# Patient Record
Sex: FEMALE | Race: WHITE | Employment: OTHER | URBAN - METROPOLITAN AREA
[De-identification: names, ages, dates, MRNs, and addresses within clinical notes are randomized per-mention and may not be internally consistent; named-entity substitution may affect disease eponyms.]

---

## 2017-01-01 ENCOUNTER — PATIENT OUTREACH (OUTPATIENT)
Dept: FAMILY MEDICINE CLINIC | Age: 82
End: 2017-01-01

## 2017-01-01 ENCOUNTER — HOME CARE VISIT (OUTPATIENT)
Dept: SCHEDULING | Facility: HOME HEALTH | Age: 82
End: 2017-01-01
Payer: MEDICARE

## 2017-01-01 ENCOUNTER — HOME CARE VISIT (OUTPATIENT)
Dept: HOME HEALTH SERVICES | Facility: HOME HEALTH | Age: 82
End: 2017-01-01

## 2017-01-01 ENCOUNTER — OFFICE VISIT (OUTPATIENT)
Dept: FAMILY MEDICINE CLINIC | Age: 82
End: 2017-01-01

## 2017-01-01 ENCOUNTER — HOME CARE VISIT (OUTPATIENT)
Dept: HOME HEALTH SERVICES | Facility: HOME HEALTH | Age: 82
End: 2017-01-01
Payer: MEDICARE

## 2017-01-01 ENCOUNTER — HOME CARE VISIT (OUTPATIENT)
Dept: SCHEDULING | Facility: HOME HEALTH | Age: 82
End: 2017-01-01

## 2017-01-01 ENCOUNTER — HOSPITAL ENCOUNTER (OUTPATIENT)
Dept: LAB | Age: 82
Discharge: HOME | End: 2017-11-01
Payer: MEDICARE

## 2017-01-01 ENCOUNTER — HOSPITAL ENCOUNTER (OUTPATIENT)
Dept: LAB | Age: 82
Discharge: HOME | End: 2017-05-31
Payer: MEDICARE

## 2017-01-01 ENCOUNTER — HOME HEALTH ADMISSION (OUTPATIENT)
Dept: HOME HEALTH SERVICES | Facility: HOME HEALTH | Age: 82
End: 2017-01-01
Payer: MEDICARE

## 2017-01-01 ENCOUNTER — HOSPITAL ENCOUNTER (OUTPATIENT)
Dept: LAB | Age: 82
Discharge: HOME | End: 2017-07-31
Payer: MEDICARE

## 2017-01-01 VITALS
OXYGEN SATURATION: 98 % | HEART RATE: 60 BPM | RESPIRATION RATE: 14 BRPM | TEMPERATURE: 98.1 F | DIASTOLIC BLOOD PRESSURE: 53 MMHG | SYSTOLIC BLOOD PRESSURE: 113 MMHG

## 2017-01-01 VITALS
TEMPERATURE: 98.5 F | OXYGEN SATURATION: 98 % | HEART RATE: 62 BPM | SYSTOLIC BLOOD PRESSURE: 131 MMHG | DIASTOLIC BLOOD PRESSURE: 53 MMHG | RESPIRATION RATE: 15 BRPM

## 2017-01-01 VITALS
BODY MASS INDEX: 18.16 KG/M2 | RESPIRATION RATE: 20 BRPM | HEART RATE: 54 BPM | OXYGEN SATURATION: 96 % | HEIGHT: 65 IN | TEMPERATURE: 95.6 F | WEIGHT: 109 LBS | SYSTOLIC BLOOD PRESSURE: 135 MMHG | DIASTOLIC BLOOD PRESSURE: 47 MMHG

## 2017-01-01 VITALS
SYSTOLIC BLOOD PRESSURE: 120 MMHG | HEART RATE: 67 BPM | RESPIRATION RATE: 14 BRPM | OXYGEN SATURATION: 97 % | DIASTOLIC BLOOD PRESSURE: 67 MMHG | TEMPERATURE: 98.3 F

## 2017-01-01 VITALS
HEIGHT: 65 IN | HEART RATE: 102 BPM | SYSTOLIC BLOOD PRESSURE: 126 MMHG | DIASTOLIC BLOOD PRESSURE: 80 MMHG | BODY MASS INDEX: 18.66 KG/M2 | TEMPERATURE: 96.7 F | OXYGEN SATURATION: 96 % | RESPIRATION RATE: 20 BRPM | WEIGHT: 112 LBS

## 2017-01-01 VITALS
HEIGHT: 65 IN | SYSTOLIC BLOOD PRESSURE: 150 MMHG | WEIGHT: 105 LBS | BODY MASS INDEX: 17.49 KG/M2 | HEART RATE: 53 BPM | OXYGEN SATURATION: 98 % | TEMPERATURE: 97.4 F | RESPIRATION RATE: 20 BRPM | DIASTOLIC BLOOD PRESSURE: 57 MMHG

## 2017-01-01 VITALS
SYSTOLIC BLOOD PRESSURE: 125 MMHG | DIASTOLIC BLOOD PRESSURE: 60 MMHG | HEART RATE: 57 BPM | OXYGEN SATURATION: 97 % | RESPIRATION RATE: 14 BRPM | TEMPERATURE: 98.3 F

## 2017-01-01 VITALS
TEMPERATURE: 98.1 F | RESPIRATION RATE: 15 BRPM | OXYGEN SATURATION: 98 % | SYSTOLIC BLOOD PRESSURE: 111 MMHG | DIASTOLIC BLOOD PRESSURE: 50 MMHG | HEART RATE: 57 BPM

## 2017-01-01 VITALS
HEART RATE: 69 BPM | SYSTOLIC BLOOD PRESSURE: 129 MMHG | BODY MASS INDEX: 18.69 KG/M2 | RESPIRATION RATE: 16 BRPM | DIASTOLIC BLOOD PRESSURE: 46 MMHG | OXYGEN SATURATION: 97 % | HEIGHT: 65 IN | TEMPERATURE: 98.1 F | WEIGHT: 112.2 LBS

## 2017-01-01 VITALS
DIASTOLIC BLOOD PRESSURE: 50 MMHG | HEART RATE: 56 BPM | RESPIRATION RATE: 15 BRPM | SYSTOLIC BLOOD PRESSURE: 117 MMHG | OXYGEN SATURATION: 96 % | TEMPERATURE: 99.2 F

## 2017-01-01 VITALS
TEMPERATURE: 98.2 F | OXYGEN SATURATION: 95 % | SYSTOLIC BLOOD PRESSURE: 151 MMHG | BODY MASS INDEX: 18.16 KG/M2 | RESPIRATION RATE: 20 BRPM | DIASTOLIC BLOOD PRESSURE: 53 MMHG | WEIGHT: 109 LBS | HEIGHT: 65 IN | HEART RATE: 62 BPM

## 2017-01-01 VITALS
TEMPERATURE: 98.2 F | SYSTOLIC BLOOD PRESSURE: 132 MMHG | HEART RATE: 62 BPM | DIASTOLIC BLOOD PRESSURE: 56 MMHG | OXYGEN SATURATION: 97 % | RESPIRATION RATE: 15 BRPM

## 2017-01-01 DIAGNOSIS — F41.9 ANXIETY: ICD-10-CM

## 2017-01-01 DIAGNOSIS — G30.9 ALZHEIMER'S DEMENTIA WITH BEHAVIORAL DISTURBANCE, UNSPECIFIED TIMING OF DEMENTIA ONSET: ICD-10-CM

## 2017-01-01 DIAGNOSIS — Z00.00 ROUTINE GENERAL MEDICAL EXAMINATION AT A HEALTH CARE FACILITY: ICD-10-CM

## 2017-01-01 DIAGNOSIS — F32.A DEPRESSION, UNSPECIFIED DEPRESSION TYPE: ICD-10-CM

## 2017-01-01 DIAGNOSIS — R35.0 URINARY FREQUENCY: Primary | ICD-10-CM

## 2017-01-01 DIAGNOSIS — Z13.39 SCREENING FOR ALCOHOLISM: ICD-10-CM

## 2017-01-01 DIAGNOSIS — G30.9 ALZHEIMER'S DEMENTIA WITH BEHAVIORAL DISTURBANCE, UNSPECIFIED TIMING OF DEMENTIA ONSET: Primary | ICD-10-CM

## 2017-01-01 DIAGNOSIS — G30.9 ALZHEIMER'S DEMENTIA WITHOUT BEHAVIORAL DISTURBANCE, UNSPECIFIED TIMING OF DEMENTIA ONSET: ICD-10-CM

## 2017-01-01 DIAGNOSIS — F02.81 ALZHEIMER'S DEMENTIA WITH BEHAVIORAL DISTURBANCE, UNSPECIFIED TIMING OF DEMENTIA ONSET: ICD-10-CM

## 2017-01-01 DIAGNOSIS — M50.30 DDD (DEGENERATIVE DISC DISEASE), CERVICAL: Primary | ICD-10-CM

## 2017-01-01 DIAGNOSIS — R20.8 BURNING SENSATION IN LOWER EXTREMITY: Primary | ICD-10-CM

## 2017-01-01 DIAGNOSIS — Z13.6 SCREENING FOR ISCHEMIC HEART DISEASE: ICD-10-CM

## 2017-01-01 DIAGNOSIS — I10 ESSENTIAL HYPERTENSION WITH GOAL BLOOD PRESSURE LESS THAN 140/90: Primary | ICD-10-CM

## 2017-01-01 DIAGNOSIS — I10 ESSENTIAL HYPERTENSION WITH GOAL BLOOD PRESSURE LESS THAN 140/90: ICD-10-CM

## 2017-01-01 DIAGNOSIS — R82.81 PYURIA: ICD-10-CM

## 2017-01-01 DIAGNOSIS — Z23 ENCOUNTER FOR IMMUNIZATION: ICD-10-CM

## 2017-01-01 DIAGNOSIS — R20.8 BURNING SENSATION IN LOWER EXTREMITY: ICD-10-CM

## 2017-01-01 DIAGNOSIS — F02.80 ALZHEIMER'S DEMENTIA WITHOUT BEHAVIORAL DISTURBANCE, UNSPECIFIED TIMING OF DEMENTIA ONSET: ICD-10-CM

## 2017-01-01 DIAGNOSIS — F02.81 ALZHEIMER'S DEMENTIA WITH BEHAVIORAL DISTURBANCE, UNSPECIFIED TIMING OF DEMENTIA ONSET: Primary | ICD-10-CM

## 2017-01-01 LAB
ALBUMIN SERPL-MCNC: 4 G/DL (ref 3.5–4.7)
ALBUMIN SERPL-MCNC: 4.1 G/DL (ref 3.5–4.7)
ALBUMIN/GLOB SERPL: 1.3 {RATIO} (ref 1.2–2.2)
ALBUMIN/GLOB SERPL: 1.6 {RATIO} (ref 1.2–2.2)
ALP SERPL-CCNC: 102 IU/L (ref 39–117)
ALP SERPL-CCNC: 90 IU/L (ref 39–117)
ALT SERPL-CCNC: 14 IU/L (ref 0–32)
ALT SERPL-CCNC: 16 IU/L (ref 0–32)
AST SERPL-CCNC: 20 IU/L (ref 0–40)
AST SERPL-CCNC: 22 IU/L (ref 0–40)
BACTERIA UR CULT: ABNORMAL
BASOPHILS # BLD AUTO: 0.1 X10E3/UL (ref 0–0.2)
BASOPHILS NFR BLD AUTO: 4 %
BILIRUB SERPL-MCNC: 0.2 MG/DL (ref 0–1.2)
BILIRUB SERPL-MCNC: 0.3 MG/DL (ref 0–1.2)
BILIRUB UR QL STRIP: NEGATIVE
BUN SERPL-MCNC: 15 MG/DL (ref 8–27)
BUN SERPL-MCNC: 19 MG/DL (ref 8–27)
BUN/CREAT SERPL: 16 (ref 12–28)
BUN/CREAT SERPL: 18 (ref 12–28)
CALCIUM SERPL-MCNC: 9 MG/DL (ref 8.7–10.3)
CALCIUM SERPL-MCNC: 9.2 MG/DL (ref 8.7–10.3)
CHLORIDE SERPL-SCNC: 100 MMOL/L (ref 96–106)
CHLORIDE SERPL-SCNC: 98 MMOL/L (ref 96–106)
CHOLEST SERPL-MCNC: 199 MG/DL (ref 100–199)
CO2 SERPL-SCNC: 28 MMOL/L (ref 18–29)
CO2 SERPL-SCNC: 30 MMOL/L (ref 18–29)
CREAT SERPL-MCNC: 0.94 MG/DL (ref 0.57–1)
CREAT SERPL-MCNC: 1.04 MG/DL (ref 0.57–1)
EOSINOPHIL # BLD AUTO: 0.1 X10E3/UL (ref 0–0.4)
EOSINOPHIL NFR BLD AUTO: 3 %
ERYTHROCYTE [DISTWIDTH] IN BLOOD BY AUTOMATED COUNT: 14.6 % (ref 12.3–15.4)
FOLATE SERPL-MCNC: 8.9 NG/ML
GFR SERPLBLD CREATININE-BSD FMLA CKD-EPI: 56 ML/MIN/1.73
GFR SERPLBLD CREATININE-BSD FMLA CKD-EPI: 65 ML/MIN/1.73
GLOBULIN SER CALC-MCNC: 2.6 G/DL (ref 1.5–4.5)
GLOBULIN SER CALC-MCNC: 3 G/DL (ref 1.5–4.5)
GLUCOSE SERPL-MCNC: 75 MG/DL (ref 65–99)
GLUCOSE SERPL-MCNC: 92 MG/DL (ref 65–99)
GLUCOSE UR-MCNC: NEGATIVE MG/DL
HBA1C MFR BLD HPLC: 5.3 %
HCT VFR BLD AUTO: 24.9 % (ref 34–46.6)
HDLC SERPL-MCNC: 42 MG/DL
HGB BLD-MCNC: 8.9 G/DL (ref 11.1–15.9)
IMM GRANULOCYTES # BLD: 0 X10E3/UL (ref 0–0.1)
IMM GRANULOCYTES NFR BLD: 0 %
INTERPRETATION, 910389: NORMAL
INTERPRETATION: NORMAL
INTERPRETATION: NORMAL
IRON SATN MFR SERPL: 30 % (ref 15–55)
IRON SERPL-MCNC: 75 UG/DL (ref 27–139)
KETONES P FAST UR STRIP-MCNC: NEGATIVE MG/DL
LDLC SERPL CALC-MCNC: 96 MG/DL (ref 0–99)
LYMPHOCYTES # BLD AUTO: 1.5 X10E3/UL (ref 0.7–3.1)
LYMPHOCYTES NFR BLD AUTO: 71 %
MCH RBC QN AUTO: 36 PG (ref 26.6–33)
MCHC RBC AUTO-ENTMCNC: 35.7 G/DL (ref 31.5–35.7)
MCV RBC AUTO: 101 FL (ref 79–97)
MONOCYTES # BLD AUTO: 0.2 X10E3/UL (ref 0.1–0.9)
MONOCYTES NFR BLD AUTO: 7 %
MORPHOLOGY BLD-IMP: ABNORMAL
NEUTROPHILS # BLD AUTO: 0.3 X10E3/UL (ref 1.4–7)
NEUTROPHILS NFR BLD AUTO: 15 %
PDF IMAGE, 910387: NORMAL
PH UR STRIP: 7 [PH] (ref 4.6–8)
PLATELET # BLD AUTO: 401 X10E3/UL (ref 150–379)
POTASSIUM SERPL-SCNC: 3.8 MMOL/L (ref 3.5–5.2)
POTASSIUM SERPL-SCNC: 4.3 MMOL/L (ref 3.5–5.2)
PROT SERPL-MCNC: 6.7 G/DL (ref 6–8.5)
PROT SERPL-MCNC: 7 G/DL (ref 6–8.5)
PROT UR QL STRIP: NORMAL MG/DL
RBC # BLD AUTO: 2.47 X10E6/UL (ref 3.77–5.28)
SODIUM SERPL-SCNC: 140 MMOL/L (ref 134–144)
SODIUM SERPL-SCNC: 142 MMOL/L (ref 134–144)
SP GR UR STRIP: 1.01 (ref 1–1.03)
TIBC SERPL-MCNC: 254 UG/DL (ref 250–450)
TRIGL SERPL-MCNC: 303 MG/DL (ref 0–149)
TSH SERPL DL<=0.005 MIU/L-ACNC: 1.91 UIU/ML (ref 0.45–4.5)
UA UROBILINOGEN AMB POC: NORMAL (ref 0.2–1)
UIBC SERPL-MCNC: 179 UG/DL (ref 118–369)
URINALYSIS CLARITY POC: NORMAL
URINALYSIS COLOR POC: YELLOW
URINE BLOOD POC: NEGATIVE
URINE LEUKOCYTES POC: NORMAL
URINE NITRITES POC: NEGATIVE
VIT B12 SERPL-MCNC: 309 PG/ML (ref 211–946)
VLDLC SERPL CALC-MCNC: 61 MG/DL (ref 5–40)
WBC # BLD AUTO: 2.2 X10E3/UL (ref 3.4–10.8)

## 2017-01-01 PROCEDURE — 3331090001 HH PPS REVENUE CREDIT

## 2017-01-01 PROCEDURE — G0155 HHCP-SVS OF CSW,EA 15 MIN: HCPCS

## 2017-01-01 PROCEDURE — 80053 COMPREHEN METABOLIC PANEL: CPT

## 2017-01-01 PROCEDURE — 84443 ASSAY THYROID STIM HORMONE: CPT

## 2017-01-01 PROCEDURE — G0151 HHCP-SERV OF PT,EA 15 MIN: HCPCS

## 2017-01-01 PROCEDURE — 3331090002 HH PPS REVENUE DEBIT

## 2017-01-01 PROCEDURE — G0299 HHS/HOSPICE OF RN EA 15 MIN: HCPCS

## 2017-01-01 PROCEDURE — 80061 LIPID PANEL: CPT

## 2017-01-01 PROCEDURE — 3331090003 HH PPS REVENUE ADJ

## 2017-01-01 PROCEDURE — 83550 IRON BINDING TEST: CPT

## 2017-01-01 PROCEDURE — 85025 COMPLETE CBC W/AUTO DIFF WBC: CPT

## 2017-01-01 PROCEDURE — 400013 HH SOC

## 2017-01-01 PROCEDURE — 87186 SC STD MICRODIL/AGAR DIL: CPT

## 2017-01-01 PROCEDURE — 87088 URINE BACTERIA CULTURE: CPT

## 2017-01-01 PROCEDURE — 36415 COLL VENOUS BLD VENIPUNCTURE: CPT

## 2017-01-01 PROCEDURE — 87086 URINE CULTURE/COLONY COUNT: CPT

## 2017-01-01 PROCEDURE — 82607 VITAMIN B-12: CPT

## 2017-01-01 RX ORDER — METOPROLOL SUCCINATE 25 MG/1
TABLET, EXTENDED RELEASE ORAL
Qty: 30 TAB | Refills: 2 | Status: SHIPPED | OUTPATIENT
Start: 2017-01-01 | End: 2017-01-01 | Stop reason: SDUPTHER

## 2017-01-01 RX ORDER — SERTRALINE HYDROCHLORIDE 50 MG/1
TABLET, FILM COATED ORAL
Qty: 60 TAB | Refills: 11 | Status: SHIPPED | OUTPATIENT
Start: 2017-01-01 | End: 2018-01-01 | Stop reason: DRUGHIGH

## 2017-01-01 RX ORDER — ALPRAZOLAM 0.25 MG/1
TABLET ORAL
Qty: 60 TAB | Refills: 3 | Status: SHIPPED | OUTPATIENT
Start: 2017-01-01 | End: 2018-01-01 | Stop reason: DRUGHIGH

## 2017-01-01 RX ORDER — ALPRAZOLAM 0.25 MG/1
TABLET ORAL
Qty: 60 TAB | Refills: 3 | Status: SHIPPED | OUTPATIENT
Start: 2017-01-01 | End: 2017-01-01 | Stop reason: SDUPTHER

## 2017-01-01 RX ORDER — ALPRAZOLAM 0.25 MG/1
TABLET ORAL
Qty: 60 TAB | Refills: 2 | Status: SHIPPED | OUTPATIENT
Start: 2017-01-01 | End: 2017-01-01 | Stop reason: SDUPTHER

## 2017-01-01 RX ORDER — GABAPENTIN 100 MG/1
100 CAPSULE ORAL
Qty: 60 CAP | Refills: 1 | Status: SHIPPED | OUTPATIENT
Start: 2017-01-01 | End: 2017-01-01 | Stop reason: SDUPTHER

## 2017-01-01 RX ORDER — SULFAMETHOXAZOLE AND TRIMETHOPRIM 800; 160 MG/1; MG/1
1 TABLET ORAL 2 TIMES DAILY
Qty: 6 TAB | Refills: 0 | Status: SHIPPED | OUTPATIENT
Start: 2017-01-01 | End: 2017-01-01

## 2017-01-01 RX ORDER — GABAPENTIN 300 MG/1
300 CAPSULE ORAL 2 TIMES DAILY
Qty: 60 CAP | Refills: 3 | Status: SHIPPED | OUTPATIENT
Start: 2017-01-01 | End: 2018-01-01

## 2017-01-01 RX ORDER — LOSARTAN POTASSIUM AND HYDROCHLOROTHIAZIDE 25; 100 MG/1; MG/1
TABLET ORAL
Qty: 30 TAB | Refills: 11 | Status: SHIPPED | OUTPATIENT
Start: 2017-01-01 | End: 2018-01-01

## 2017-01-01 RX ORDER — METOPROLOL SUCCINATE 25 MG/1
25 TABLET, EXTENDED RELEASE ORAL DAILY
Qty: 30 TAB | Refills: 11 | Status: ON HOLD | OUTPATIENT
Start: 2017-01-01 | End: 2018-01-01

## 2017-01-01 RX ORDER — DONEPEZIL HYDROCHLORIDE 5 MG/1
5 TABLET, FILM COATED ORAL
Qty: 30 TAB | Refills: 1 | Status: SHIPPED | OUTPATIENT
Start: 2017-01-01 | End: 2017-01-01 | Stop reason: ALTCHOICE

## 2017-01-01 RX ORDER — ALPRAZOLAM 0.25 MG/1
TABLET ORAL
Qty: 30 TAB | Refills: 2 | Status: SHIPPED | OUTPATIENT
Start: 2017-01-01 | End: 2017-01-01 | Stop reason: SDUPTHER

## 2017-01-11 ENCOUNTER — OFFICE VISIT (OUTPATIENT)
Dept: FAMILY MEDICINE CLINIC | Age: 82
End: 2017-01-11

## 2017-01-11 VITALS
BODY MASS INDEX: 17.79 KG/M2 | WEIGHT: 106.8 LBS | HEART RATE: 92 BPM | SYSTOLIC BLOOD PRESSURE: 131 MMHG | TEMPERATURE: 97.3 F | OXYGEN SATURATION: 95 % | RESPIRATION RATE: 20 BRPM | DIASTOLIC BLOOD PRESSURE: 40 MMHG | HEIGHT: 65 IN

## 2017-01-11 DIAGNOSIS — I10 ESSENTIAL HYPERTENSION WITH GOAL BLOOD PRESSURE LESS THAN 140/90: ICD-10-CM

## 2017-01-11 DIAGNOSIS — F32.A DEPRESSION, UNSPECIFIED DEPRESSION TYPE: ICD-10-CM

## 2017-01-11 RX ORDER — SERTRALINE HYDROCHLORIDE 50 MG/1
TABLET, FILM COATED ORAL
Qty: 60 TAB | Refills: 3 | Status: SHIPPED | OUTPATIENT
Start: 2017-01-11 | End: 2017-01-01 | Stop reason: SDUPTHER

## 2017-01-11 RX ORDER — LOSARTAN POTASSIUM AND HYDROCHLOROTHIAZIDE 25; 100 MG/1; MG/1
TABLET ORAL
Qty: 30 TAB | Refills: 3 | Status: SHIPPED | OUTPATIENT
Start: 2017-01-11 | End: 2017-01-01 | Stop reason: SDUPTHER

## 2017-01-11 RX ORDER — METOPROLOL SUCCINATE 25 MG/1
25 TABLET, EXTENDED RELEASE ORAL
Qty: 30 TAB | Refills: 3 | Status: SHIPPED | OUTPATIENT
Start: 2017-01-11 | End: 2017-01-01 | Stop reason: SDUPTHER

## 2017-01-11 RX ORDER — ALPRAZOLAM 0.25 MG/1
TABLET ORAL
Qty: 30 TAB | Refills: 3 | Status: SHIPPED | OUTPATIENT
Start: 2017-01-11 | End: 2017-01-01 | Stop reason: SDUPTHER

## 2017-01-11 NOTE — MR AVS SNAPSHOT
Visit Information Date & Time Provider Department Dept. Phone Encounter #  
 1/11/2017  3:00 PM Ana Laura Colbert MD [facility] [phone] [id number] Follow-up Instructions Return in about 6 months (around 7/11/2017). Upcoming Health Maintenance Date Due DTaP/Tdap/Td series (1 - Tdap) 8/19/1955 GLAUCOMA SCREENING Q2Y 2/10/2016 MEDICARE YEARLY EXAM 4/30/2016 INFLUENZA AGE 9 TO ADULT 8/1/2016 Pneumococcal 65+ High/Highest Risk (2 of 2 - PPSV23) 9/15/2016 Allergies as of 1/11/2017  Review Complete On: 1/11/2017 By: Tra Guevara LPN No Known Allergies Current Immunizations  Reviewed on 10/18/2015 Name Date Influenza High Dose Vaccine PF 1/28/2015 Influenza Vaccine 10/3/2013 Influenza Vaccine (Quad) PF 10/18/2015  8:45 AM  
 Influenza Vaccine Split 8/31/2012, 9/15/2011 Pneumococcal Vaccine (Unspecified Type) 9/15/2011 Not reviewed this visit You Were Diagnosed With   
  
 Codes Comments Essential hypertension with goal blood pressure less than 140/90     ICD-10-CM: I10 
ICD-9-CM: 401.9 Depression, unspecified depression type     ICD-10-CM: F32.9 ICD-9-CM: 080 Vitals BP Pulse Temp Resp Height(growth percentile) Weight(growth percentile) 131/40 (BP 1 Location: Left arm, BP Patient Position: Sitting) 92 97.3 °F (36.3 °C) (Oral) 20 5' 5\" (1.651 m) 106 lb 12.8 oz (48.4 kg) LMP SpO2 BMI OB Status Smoking Status (LMP Unknown) 95% 17.77 kg/m2 Hysterectomy Former Smoker BMI and BSA Data Body Mass Index Body Surface Area  
 17.77 kg/m 2 1.49 m 2 Preferred Pharmacy Pharmacy Name Phone [email or web address] [phone] Your Updated Medication List  
  
   
This list is accurate as of: 1/11/17  3:31 PM.  Always use your most recent med list.  
  
  
  
  
 ALPRAZolam 0.25 mg tablet Commonly known as:  Emil Veliz TAKE ONE TABLET BY MOUTH TWICE DAILY AS NEEDED FOR ANXIETY. MAX 2 TABLETS DAILY. aspirin 81 mg chewable tablet Take 81 mg by mouth daily. losartan-hydroCHLOROthiazide 100-25 mg per tablet Commonly known as:  HYZAAR  
TAKE ONE TABLET BY MOUTH ONE TIME DAILY  
  
 metoprolol succinate 25 mg XL tablet Commonly known as:  TOPROL-XL Take 1 Tab by mouth nightly. sertraline 50 mg tablet Commonly known as:  ZOLOFT  
1 po bid TYLENOL PM PO Take  by mouth. Prescriptions Printed Refills  
 losartan-hydroCHLOROthiazide (HYZAAR) 100-25 mg per tablet 3 Sig: TAKE ONE TABLET BY MOUTH ONE TIME DAILY Class: Print  
 sertraline (ZOLOFT) 50 mg tablet 3 Si po bid Class: Print ALPRAZolam (XANAX) 0.25 mg tablet 3 Sig: TAKE ONE TABLET BY MOUTH TWICE DAILY AS NEEDED FOR ANXIETY. MAX 2 TABLETS DAILY. Class: Print  
 metoprolol succinate (TOPROL-XL) 25 mg XL tablet 3 Sig: Take 1 Tab by mouth nightly. Class: Print Route: Oral  
  
Follow-up Instructions Return in about 6 months (around 2017). Introducing \[facility] [place] [facility]\"" & [facility] SERVICES! Chuy Jimenez introduces [employer] patient portal. Now you can access parts of your medical record, email your doctor's office, and request medication refills online. 1. In your internet browser, go to [email or web address] 2. Click on the First Time User? Click Here link in the Sign In box. You will see the New Member Sign Up page. 3. Enter your Milk Access Code exactly as it appears below. You will not need to use this code after youve completed the sign-up process. If you do not sign up before the expiration date, you must request a new code. · Milk Access Code: [email or web address] Expires: 2017  3:14 PM 
 
4. Enter the last four digits of your Social Security Number (xxxx) and Date of Birth (mm/dd/yyyy) as indicated and click Submit. You will be taken to the next sign-up page. 5. Create a Socket Mobile ID. This will be your Socket Mobile login ID and cannot be changed, so think of one that is secure and easy to remember. 6. Create a Socket Mobile password. You can change your password at any time. 7. Enter your Password Reset Question and Answer. This can be used at a later time if you forget your password. 8. Enter your e-mail address. You will receive e-mail notification when new information is available in [address] 9. Click Sign Up. You can now view and download portions of your medical record. 10. Click the Download Summary menu link to download a portable copy of your medical information. If you have questions, please visit the Frequently Asked Questions section of the [employer] website. Remember, [employer] is NOT to be used for urgent needs. For medical emergencies, dial 911. Now available from your iPhone and Android! Please provide this summary of care documentation to your next provider. Your primary care clinician is listed as Magdalene Leach. If you have any questions after today's visit, please call [phone].

## 2017-01-11 NOTE — PROGRESS NOTES
HISTORY OF PRESENT ILLNESS  Jose Marrufo is a 80 y.o. female here today for follow up of HTN and depression. She has been feeling well on her medication. She had an episode of nausea, vomiting and diarrhea last month and went to the ED for eval because of her 2 previous bowel obstructions but her sxs resolved in the waiting room and she left the ED and has been feeling fine since then. Dtr accompanies her today and doesn't report any problems other than her ongoing dementia. She had labs at the ED, she has continued anemia and was a little dehydrated at that time but otherwise her labs looked good. Hypertension    The history is provided by the patient. This is a chronic problem. The current episode started more than 1 week ago. The problem has not changed since onset. Pertinent negatives include no chest pain, no dizziness and no shortness of breath. Review of Systems   Respiratory: Negative for shortness of breath. Cardiovascular: Negative for chest pain. Neurological: Negative for dizziness. Physical Exam   Constitutional: She appears well-developed and well-nourished. /40 (BP 1 Location: Left arm, BP Patient Position: Sitting)  Pulse 92  Temp 97.3 °F (36.3 °C) (Oral)   Resp 20  Ht 5' 5\" (1.651 m)  Wt 106 lb 12.8 oz (48.4 kg)  LMP  (LMP Unknown)  SpO2 95%  BMI 17.77 kg/m2  Walking with cane   HENT:   Head: Normocephalic and atraumatic. Eyes: No scleral icterus. Neck: No thyromegaly present. Cardiovascular: Normal heart sounds. Pulmonary/Chest: Breath sounds normal.   Abdominal: Soft. There is no tenderness. Lymphadenopathy:     She has no cervical adenopathy. Neurological: She is alert. Psychiatric: She has a normal mood and affect. Nursing note and vitals reviewed.     Patient Active Problem List   Diagnosis Code    Hypercholesterolemia E78.00    MVP (mitral valve prolapse) I34.1    DDD (degenerative disc disease), cervical M50.30    Esophagitis 530.1    Encounter for long-term (current) use of other medications Z79.899    MR (mitral regurgitation) I34.0    Essential hypertension I10    SBO (small bowel obstruction) (HCC) K56.69    Depression F32.9    Intussusception of small intestine (HCC) K56.1    Intussusception, ileocecal (HCC) K56.1    Malignant neoplasm of ascending colon (HCC) C18.2     Past Medical History   Diagnosis Date    Anemia     Anxiety     DDD (degenerative disc disease), cervical     Depression     Esophagitis     FH: breast cancer     FH: hypertension     Hypercholesterolemia     Hypertension     MR (mitral regurgitation)     MVP (mitral valve prolapse)     Stool color black      Social History     Social History    Marital status:      Spouse name: N/A    Number of children: N/A    Years of education: N/A     Social History Main Topics    Smoking status: Former Smoker     Quit date: 8/5/1960    Smokeless tobacco: Never Used    Alcohol use No    Drug use: No    Sexual activity: Not Currently     Other Topics Concern    None     Social History Narrative     Family History   Problem Relation Age of Onset    Cancer Mother      breast    Hypertension Mother     Diabetes Mother     Cancer Father      head and neck    Hypertension Father     Cancer Brother     Cancer Maternal Aunt      Current Outpatient Prescriptions   Medication Sig    losartan-hydroCHLOROthiazide (HYZAAR) 100-25 mg per tablet TAKE ONE TABLET BY MOUTH ONE TIME DAILY    sertraline (ZOLOFT) 50 mg tablet 1 po bid    ALPRAZolam (XANAX) 0.25 mg tablet TAKE ONE TABLET BY MOUTH TWICE DAILY AS NEEDED FOR ANXIETY. MAX 2 TABLETS DAILY.  metoprolol succinate (TOPROL-XL) 25 mg XL tablet Take 1 Tab by mouth nightly.  ACETAMINOPHEN/DIPHENHYDRAMINE (TYLENOL PM PO) Take  by mouth.  aspirin 81 mg chewable tablet Take 81 mg by mouth daily.      No Known Allergies    ASSESSMENT and PLAN  Mrs. Juan Monterroso is talkative and seems happy today, BP is stable, cont current care. Care plan reviewed and pt understands. After visit summary printed and reviewed with patient. Osman Stevens was seen today for hypertension. Diagnoses and all orders for this visit:    Essential hypertension with goal blood pressure less than 140/90  -     losartan-hydroCHLOROthiazide (HYZAAR) 100-25 mg per tablet; TAKE ONE TABLET BY MOUTH ONE TIME DAILY  -     metoprolol succinate (TOPROL-XL) 25 mg XL tablet; Take 1 Tab by mouth nightly. Depression, unspecified depression type  -     sertraline (ZOLOFT) 50 mg tablet; 1 po bid  -     ALPRAZolam (XANAX) 0.25 mg tablet; TAKE ONE TABLET BY MOUTH TWICE DAILY AS NEEDED FOR ANXIETY. MAX 2 TABLETS DAILY. Follow-up Disposition:  Return in about 6 months (around 7/11/2017).

## 2017-03-01 ENCOUNTER — TELEPHONE (OUTPATIENT)
Dept: FAMILY MEDICINE CLINIC | Age: 82
End: 2017-03-01

## 2017-03-01 NOTE — TELEPHONE ENCOUNTER
----- Message from Kandi Cunningham sent at 3/1/2017  9:07 AM EST -----  Regarding: Dr. Deanne Bernard  Pt has a complaint of a burning sensation all over her body. Pt's daughter informed that the complaint was once periodic, but is now constant.  Best contact number is, [phone]

## 2017-03-01 NOTE — TELEPHONE ENCOUNTER
MD Tatyana Jaeger, MAKAYLA                            Yes, if that is helping it is low dose and I think that is fine for her to take. Daughter notified of above.

## 2017-03-01 NOTE — TELEPHONE ENCOUNTER
Spoke with daughter. Patient c/o feeling like she's burning everyday now xanax helps. She wants to know if she is doing the right thing by giving the xanax.

## 2017-05-31 NOTE — PROGRESS NOTES
HISTORY OF PRESENT ILLNESS  Christine Muniz is a 80 y.o. female here today with an increase in her confusion over the last week or so, dtr is a nurse and is hoping it is UTI causing this change. Pt has dementia and is not c/o urinary sxs, not having any abdominal discomfort and no fever. Altered mental status    The history is provided by the patient and caregiver. Bladder Infection          ROS    Physical Exam   Constitutional:   /46 (BP 1 Location: Left arm, BP Patient Position: Sitting)  Pulse 69  Temp 98.1 °F (36.7 °C) (Oral)   Resp 16  Ht 5' 5\" (1.651 m)  Wt 112 lb 3.2 oz (50.9 kg)  LMP  (LMP Unknown)  SpO2 97%  BMI 18.67 kg/m2     Cardiovascular: Normal heart sounds. Pulmonary/Chest: Breath sounds normal.   Abdominal: Soft. There is no tenderness. Neurological: She is alert. Psychiatric: She has a normal mood and affect. Nursing note and vitals reviewed.     Patient Active Problem List   Diagnosis Code    Hypercholesterolemia E78.00    MVP (mitral valve prolapse) I34.1    DDD (degenerative disc disease), cervical M50.30    Esophagitis 530.1    Encounter for long-term (current) use of other medications Z79.899    MR (mitral regurgitation) I34.0    Essential hypertension I10    SBO (small bowel obstruction) ([facility]) K56.69    Depression F32.9    Intussusception of small intestine ([facility]) K56.1    Intussusception, ileocecal ([address] K56.1    Malignant neoplasm of ascending colon ([address] C18.2     Past Medical History:   Diagnosis Date    Anemia     Anxiety     DDD (degenerative disc disease), cervical     Depression     Esophagitis     FH: breast cancer     FH: hypertension     Hypercholesterolemia     Hypertension     MR (mitral regurgitation)     MVP (mitral valve prolapse)     Stool color black      Social History     Social History    Marital status:      Spouse name: N/A    Number of children: N/A    Years of education: N/A     Social History Main Topics    Smoking status: Former Smoker     Quit date: 8/5/1960    Smokeless tobacco: Never Used    Alcohol use No    Drug use: No    Sexual activity: Not Currently     Other Topics Concern    None     Social History Narrative     Family History   Problem Relation Age of Onset    Cancer Mother      breast    Hypertension Mother     Diabetes Mother     Cancer Father      head and neck    Hypertension Father     Cancer Brother     Cancer Maternal Aunt      Current Outpatient Prescriptions   Medication Sig    ALPRAZolam (XANAX) 0.25 mg tablet TAKE ONE TABLET BY MOUTH TWICE DAILY AS NEEDED FOR ANXIETY. MAX OF 2 TABLETS DAILY    trimethoprim-sulfamethoxazole (BACTRIM DS, SEPTRA DS) 160-800 mg per tablet Take 1 Tab by mouth two (2) times a day for 3 days.  losartan-hydroCHLOROthiazide (HYZAAR) 100-25 mg per tablet TAKE ONE TABLET BY MOUTH ONE TIME DAILY    sertraline (ZOLOFT) 50 mg tablet 1 po bid    metoprolol succinate (TOPROL-XL) 25 mg XL tablet Take 1 Tab by mouth nightly.  ACETAMINOPHEN/DIPHENHYDRAMINE (TYLENOL PM PO) Take  by mouth.  aspirin 81 mg chewable tablet Take 81 mg by mouth daily. No Known Allergies    ASSESSMENT and PLAN  In office UA with 3+ Leuk, treatment as documented, cx pending. Care plan reviewed and pt understands. After visit summary printed and reviewed with patient. Tami Crowder was seen today for altered mental status and bladder infection. Diagnoses and all orders for this visit:    Urinary frequency  -     AMB POC URINALYSIS DIP STICK AUTO W/O MICRO  -     CULTURE, URINE  -     trimethoprim-sulfamethoxazole (BACTRIM DS, SEPTRA DS) 160-800 mg per tablet; Take 1 Tab by mouth two (2) times a day for 3 days. Pyuria  -     CULTURE, URINE    Depression, unspecified depression type  -     ALPRAZolam (XANAX) 0.25 mg tablet; TAKE ONE TABLET BY MOUTH TWICE DAILY AS NEEDED FOR ANXIETY.  MAX OF 2 TABLETS DAILY

## 2017-05-31 NOTE — PROGRESS NOTES
Chief Complaint   Patient presents with    Altered mental status     PT with increased cinfusion over the last couple of weeks    Bladder Infection     urinary incontinence

## 2017-06-26 NOTE — PROGRESS NOTES
VO Received from Dr. Gisell Hernandez for [address] - SN, PT/OT for safety eval, MSW for community resources/assistance with placement

## 2017-07-31 NOTE — PROGRESS NOTES
HISTORY OF PRESENT ILLNESS  Leslee Hilario is a 80 y.o. female. HPI  Patient comes in today to establish care. Patients daughter present with patient today, states patient likes to eats sweets. Lives with daughter. Daughter helps bath patient, prepare meals. No recent falls at home. Safe home without fall risk factors. HX of dementia/alzheimers - 50% of time, patient does not know her. Patient confused, gets crazy thoughts in head. Patient is not abusive/combative. No Known Allergies    Past Medical History:   Diagnosis Date    Anemia     Anxiety     DDD (degenerative disc disease), cervical     Depression     Esophagitis     FH: breast cancer     FH: hypertension     Hypercholesterolemia     Hypertension     MR (mitral regurgitation)     MVP (mitral valve prolapse)     Stool color black        Past Surgical History:   Procedure Laterality Date    ABDOMEN SURGERY PROC UNLISTED  2015     and    2016    Bowel Blockage    DILATE ESOPHAGUS  6/3/2011         ENDOSCOPY, COLON, DIAGNOSTIC      due 2016    HX ABDOMINAL LAPAROSCOPY  10/2015    lap enterolysis for obstructing solitary adhesive band    HX CATARACT REMOVAL      HX GI      esoph dilatation    HX GI  08/27/2016    LAPAROTOMY EXPLORATORY, Ileocecectomy.  HX HYSTERECTOMY      HX ORTHOPAEDIC      R shoulder replacement    [place] EGD TRANSORAL BIOPSY SINGLE/MULTIPLE  6/3/2011            Social History     Social History    Marital status:      Spouse name: N/A    Number of children: N/A    Years of education: N/A     Occupational History    Not on file.      Social History Main Topics    Smoking status: Former Smoker     Quit date: 8/5/1960    Smokeless tobacco: Never Used    Alcohol use No    Drug use: No    Sexual activity: Not Currently     Other Topics Concern    Not on file     Social History Narrative       Family History   Problem Relation Age of Onset    Cancer Mother      breast    Hypertension Mother    [facility] Diabetes Mother     Cancer Father      head and neck    Hypertension Father     Cancer Brother     Cancer Maternal Aunt        Current Outpatient Prescriptions   Medication Sig    pneumococcal 13 xavier conj dip (PREVNAR 13, PF,) 0.5 mL syrg injection 0.5 mL by IntraMUSCular route once for 1 dose.  metoprolol succinate (TOPROL-XL) 25 mg XL tablet Take 1 tablet by mouth nightly.  ALPRAZolam (XANAX) 0.25 mg tablet TAKE ONE TABLET BY MOUTH TWICE DAILY AS NEEDED FOR ANXIETY. MAX OF 2 TABLETS DAILY    losartan-hydroCHLOROthiazide (HYZAAR) 100-25 mg per tablet TAKE ONE TABLET BY MOUTH ONE TIME DAILY    sertraline (ZOLOFT) 50 mg tablet 1 po bid    ACETAMINOPHEN/DIPHENHYDRAMINE (TYLENOL PM PO) Take  by mouth.  aspirin 81 mg chewable tablet Take 81 mg by mouth daily. No current facility-administered medications for this visit. Review of Systems   Constitutional: Negative for chills, diaphoresis, fever, malaise/fatigue and weight loss. HENT: Negative for congestion, ear pain, sore throat and tinnitus. Eyes: Negative for blurred vision and double vision. Respiratory: Negative for cough, sputum production, shortness of breath and wheezing. Cardiovascular: Negative for chest pain, palpitations and leg swelling. Gastrointestinal: Negative for abdominal pain, blood in stool, constipation, diarrhea, nausea and vomiting. Genitourinary: Negative for dysuria, flank pain, frequency, hematuria and urgency. Musculoskeletal: Negative for back pain, joint pain and myalgias. Skin: Negative. Neurological: Negative for dizziness, tingling, sensory change, speech change, focal weakness and headaches. Psychiatric/Behavioral: Positive for memory loss. Negative for depression. The patient is not nervous/anxious and does not have insomnia.       Vitals:    07/31/17 1046 07/31/17 1113   BP: (!) 117/37 135/47   Pulse: (!) 57 (!) 54   Resp: 20    Temp: 95.6 °F (35.3 °C)    TempSrc: Oral    SpO2: 96% Weight: 109 lb (49.4 kg)    Height: 5' 5\" (1.651 m)      Physical Exam   Constitutional: She is oriented to person, place, and time. Vital signs are normal. She appears well-developed and well-nourished. She is cooperative. HENT:   Right Ear: Hearing, tympanic membrane, external ear and ear canal normal.   Left Ear: Hearing, tympanic membrane, external ear and ear canal normal.   Nose: Nose normal. Right sinus exhibits no maxillary sinus tenderness and no frontal sinus tenderness. Left sinus exhibits no maxillary sinus tenderness and no frontal sinus tenderness. Mouth/Throat: Uvula is midline, oropharynx is clear and moist and mucous membranes are normal. Mucous membranes are not pale and not dry. No oropharyngeal exudate, posterior oropharyngeal edema or posterior oropharyngeal erythema. Neck: No thyroid mass and no thyromegaly present. Cardiovascular: Normal rate, regular rhythm, S1 normal, S2 normal and normal heart sounds. No murmur heard. Pulses:       Radial pulses are 2+ on the right side, and 2+ on the left side. Dorsalis pedis pulses are 2+ on the right side, and 2+ on the left side. Posterior tibial pulses are 2+ on the right side, and 2+ on the left side. Pulmonary/Chest: Effort normal and breath sounds normal. She has no decreased breath sounds. She has no wheezes. She has no rhonchi. She has no rales. Abdominal: Soft. Normal appearance and bowel sounds are normal. There is no hepatosplenomegaly. There is no tenderness. There is no CVA tenderness. Lymphadenopathy:        Head (right side): No submental, no submandibular, no tonsillar, no preauricular and no posterior auricular adenopathy present. Head (left side): No submental, no submandibular, no tonsillar, no preauricular and no posterior auricular adenopathy present. She has no cervical adenopathy. Right: No supraclavicular adenopathy present. Left: No supraclavicular adenopathy present. Neurological: She is alert and oriented to person, place, and time. Skin: Skin is warm, dry and intact. Psychiatric: She has a normal mood and affect. Her speech is normal and behavior is normal. Thought content normal. Cognition and memory are impaired. She expresses inappropriate judgment. She exhibits abnormal recent memory and abnormal remote memory. Vitals reviewed. ASSESSMENT and PLAN    ICD-10-CM ICD-9-CM    1. Essential hypertension with goal blood pressure less than 140/90 I10 401.9 losartan-hydroCHLOROthiazide (HYZAAR) 100-25 mg per tablet      metoprolol succinate (TOPROL-XL) 25 mg XL tablet      AMB POC HEMOGLOBIN Y7B      METABOLIC PANEL, COMPREHENSIVE   2. Alzheimer's dementia without behavioral disturbance, unspecified timing of dementia onset G30.9 331.0     F02.80 294.10    3. Depression, unspecified depression type F32.9 311 ALPRAZolam (XANAX) 0.25 mg tablet      sertraline (ZOLOFT) 50 mg tablet   4. Routine general medical examination at a health care facility Z00.00 V70.0 AMB POC HEMOGLOBIN A1C      LIPID PANEL      pneumococcal 13 xavier conj dip (PREVNAR 13, PF,) 0.5 mL syrg injection      DISCONTINUED: pneumococcal 13 xavier conj dip (PREVNAR 13, PF,) 0.5 mL syrg injection   5. Screening for alcoholism Z13.89 V79.1    6. Encounter for immunization Z23 V03.89 TETANUS, DIPHTHERIA TOXOIDS AND ACELLULAR PERTUSSIS VACCINE (TDAP), IN INDIVIDS. >=7, IM      pneumococcal 13 xavier conj dip (PREVNAR 13, PF,) 0.5 mL syrg injection      DISCONTINUED: pneumococcal 13 xavier conj dip (PREVNAR 13, PF,) 0.5 mL syrg injection   7. Screening for ischemic heart disease Z13.6 V81.0 LIPID PANEL     Encounter Diagnoses   Name Primary?     Essential hypertension with goal blood pressure less than 140/90 Yes    Alzheimer's dementia without behavioral disturbance, unspecified timing of dementia onset     Depression, unspecified depression type     Routine general medical examination at a health care facility    Paxton Joyner Screening for alcoholism     Encounter for immunization     Screening for ischemic heart disease      Orders Placed This Encounter    Tetanus, Diphtheria Toxoids and Acellular Pertussis Vaccine (TDAP)    Lipid Panel ([id number])    METABOLIC PANEL, COMPREHENSIVE    CVD REPORT    CKD REPORT    AMB POC HEMOGLOBIN A1C    ALPRAZolam (XANAX) 0.25 mg tablet    losartan-hydroCHLOROthiazide (HYZAAR) 100-25 mg per tablet    sertraline (ZOLOFT) 50 mg tablet    metoprolol succinate (TOPROL-XL) 25 mg XL tablet    DISCONTD: pneumococcal 13 xavier conj dip (PREVNAR 13, PF,) 0.5 mL syrg injection    pneumococcal 13 xavier conj dip (PREVNAR 13, PF,) 0.5 mL syrg injection     Diagnoses and all orders for this visit:    1. Essential hypertension with goal blood pressure less than 140/90  -     losartan-hydroCHLOROthiazide (HYZAAR) 100-25 mg per tablet; TAKE ONE TABLET BY MOUTH ONE TIME DAILY  -     metoprolol succinate (TOPROL-XL) 25 mg XL tablet; Take 1 Tab by mouth daily.  -     AMB POC HEMOGLOBIN Q4W  -     METABOLIC PANEL, COMPREHENSIVE    2. Alzheimer's dementia without behavioral disturbance, unspecified timing of dementia onset - daughter declines Cholinesterase inhibitors or NMDA receptor antagonists at this time. 3. Depression, unspecified depression type  -     ALPRAZolam (XANAX) 0.25 mg tablet; TAKE ONE TABLET BY MOUTH TWICE DAILY AS NEEDED FOR ANXIETY. MAX OF 2 TABLETS DAILY  -     sertraline (ZOLOFT) 50 mg tablet; 1 po bid    4. Routine general medical examination at a health care facility - per nurse navigator  -     AMB POC HEMOGLOBIN A1C  -     Lipid Panel ([id number])  -     pneumococcal 13 xavier conj dip (PREVNAR 13, PF,) 0.5 mL syrg injection; 0.5 mL by IntraMUSCular route once for 1 dose. 5. Screening for alcoholism    6.  Encounter for immunization  -     Tetanus, Diphtheria Toxoids and Acellular Pertussis Vaccine (TDAP)  -     pneumococcal 13 xavier conj dip (PREVNAR 13, PF,) 0.5 mL syrg injection; 0.5 mL by IntraMUSCular route once for 1 dose. 7. Screening for ischemic heart disease  -     Lipid Panel ([id number])      Follow-up Disposition:  Return in about 4 months (around 11/30/2017). lab results and schedule of future lab studies reviewed with patient  reviewed diet, exercise and weight control    I have reviewed the patient's allergies and made any necessary changes. Medical, procedural, social and family histories have been reviewed and updated as medically indicated. I have reconciled and/or revised patient medications in the EMR. I have discussed each diagnosis listed in this note with Norman Fernandez and/or their family. I have discussed treatment options and the risk/benefit analysis of those options, including safe use of medications and possible medication side effects. Through the use of shared decision making we have agreed to the above plan. The patient has received an after-visit summary and questions were answered concerning future plans. Ingrid Milton, ALEJA-C    This note will not be viewable in [employer] [facility].

## 2017-07-31 NOTE — PATIENT INSTRUCTIONS
Medicare Part B Preventive Services Limitations Recommendation Scheduled   Bone Mass Measurement  (age 72 & older, biennial) Requires diagnosis related to osteoporosis or estrogen deficiency. Biennial benefit unless patient has history of long-term glucocorticoid tx or baseline is needed because initial test was by other method  N/A   Cardiovascular Screening Blood Tests (every 5 years)  Total cholesterol, HDL, Triglycerides Order as a panel if possible  Done 4/2015   Colorectal Cancer Screening  -Fecal occult blood test (annual)  -Flexible sigmoidoscopy (5y)  -Screening colonoscopy (10y)  -Barium Enema   N/A   Counseling to Prevent Tobacco Use (up to 8 sessions per year)  - Counseling greater than 3 and up to 10 minutes  - Counseling greater than 10 minutes Patients must be asymptomatic of tobacco-related conditions to receive as preventive service  Non smoker   Diabetes Screening Tests (at least every 3 years, Medicare covers annually or at 6-month intervals for prediabetic patients)    Fasting blood sugar (FBS) or glucose tolerance test (GTT) Patient must be diagnosed with one of the following:  -Hypertension, Dyslipidemia, obesity, previous impaired FBS or GTT  Or any two of the following: overweight, FH of diabetes, age ? 72, history of gestational diabetes, birth of baby weighing more than 9 pounds  Last Blood glucose was 147 on 12/2016   Diabetes Self-Management Training (DSMT) (no USPSTF recommendation) Requires referral by treating physician for patient with diabetes or renal disease. 10 hours of initial DSMT session of no less than 30 minutes each in a continuous 12-month period. 2 hours of follow-up DSMT in subsequent years.   N/A   Glaucoma Screening (no USPSTF recommendation) Diabetes mellitus, family history, , age 48 or over,  American, age 72 or over  2/2014 Dr Judit Ashley due   Human Immunodeficiency Virus (HIV) Screening (annually for increased risk patients)  HIV-1 and HIV-2 by EIA, RENO, rapid antibody test, or oral mucosa transudate Patient must be at increased risk for HIV infection per USPSTF guidelines or pregnant. Tests covered annually for patients at increased risk. Pregnant patients may receive up to 3 test during pregnancy. Not high risk   Medical Nutrition Therapy (MNT) (for diabetes or renal disease not recommended schedule) Requires referral by treating physician for patient with diabetes or renal disease. Can be provided in same year as diabetes self-management training (DSMT), and CMS recommends medical nutrition therapy take place after DSMT. Up to 3 hours for initial year and 2 hours in subsequent years. N/A   Shingles Vaccination A shingles vaccine is also recommended once in a lifetime after age 61  N/A   Seasonal Influenza Vaccination (annually)   Fall 2017   Pneumococcal Vaccination (once after 72)   Prevnar 13 ordered today   Hepatitis B Vaccinations (if medium/high risk) Medium/high risk factors:  End-stage renal disease,  Hemophiliacs who received Factor VIII or IX concentrates, Clients of institutions for the mentally retarded, Persons who live in the same house as a HepB virus carrier, Homosexual men, Illicit injectable drug abusers. N/A   Screening Mammography (biennial age 54-69) Annually (age 36 or over)  Done 2/2014   Screening Pap Tests and Pelvic Examination (up to age 79 and after 79 if unknown history or abnormal study last 10 years) Every 25 months except high risk  Hysterectomy   Ultrasound Screening for Abdominal Aortic Aneurysm (AAA) (once) Patient must be referred through IPPE and not have had a screening for abdominal aortic aneurysm before under Medicare.   Limited to patients who meet one of the following criteria:  - Men who are 73-68 years old and have smoked more than 100 cigarettes in their lifetime.  -Anyone with a FH of AAA  -Anyone recommended for screening by USPSTF  N/A

## 2017-07-31 NOTE — PROGRESS NOTES
This is a Subsequent Medicare Annual Wellness Visit providing Personalized Prevention Plan Services (PPPS) (Performed 12 months after initial AWV and PPPS )    I have reviewed the patient's medical history in detail and updated the computerized patient record. History     Past Medical History:   Diagnosis Date    Anemia     Anxiety     DDD (degenerative disc disease), cervical     Depression     Esophagitis     FH: breast cancer     FH: hypertension     Hypercholesterolemia     Hypertension     MR (mitral regurgitation)     MVP (mitral valve prolapse)     Stool color black       Past Surgical History:   Procedure Laterality Date    ABDOMEN SURGERY PROC UNLISTED  2015     and    2016    Bowel Blockage    DILATE ESOPHAGUS  6/3/2011         ENDOSCOPY, COLON, DIAGNOSTIC      due 2016    HX ABDOMINAL LAPAROSCOPY  10/2015    lap enterolysis for obstructing solitary adhesive band    HX CATARACT REMOVAL      HX GI      esoph dilatation    HX GI  08/27/2016    LAPAROTOMY EXPLORATORY, Ileocecectomy.  HX HYSTERECTOMY      HX ORTHOPAEDIC      R shoulder replacement    [place] EGD TRANSORAL BIOPSY SINGLE/MULTIPLE  6/3/2011          Current Outpatient Prescriptions   Medication Sig Dispense Refill    metoprolol succinate (TOPROL-XL) 25 mg XL tablet Take 1 tablet by mouth nightly. 30 Tab 2    ALPRAZolam (XANAX) 0.25 mg tablet TAKE ONE TABLET BY MOUTH TWICE DAILY AS NEEDED FOR ANXIETY. MAX OF 2 TABLETS DAILY 60 Tab 2    losartan-hydroCHLOROthiazide (HYZAAR) 100-25 mg per tablet TAKE ONE TABLET BY MOUTH ONE TIME DAILY 30 Tab 3    sertraline (ZOLOFT) 50 mg tablet 1 po bid 60 Tab 3    ACETAMINOPHEN/DIPHENHYDRAMINE (TYLENOL PM PO) Take  by mouth.  aspirin 81 mg chewable tablet Take 81 mg by mouth daily.        No Known Allergies  Family History   Problem Relation Age of Onset    Cancer Mother      breast    Hypertension Mother     Diabetes Mother     Cancer Father      head and neck    Hypertension Father     Cancer Brother     Cancer Maternal Aunt      Social History   Substance Use Topics    Smoking status: Former Smoker     Quit date: 8/5/1960    Smokeless tobacco: Never Used    Alcohol use No     Patient Active Problem List   Diagnosis Code    Hypercholesterolemia E78.00    MVP (mitral valve prolapse) I34.1    DDD (degenerative disc disease), cervical M50.30    Esophagitis 530.1    Encounter for long-term (current) use of other medications Z79.899    MR (mitral regurgitation) I34.0    Essential hypertension I10    SBO (small bowel obstruction) (HCC) K56.69    Depression F32.9    Intussusception of small intestine ([address] K56.1    Intussusception, ileocecal ([address] K56.1    Malignant neoplasm of ascending colon (HCC) C18.2       Depression Risk Factor Screening:     PHQ over the last two weeks 6/5/2015   Little interest or pleasure in doing things Not at all   Feeling down, depressed or hopeless Several days   Total Score PHQ 2 1     Alcohol Risk Factor Screening: On any occasion during the past 3 months, have you had more than 3 drinks containing alcohol? No    Do you average more than 7 drinks per week? No        Functional Ability and Level of Safety:     Hearing Loss   moderate    Activities of Daily Living   Self-care. Requires assistance with: ambulation, bathing and hygiene, grooming, dressing and no ADLs    Fall Risk   Fall Risk Assessment, last 12 mths 7/31/2017   Able to walk? Yes   Fall in past 12 months? No   Fall with injury? -   Number of falls in past 12 months -   Fall Risk Score -     Abuse Screen   Patient is not abused    Review of Systems   Not required    Physical Examination     Evaluation of Cognitive Function:  Mood/affect:  neutral  Appearance: age appropriate  Family member/caregiver input:daughterJulia    No exam performed today, as directed by Reji Franks NP.     Patient Care Team:  Don Amanda NP as PCP - General (Nurse Practitioner)  Dorita Washington MD (Cardiology)  Federico Eldridge MD as Surgeon (General Surgery)   David Mckeon    Advice/Referrals/Counseling   Education and counseling provided:  End-of-Life planning (with patient's consent) Advanced Directives discussed with patient, given Your Right to Decide booklet and Advanced Directive paperwork to completed and bring back for chart.       Assessment/Plan   As directed by Twin GIMENEZ.

## 2017-07-31 NOTE — MR AVS SNAPSHOT
Visit Information Date & Time Provider Department Dept. Phone Encounter #  
 7/31/2017 10:30 AM Truman Osler, [place] [phone] [id number] Follow-up Instructions Return in about 4 months (around 11/30/2017). Upcoming Health Maintenance Date Due DTaP/Tdap/Td series (1 - Tdap) 8/19/1955 GLAUCOMA SCREENING Q2Y 2/10/2016 MEDICARE YEARLY EXAM 4/30/2016 Pneumococcal 65+ High/Highest Risk (2 of 2 - PPSV23) 9/15/2016 INFLUENZA AGE 9 TO ADULT 8/1/2017 Allergies as of 7/31/2017  Review Complete On: 7/31/2017 By: Scott Bee RN No Known Allergies Current Immunizations  Reviewed on 10/18/2015 Name Date Influenza High Dose Vaccine PF 1/28/2015 Influenza Vaccine 10/3/2013 Influenza Vaccine (Quad) PF 10/18/2015  8:45 AM  
 Influenza Vaccine Split 8/31/2012, 9/15/2011 Tdap  Incomplete ZZZ-RETIRED (DO NOT USE) Pneumococcal Vaccine (Unspecified Type) 9/15/2011 Not reviewed this visit You Were Diagnosed With   
  
 Codes Comments Essential hypertension with goal blood pressure less than 140/90    -  Primary ICD-10-CM: I10 
ICD-9-CM: 401.9 Alzheimer's dementia without behavioral disturbance, unspecified timing of dementia onset     ICD-10-CM: G30.9, F02.80 ICD-9-CM: 331.0, 294.10 Depression, unspecified depression type     ICD-10-CM: F32.9 ICD-9-CM: 080 Routine general medical examination at a health care facility     ICD-10-CM: Z00.00 ICD-9-CM: V70.0 Screening for alcoholism     ICD-10-CM: Z13.89 ICD-9-CM: V79.1 Encounter for immunization     ICD-10-CM: R50 ICD-9-CM: V03.89 Screening for ischemic heart disease     ICD-10-CM: Z13.6 ICD-9-CM: V81.0 Vitals BP Pulse Temp Resp Height(growth percentile) Weight(growth percentile) 135/47 (!) 54 95.6 °F (35.3 °C) (Oral) 20 5' 5\" (1.651 m) 109 lb (49.4 kg) LMP SpO2 BMI OB Status Smoking Status (LMP Unknown) 96% 18.14 kg/m2 Hysterectomy Former Smoker Vitals History BMI and BSA Data Body Mass Index Body Surface Area  
 18.14 kg/m 2 1.51 m 2 Preferred Pharmacy Pharmacy Name Phone [facility] #[address] [phone] Your Updated Medication List  
  
   
This list is accurate as of: 17 11:47 AM.  Always use your most recent med list.  
  
  
  
  
 ALPRAZolam 0.25 mg tablet Commonly known as:  XANAX  
TAKE ONE TABLET BY MOUTH TWICE DAILY AS NEEDED FOR ANXIETY. MAX OF 2 TABLETS DAILY  
  
 aspirin 81 mg chewable tablet Take 81 mg by mouth daily. losartan-hydroCHLOROthiazide 100-25 mg per tablet Commonly known as:  HYZAAR  
TAKE ONE TABLET BY MOUTH ONE TIME DAILY  
  
 metoprolol succinate 25 mg XL tablet Commonly known as:  TOPROL-XL Take 1 Tab by mouth daily. pneumococcal 13 xavier conj dip 0.5 mL Syrg injection Commonly known as:  PREVNAR 13 (PF)  
0.5 mL by IntraMUSCular route once for 1 dose. sertraline 50 mg tablet Commonly known as:  ZOLOFT  
1 po bid TYLENOL PM PO Take  by mouth. Prescriptions Printed Refills ALPRAZolam (XANAX) 0.25 mg tablet 3 Sig: TAKE ONE TABLET BY MOUTH TWICE DAILY AS NEEDED FOR ANXIETY. MAX OF 2 TABLETS DAILY Class: Print  
 losartan-hydroCHLOROthiazide (HYZAAR) 100-25 mg per tablet 11 Sig: TAKE ONE TABLET BY MOUTH ONE TIME DAILY Class: Print  
 sertraline (ZOLOFT) 50 mg tablet 11 Si po bid Class: Print  
 metoprolol succinate (TOPROL-XL) 25 mg XL tablet 11 Sig: Take 1 Tab by mouth daily. Class: Print Route: Oral  
 pneumococcal 13 xavier conj dip (PREVNAR 13, PF,) 0.5 mL syrg injection 0 Si.5 mL by IntraMUSCular route once for 1 dose. Class: Print Route: IntraMUSCular We Performed the Following AMB POC HEMOGLOBIN A1C [64671 CPT(R)] LIPID PANEL [04018 CPT(R)] METABOLIC PANEL, COMPREHENSIVE [16176 CPT(R)] TETANUS, DIPHTHERIA TOXOIDS AND ACELLULAR PERTUSSIS VACCINE (TDAP), IN INDIVIDS. >=7,  [id number] CPT(R)] Follow-up Instructions Return in about 4 months (around 11/30/2017). Patient Instructions Medicare Part B Preventive Services Limitations Recommendation Scheduled Bone Mass Measurement 
(age 72 & older, biennial) Requires diagnosis related to osteoporosis or estrogen deficiency. Biennial benefit unless patient has history of long-term glucocorticoid tx or baseline is needed because initial test was by other method  N/A Cardiovascular Screening Blood Tests (every 5 years) Total cholesterol, HDL, Triglycerides Order as a panel if possible  Done 4/2015 Colorectal Cancer Screening 
-Fecal occult blood test (annual) -Flexible sigmoidoscopy (5y) 
-Screening colonoscopy (10y) -Barium Enema   N/A Counseling to Prevent Tobacco Use (up to 8 sessions per year) - Counseling greater than 3 and up to 10 minutes - Counseling greater than 10 minutes Patients must be asymptomatic of tobacco-related conditions to receive as preventive service  Non smoker Diabetes Screening Tests (at least every 3 years, Medicare covers annually or at 6-month intervals for prediabetic patients) Fasting blood sugar (FBS) or glucose tolerance test (GTT) Patient must be diagnosed with one of the following: 
-Hypertension, Dyslipidemia, obesity, previous impaired FBS or GTT 
Or any two of the following: overweight, FH of diabetes, age ? 72, history of gestational diabetes, birth of baby weighing more than 9 pounds  Last Blood glucose was 147 on 12/2016 Diabetes Self-Management Training (DSMT) (no USPSTF recommendation) Requires referral by treating physician for patient with diabetes or renal disease. 10 hours of initial DSMT session of no less than 30 minutes each in a continuous 12-month period. 2 hours of follow-up DSMT in subsequent years.   N/A  
 Glaucoma Screening (no USPSTF recommendation) Diabetes mellitus, family history, , age 48 or over,  American, age 72 or over  2/2014 Dr Tonie Colon due Human Immunodeficiency Virus (HIV) Screening (annually for increased risk patients) HIV-1 and HIV-2 by EIA, RENO, rapid antibody test, or oral mucosa transudate Patient must be at increased risk for HIV infection per USPSTF guidelines or pregnant. Tests covered annually for patients at increased risk. Pregnant patients may receive up to 3 test during pregnancy. Not high risk Medical Nutrition Therapy (MNT) (for diabetes or renal disease not recommended schedule) Requires referral by treating physician for patient with diabetes or renal disease. Can be provided in same year as diabetes self-management training (DSMT), and CMS recommends medical nutrition therapy take place after DSMT. Up to 3 hours for initial year and 2 hours in subsequent years. N/A Shingles Vaccination A shingles vaccine is also recommended once in a lifetime after age 61  N/A Seasonal Influenza Vaccination (annually)   Fall 2017 Pneumococcal Vaccination (once after 65)   Prevnar 13 ordered today Hepatitis B Vaccinations (if medium/high risk) Medium/high risk factors:  End-stage renal disease, Hemophiliacs who received Factor VIII or IX concentrates, Clients of institutions for the mentally retarded, Persons who live in the same house as a HepB virus carrier, Homosexual men, Illicit injectable drug abusers. N/A Screening Mammography (biennial age 54-69) Annually (age 36 or over)  Done 2/2014 Screening Pap Tests and Pelvic Examination (up to age 79 and after 79 if unknown history or abnormal study last 10 years) Every 24 months except high risk  Hysterectomy Ultrasound Screening for Abdominal Aortic Aneurysm (AAA) (once) Patient must be referred through IPPE and not have had a screening for abdominal aortic aneurysm before under Medicare. Limited to patients who meet one of the following criteria: 
- Men who are 73-68 years old and have smoked more than 100 cigarettes in their lifetime. 
-Anyone with a FH of AAA 
-Anyone recommended for screening by USPSTF  N/A Introducing [address]! Yaquelin Pierce introduces [employer] patient portal. Now you can access parts of your medical record, email your doctor's office, and request medication refills online. 1. In your internet browser, go to [email or web address]. [facility] [facility] 2. Click on the First Time User? Click Here link in the Sign In box. You will see the New Member Sign Up page. 3. Enter your [employer] Access Code exactly as it appears below. You will not need to use this code after youve completed the sign-up process. If you do not sign up before the expiration date, you must request a new code. · [employer] Access Code: [email or web address] Expires: 8/29/2017  2:32 PM 
 
4. Enter the last four digits of your Social Security Number (xxxx) and Date of Birth (mm/dd/yyyy) as indicated and click Submit. You will be taken to the next sign-up page. 5. Create a [employer] ID. This will be your [employer] login ID and cannot be changed, so think of one that is secure and easy to remember. 6. Create a [employer] password. You can change your password at any time. 7. Enter your Password Reset Question and Answer. This can be used at a later time if you forget your password. 8. Enter your e-mail address. You will receive e-mail notification when new information is available in [address] 9. Click Sign Up. You can now view and download portions of your medical record. 10. Click the Download Summary menu link to download a portable copy of your medical information. If you have questions, please visit the Frequently Asked Questions section of the [employer] website. Remember, [employer] is NOT to be used for urgent needs. For medical emergencies, dial 911. Now available from your iPhone and Android! Please provide this summary of care documentation to your next provider. Your primary care clinician is listed as [facility]. If you have any questions after today's visit, please call [phone].

## 2017-09-15 NOTE — PATIENT INSTRUCTIONS
Vaccine Information Statement    Influenza (Flu) Vaccine (Inactivated or Recombinant): What you need to know    Many Vaccine Information Statements are available in Mongolian and other languages. See www.immunize.org/vis  Hojas de Información Sobre Vacunas están disponibles en Español y en muchos otros idiomas. Visite www.immunize.org/vis    1. Why get vaccinated? Influenza (flu) is a contagious disease that spreads around the [place] every year, usually between October and May. Flu is caused by influenza viruses, and is spread mainly by coughing, sneezing, and close contact. Anyone can get flu. Flu strikes suddenly and can last several days. Symptoms vary by age, but can include:   fever/chills   sore throat   muscle aches   fatigue   cough   headache    runny or stuffy nose    Flu can also lead to pneumonia and blood infections, and cause diarrhea and seizures in children. If you have a medical condition, such as heart or lung disease, flu can make it worse. Flu is more dangerous for some people. Infants and young children, people 72years of age and older, pregnant women, and people with certain health conditions or a weakened immune system are at greatest risk. Each year thousands of people in the [facility] die from flu, and many more are hospitalized. Flu vaccine can:   keep you from getting flu,   make flu less severe if you do get it, and   keep you from spreading flu to your family and other people. 2. Inactivated and recombinant flu vaccines    A dose of flu vaccine is recommended every flu season. Children 6 months through 6years of age may need two doses during the same flu season. Everyone else needs only one dose each flu season.        Some inactivated flu vaccines contain a very small amount of a mercury-based preservative called thimerosal. Studies have not shown thimerosal in vaccines to be harmful, but flu vaccines that do not contain thimerosal are available. There is no live flu virus in flu shots. They cannot cause the flu. There are many flu viruses, and they are always changing. Each year a new flu vaccine is made to protect against three or four viruses that are likely to cause disease in the upcoming flu season. But even when the vaccine doesnt exactly match these viruses, it may still provide some protection    Flu vaccine cannot prevent:   flu that is caused by a virus not covered by the vaccine, or   illnesses that look like flu but are not. It takes about 2 weeks for protection to develop after vaccination, and protection lasts through the flu season. 3. Some people should not get this vaccine    Tell the person who is giving you the vaccine:     If you have any severe, life-threatening allergies. If you ever had a life-threatening allergic reaction after a dose of flu vaccine, or have a severe allergy to any part of this vaccine, you may be advised not to get vaccinated. Most, but not all, types of flu vaccine contain a small amount of egg protein.  If you ever had Guillain-Barré Syndrome (also called GBS). Some people with a history of GBS should not get this vaccine. This should be discussed with your doctor.  If you are not feeling well. It is usually okay to get flu vaccine when you have a mild illness, but you might be asked to come back when you feel better. 4. Risks of a vaccine reaction    With any medicine, including vaccines, there is a chance of reactions. These are usually mild and go away on their own, but serious reactions are also possible. Most people who get a flu shot do not have any problems with it.      Minor problems following a flu shot include:    soreness, redness, or swelling where the shot was given     hoarseness   sore, red or itchy eyes   cough   fever   aches   headache   itching   fatigue  If these problems occur, they usually begin soon after the shot and last 1 or 2 days. More serious problems following a flu shot can include the following:     There may be a small increased risk of Guillain-Barré Syndrome (GBS) after inactivated flu vaccine. This risk has been estimated at 1 or 2 additional cases per million people vaccinated. This is much lower than the risk of severe complications from flu, which can be prevented by flu vaccine.  Young children who get the flu shot along with pneumococcal vaccine (PCV13) and/or DTaP vaccine at the same time might be slightly more likely to have a seizure caused by fever. Ask your doctor for more information. Tell your doctor if a child who is getting flu vaccine has ever had a seizure. Problems that could happen after any injected vaccine:      People sometimes faint after a medical procedure, including vaccination. Sitting or lying down for about 15 minutes can help prevent fainting, and injuries caused by a fall. Tell your doctor if you feel dizzy, or have vision changes or ringing in the ears.  Some people get severe pain in the shoulder and have difficulty moving the arm where a shot was given. This happens very rarely.  Any medication can cause a severe allergic reaction. Such reactions from a vaccine are very rare, estimated at about 1 in a million doses, and would happen within a few minutes to a few hours after the vaccination. As with any medicine, there is a very remote chance of a vaccine causing a serious injury or death. The safety of vaccines is always being monitored. For more information, visit: www.cdc.gov/vaccinesafety/    5. What if there is a serious reaction? What should I look for?  Look for anything that concerns you, such as signs of a severe allergic reaction, very high fever, or unusual behavior.     Signs of a severe allergic reaction can include hives, swelling of the face and throat, difficulty breathing, a fast heartbeat, dizziness, and weakness  usually within a few minutes to a few hours after the vaccination. What should I do?  If you think it is a severe allergic reaction or other emergency that cant wait, call 9-1-1 and get the person to the nearest hospital. Otherwise, call your doctor.  Reactions should be reported to the Vaccine Adverse Event Reporting System (VAERS). Your doctor should file this report, or you can do it yourself through  the VAERS web site at www.vaers. [facility].gov, or by calling 3-110.957.4727. VAERS does not give medical advice. 6. The National Vaccine Injury Compensation Program    The [facility] Vaccine Injury Compensation Program (VICP) is a federal program that was created to compensate people who may have been injured by certain vaccines. Persons who believe they may have been injured by a vaccine can learn about the program and about filing a claim by calling 1-809.393.7789 or visiting the [employer] website at www.[facility].gov/vaccinecompensation. There is a time limit to file a claim for compensation. 7. How can I learn more?  Ask your healthcare provider. He or she can give you the vaccine package insert or suggest other sources of information.  Call your local or state health department.  Contact the Centers for Disease Control and Prevention (CDC):  - Call 2-146.243.5128 (1-800-CDC-INFO) or  - Visit CDCs website at www.cdc.gov/flu    Vaccine Information Statement   Inactivated Influenza Vaccine   8/7/2015  42 [address]. [address] Cons [id number]    Department of Health and Human Services  Centers for Disease Control and Prevention    Office Use Only       Helping a Person With Alzheimer's Disease: Care Instructions  Your Care Instructions  Alzheimer's disease is a type of dementia. It affects memory, intelligence, judgment, language, and behavior. It is not clear what causes this disease. But it is the most common form of dementia in older adults. It may take many years to develop.   Alzheimer's disease is different than mild memory loss that occurs with aging. Family members usually notice symptoms first. But the person also may realize that something is wrong. Follow-up care is a key part of your loved one's treatment and safety. Be sure to make and go to all appointments, and call your doctor if your loved one is having problems. It's also a good idea to know your loved one's test results and keep a list of the medicines he or she takes. How can you care for your loved one at home? · Develop a routine. The person will feel less frustrated or confused with a clear, simple daily plan. Remind him or her about important facts and events. · Be patient. It may take longer for the person to complete a task than it used to. · Help the person eat a balanced diet. Serve plenty of whole grains, fruits, and vegetables every day. If the person is not eating well at mealtimes, give snacks at midmorning and in the afternoon. Offer drinks such as Boost, Ensure, or Sustacal if he or she is losing weight. · Encourage exercise. Walking and other activity may slow the decline of mental ability. Help the person keep an active mind. Encourage hobbies such as reading and crossword puzzles. · Take steps to help if the person is sundowning. This is the restless behavior and trouble with sleeping that may occur in late afternoon and at night. Try not to let the person nap during the day. Offer a glass of warm milk or caffeine-free tea before bedtime. · Ask family members and friends for help. You may need breaks where others can help care for the person. · Talk to the person's doctor about what resources are available for help in your area. · Review all of the person's medicines with his or her doctor. · For as long as the person is able, allow him or her to make decisions about activities, food, clothing, and other choices. Let the person be independent, even if tasks take more time or are not done perfectly. Tailor tasks to the person's abilities.  For example, if cooking is no longer safe, ask for other help. He or she can help set the table or make simple dishes such as a salad. When the person needs help, offer it gently. Keeping safe  · Make your home (or the person's home) safe. Tack down rugs, and put no-slip tape in the tub. Install handrails, and put safety switches on stoves and appliances. Keep rooms free of clutter. Make sure walkways around furniture are clear. Do not move furniture around, because the person may become confused. · Use locks on doors and cupboards. Lock up knives, scissors, medicines, cleaning supplies, and other dangerous things. · Do not let the person drive or cook if he or she cannot do it safely. A person with Alzheimer's should not drive unless he or she is able to pass an on-road driving test. Your state 's license bureau can do a driving test if there is any question. · Get medical alert jewelry for the person so you can be contacted if he or she wanders away. If possible, provide a safe place for wandering, such as an enclosed yard or garden. When should you call for help? Call 911 anytime you think you may need emergency care. For example, call if:  · A person who has Alzheimer's disease has disappeared. · A person who has Alzheimer's disease is seriously injured. Call your doctor now or seek immediate medical care if:  · The person you are caring for suddenly sees or hears things that are not there (hallucinates). · The person you are caring for has a sudden, drastic change in his or her behavior. Watch closely for changes in your loved one's health, and be sure to contact the doctor if:  · A person who has Alzheimer's disease gradually gets worse or has symptoms that could cause injury. · You need help caring for a person with Alzheimer's disease. · The person has problems with his or her medicine. Where can you learn more? Go to http://derek.info/.   Enter R144 in the search box to learn more about \"Helping a Person With Alzheimer's Disease: Care Instructions. \"  Current as of: July 26, 2016  Content Version: 11.3  © 2247-7906 [employer], [employer]. Care instructions adapted under license by [employer] (which disclaims liability or warranty for this information). If you have questions about a medical condition or this instruction, always ask your healthcare professional. [place] [address] any warranty or liability for your use of this information.

## 2017-09-15 NOTE — MR AVS SNAPSHOT
Visit Information Date & Time Provider Department Dept. Phone Encounter #  
 9/15/2017  9:30 AM Jumana Houston NP [facility] [phone] [id number] Follow-up Instructions Return in about 6 weeks (around 10/27/2017), or if symptoms worsen or fail to improve. Upcoming Health Maintenance Date Due  
 GLAUCOMA SCREENING Q2Y 5/13/2016 Pneumococcal 65+ High/Highest Risk (2 of 2 - PPSV23) 9/15/2016 INFLUENZA AGE 9 TO ADULT 8/1/2017 MEDICARE YEARLY EXAM 8/1/2018 DTaP/Tdap/Td series (2 - Td) 7/31/2027 Allergies as of 9/15/2017  Review Complete On: 9/15/2017 By: Rajesh Oliva LPN No Known Allergies Current Immunizations  Reviewed on 10/18/2015 Name Date Influenza High Dose Vaccine PF 9/15/2017  9:57 AM, 1/28/2015 Influenza Vaccine 10/3/2013 Influenza Vaccine (Quad) PF 10/18/2015  8:45 AM  
 Influenza Vaccine Split 8/31/2012, 9/15/2011 Tdap 7/31/2017 12:11 PM  
 ZZZ-RETIRED (DO NOT USE) Pneumococcal Vaccine (Unspecified Type) 9/15/2011 Not reviewed this visit You Were Diagnosed With   
  
 Codes Comments Alzheimer's dementia with behavioral disturbance, unspecified timing of dementia onset    -  Primary ICD-10-CM: G30.8, F02.81 ICD-9-CM: 331.0, 294.11 Encounter for immunization     ICD-10-CM: F25 ICD-9-CM: V03.89 Vitals BP Pulse Temp Resp Height(growth percentile) Weight(growth percentile) 150/57 (!) 53 97.4 °F (36.3 °C) (Oral) 20 5' 5\" (1.651 m) 105 lb (47.6 kg) LMP SpO2 BMI OB Status Smoking Status (LMP Unknown) 98% 17.47 kg/m2 Hysterectomy Former Smoker Vitals History BMI and BSA Data Body Mass Index Body Surface Area  
 17.47 kg/m 2 1.48 m 2 Preferred Pharmacy Pharmacy Name Phone FOOD [id number] Adrianne Funes [facility] [phone] Your Updated Medication List  
  
   
 This list is accurate as of: 9/15/17 10:45 AM.  Always use your most recent med list.  
  
  
  
  
 ALPRAZolam 0.25 mg tablet Commonly known as:  XANAX  
TAKE ONE TABLET BY MOUTH TWICE DAILY AS NEEDED FOR ANXIETY. MAX OF 2 TABLETS DAILY  
  
 aspirin 81 mg chewable tablet Take 81 mg by mouth daily. donepezil 5 mg tablet Commonly known as:  ARICEPT Take 1 Tab by mouth nightly.  
  
 gabapentin 100 mg capsule Commonly known as:  NEURONTIN Take 1 Cap by mouth nightly. Can increase to BID after 1 week  
  
 losartan-hydroCHLOROthiazide 100-25 mg per tablet Commonly known as:  HYZAAR  
TAKE ONE TABLET BY MOUTH ONE TIME DAILY  
  
 metoprolol succinate 25 mg XL tablet Commonly known as:  TOPROL-XL Take 1 Tab by mouth daily. sertraline 50 mg tablet Commonly known as:  ZOLOFT  
1 po bid TYLENOL PM PO Take  by mouth. Prescriptions Sent to Pharmacy Refills  
 donepezil (ARICEPT) 5 mg tablet 1 Sig: Take 1 Tab by mouth nightly. Class: Normal  
 Pharmacy: [facility] [address] [place] [address] #: [phone] Route: Oral  
 gabapentin (NEURONTIN) 100 mg capsule 1 Sig: Take 1 Cap by mouth nightly. Can increase to BID after 1 week Class: Normal  
 Pharmacy: [facility] [address] [place] [address] #: [phone] Route: Oral  
  
We Performed the Following INFLUENZA VIRUS VACCINE, HIGH DOSE SEASONAL, PRESERVATIVE FREE [11699 CPT(R)] REFERRAL TO NEUROPSYCHOLOGY [OSL65 Custom] Comments:  
 Please evaluate patient for alzheimers with increase in agitation and anxiety. Follow-up Instructions Return in about 6 weeks (around 10/27/2017), or if symptoms worsen or fail to improve. Referral Information Referral ID Referred By Referred To  
  
 [id number] Reva MURPHY Not Available Visits Status Start Date End Date 1 New Request 9/15/17 9/15/18 If your referral has a status of pending review or denied, additional information will be sent to support the outcome of this decision. Patient Instructions Vaccine Information Statement Influenza (Flu) Vaccine (Inactivated or Recombinant): What you need to know Many Vaccine Information Statements are available in Chinese and other languages. See www.immunize.org/vis Hojas de Información Sobre Vacunas están disponibles en Español y en muchos otros idiomas. Visite www.immunize.org/vis 1. Why get vaccinated? Influenza (flu) is a contagious disease that spreads around the [place] every year, usually between October and May. Flu is caused by influenza viruses, and is spread mainly by coughing, sneezing, and close contact. Anyone can get flu. Flu strikes suddenly and can last several days. Symptoms vary by age, but can include: 
 fever/chills  sore throat  muscle aches  fatigue  cough  headache  runny or stuffy nose Flu can also lead to pneumonia and blood infections, and cause diarrhea and seizures in children. If you have a medical condition, such as heart or lung disease, flu can make it worse. Flu is more dangerous for some people. Infants and young children, people 72years of age and older, pregnant women, and people with certain health conditions or a weakened immune system are at greatest risk. Each year thousands of people in the [facility] die from flu, and many more are hospitalized. Flu vaccine can: 
 keep you from getting flu, 
 make flu less severe if you do get it, and 
 keep you from spreading flu to your family and other people. 2. Inactivated and recombinant flu vaccines A dose of flu vaccine is recommended every flu season. Children 6 months through 6years of age may need two doses during the same flu season. Everyone else needs only one dose each flu season. Some inactivated flu vaccines contain a very small amount of a mercury-based preservative called thimerosal. Studies have not shown thimerosal in vaccines to be harmful, but flu vaccines that do not contain thimerosal are available. There is no live flu virus in flu shots. They cannot cause the flu. There are many flu viruses, and they are always changing. Each year a new flu vaccine is made to protect against three or four viruses that are likely to cause disease in the upcoming flu season. But even when the vaccine doesnt exactly match these viruses, it may still provide some protection Flu vaccine cannot prevent: 
 flu that is caused by a virus not covered by the vaccine, or 
 illnesses that look like flu but are not. It takes about 2 weeks for protection to develop after vaccination, and protection lasts through the flu season. 3. Some people should not get this vaccine Tell the person who is giving you the vaccine:  If you have any severe, life-threatening allergies. If you ever had a life-threatening allergic reaction after a dose of flu vaccine, or have a severe allergy to any part of this vaccine, you may be advised not to get vaccinated. Most, but not all, types of flu vaccine contain a small amount of egg protein.  If you ever had Guillain-Barré Syndrome (also called GBS). Some people with a history of GBS should not get this vaccine. This should be discussed with your doctor.  If you are not feeling well. It is usually okay to get flu vaccine when you have a mild illness, but you might be asked to come back when you feel better. 4. Risks of a vaccine reaction With any medicine, including vaccines, there is a chance of reactions. These are usually mild and go away on their own, but serious reactions are also possible. Most people who get a flu shot do not have any problems with it. Minor problems following a flu shot include:  soreness, redness, or swelling where the shot was given  hoarseness  sore, red or itchy eyes  cough  fever  aches  headache  itching  fatigue If these problems occur, they usually begin soon after the shot and last 1 or 2 days. More serious problems following a flu shot can include the following:  There may be a small increased risk of Guillain-Barré Syndrome (GBS) after inactivated flu vaccine. This risk has been estimated at 1 or 2 additional cases per million people vaccinated. This is much lower than the risk of severe complications from flu, which can be prevented by flu vaccine.  Young children who get the flu shot along with pneumococcal vaccine (PCV13) and/or DTaP vaccine at the same time might be slightly more likely to have a seizure caused by fever. Ask your doctor for more information. Tell your doctor if a child who is getting flu vaccine has ever had a seizure. Problems that could happen after any injected vaccine:  People sometimes faint after a medical procedure, including vaccination. Sitting or lying down for about 15 minutes can help prevent fainting, and injuries caused by a fall. Tell your doctor if you feel dizzy, or have vision changes or ringing in the ears.  Some people get severe pain in the shoulder and have difficulty moving the arm where a shot was given. This happens very rarely.  Any medication can cause a severe allergic reaction. Such reactions from a vaccine are very rare, estimated at about 1 in a million doses, and would happen within a few minutes to a few hours after the vaccination. As with any medicine, there is a very remote chance of a vaccine causing a serious injury or death. The safety of vaccines is always being monitored. For more information, visit: www.cdc.gov/vaccinesafety/ 
 
5. What if there is a serious reaction? What should I look for?  Look for anything that concerns you, such as signs of a severe allergic reaction, very high fever, or unusual behavior. Signs of a severe allergic reaction can include hives, swelling of the face and throat, difficulty breathing, a fast heartbeat, dizziness, and weakness  usually within a few minutes to a few hours after the vaccination. What should I do?  If you think it is a severe allergic reaction or other emergency that cant wait, call 9-1-1 and get the person to the nearest hospital. Otherwise, call your doctor.  Reactions should be reported to the Vaccine Adverse Event Reporting System (VAERS). Your doctor should file this report, or you can do it yourself through  the VAERS web site at www.vaers. [facility].gov, or by calling 6-868.529.4036. VAERS does not give medical advice. 6. The National Vaccine Injury Compensation Program 
 
The [facility] Vaccine Injury Compensation Program (VICP) is a federal program that was created to compensate people who may have been injured by certain vaccines. Persons who believe they may have been injured by a vaccine can learn about the program and about filing a claim by calling 3-340.647.4281 or visiting the [facility] [employer] [address] website at www.[facility].gov/vaccinecompensation. There is a time limit to file a claim for compensation. 7. How can I learn more?  Ask your healthcare provider. He or she can give you the vaccine package insert or suggest other sources of information.  Call your local or state health department.  Contact the Centers for Disease Control and Prevention (CDC): 
- Call 0-928.674.3193 (1-800-CDC-INFO) or 
- Visit CDCs website at www.cdc.gov/flu Vaccine Information Statement Inactivated Influenza Vaccine 8/7/2015 
42 BOBKatie Marin Jonh 433OO-84 Department of Health and [employer] Centers for Disease Control and Prevention Office Use Only Helping a Person With Alzheimer's Disease: Care Instructions Your Care Instructions Alzheimer's disease is a type of dementia. It affects memory, intelligence, judgment, language, and behavior. It is not clear what causes this disease. But it is the most common form of dementia in older adults. It may take many years to develop. Alzheimer's disease is different than mild memory loss that occurs with aging. Family members usually notice symptoms first. But the person also may realize that something is wrong. Follow-up care is a key part of your loved one's treatment and safety. Be sure to make and go to all appointments, and call your doctor if your loved one is having problems. It's also a good idea to know your loved one's test results and keep a list of the medicines he or she takes. How can you care for your loved one at home? · Develop a routine. The person will feel less frustrated or confused with a clear, simple daily plan. Remind him or her about important facts and events. · Be patient. It may take longer for the person to complete a task than it used to. · Help the person eat a balanced diet. Serve plenty of whole grains, fruits, and vegetables every day. If the person is not eating well at mealtimes, give snacks at midmorning and in the afternoon. Offer drinks such as Boost, Ensure, or Sustacal if he or she is losing weight. · Encourage exercise. Walking and other activity may slow the decline of mental ability. Help the person keep an active mind. Encourage hobbies such as reading and crossword puzzles. · Take steps to help if the person is sundowning. This is the restless behavior and trouble with sleeping that may occur in late afternoon and at night. Try not to let the person nap during the day. Offer a glass of warm milk or caffeine-free tea before bedtime. · Ask family members and friends for help. You may need breaks where others can help care for the person. · Talk to the person's doctor about what resources are available for help in your area. · Review all of the person's medicines with his or her doctor. · For as long as the person is able, allow him or her to make decisions about activities, food, clothing, and other choices. Let the person be independent, even if tasks take more time or are not done perfectly. Tailor tasks to the person's abilities. For example, if cooking is no longer safe, ask for other help. He or she can help set the table or make simple dishes such as a salad. When the person needs help, offer it gently. Keeping safe · Make your home (or the person's home) safe. Tack down rugs, and put no-slip tape in the tub. Install handrails, and put safety switches on stoves and appliances. Keep rooms free of clutter. Make sure walkways around furniture are clear. Do not move furniture around, because the person may become confused. · Use locks on doors and cupboards. Lock up knives, scissors, medicines, cleaning supplies, and other dangerous things. · Do not let the person drive or cook if he or she cannot do it safely. A person with Alzheimer's should not drive unless he or she is able to pass an on-road driving test. Your state 's license bureau can do a driving test if there is any question. · Get medical alert jewelry for the person so you can be contacted if he or she wanders away. If possible, provide a safe place for wandering, such as an enclosed yard or garden. When should you call for help? Call 911 anytime you think you may need emergency care. For example, call if: · A person who has Alzheimer's disease has disappeared. · A person who has Alzheimer's disease is seriously injured. Call your doctor now or seek immediate medical care if: · The person you are caring for suddenly sees or hears things that are not there (hallucinates). · The person you are caring for has a sudden, drastic change in his or her behavior. Watch closely for changes in your loved one's health, and be sure to contact the doctor if: · A person who has Alzheimer's disease gradually gets worse or has symptoms that could cause injury. · You need help caring for a person with Alzheimer's disease. · The person has problems with his or her medicine. Where can you learn more? Go to http://gautam-nathaniel.info/. Enter S552 in the search box to learn more about \"Helping a Person With Alzheimer's Disease: Care Instructions. \" Current as of: July 26, 2016 Content Version: 11.3 © 6726-8890 [employer]. Care instructions adapted under license by [employer] (which disclaims liability or warranty for this information). If you have questions about a medical condition or this instruction, always ask your healthcare professional. [address] any warranty or liability for your use of this information. Introducing [facility]! Julissa Liz introduces [employer] patient portal. Now you can access parts of your medical record, email your doctor's office, and request medication refills online. 1. In your internet browser, go to [email or web address] 2. Click on the First Time User? Click Here link in the Sign In box. You will see the New Member Sign Up page. 3. Enter your [employer] Access Code exactly as it appears below. You will not need to use this code after youve completed the sign-up process. If you do not sign up before the expiration date, you must request a new code. · [employer] Access Code: [email or web address] Expires: 11/30/2017  1:22 PM 
 
4. Enter the last four digits of your Social Security Number (xxxx) and Date of Birth (mm/dd/yyyy) as indicated and click Submit. You will be taken to the next sign-up page. 5. Create a [employer]i ID. This will be your [employer] login ID and cannot be changed, so think of one that is secure and easy to remember. 6. Create a [employer] password. You can change your password at any time. 7. Enter your Password Reset Question and Answer. This can be used at a later time if you forget your password. 8. Enter your e-mail address. You will receive e-mail notification when new information is available in [address] 9. Click Sign Up. You can now view and download portions of your medical record. 10. Click the Download Summary menu link to download a portable copy of your medical information. If you have questions, please visit the Frequently Asked Questions section of the [employer] website. Remember, [employer] is NOT to be used for urgent needs. For medical emergencies, dial 911. Now available from your iPhone and Android! Please provide this summary of care documentation to your next provider. Your primary care clinician is listed as Umang Gay. If you have any questions after today's visit, please call [phone].

## 2017-09-15 NOTE — PROGRESS NOTES
HISTORY OF PRESENT ILLNESS  Tristan Brandt is a 80 y.o. female. HPI  Patient comes in today for increased agitation and anxiety in alzheimers. Patient is here with daughter. Patient's daughter states anxiety and agitation has been bad, occurs daily. Patient will start crying and screaming. States mood changes occur very rapidly. Last night, sitting on deck and mosquitos started biting. States she started screaming, ripping clothes off, states her skin was burning. States it took her hours to get her under control. Gets ideas in her head, such as she thinks she is pregnant at time. Anxiety riding in car. States Dr. Candie Hummel was supposed to schedule patient with neuropsychiatry. Has been having a lot of burning sensation in feet. States patient complains of that daily. Daughter states she has to believe that patient has the pain because the complaint is daily. No Known Allergies    Past Medical History:   Diagnosis Date    Anemia     Anxiety     DDD (degenerative disc disease), cervical     Depression     Esophagitis     FH: breast cancer     FH: hypertension     Hypercholesterolemia     Hypertension     MR (mitral regurgitation)     MVP (mitral valve prolapse)     Stool color black        Past Surgical History:   Procedure Laterality Date    ABDOMEN SURGERY PROC UNLISTED  2015     and    2016    Bowel Blockage    DILATE ESOPHAGUS  6/3/2011         ENDOSCOPY, COLON, DIAGNOSTIC      due 2016    HX ABDOMINAL LAPAROSCOPY  10/2015    lap enterolysis for obstructing solitary adhesive band    HX CATARACT REMOVAL      HX GI      esoph dilatation    HX GI  08/27/2016    LAPAROTOMY EXPLORATORY, Ileocecectomy.     HX HYSTERECTOMY      HX ORTHOPAEDIC      R shoulder replacement    AR EGD TRANSORAL BIOPSY SINGLE/MULTIPLE  6/3/2011            Social History     Social History    Marital status:      Spouse name: N/A    Number of children: N/A    Years of education: N/A     Occupational History    Not on file. Social History Main Topics    Smoking status: Former Smoker     Quit date: 8/5/1960    Smokeless tobacco: Never Used    Alcohol use No    Drug use: No    Sexual activity: Not Currently     Other Topics Concern    Not on file     Social History Narrative       Family History   Problem Relation Age of Onset    Cancer Mother      breast    Hypertension Mother     Diabetes Mother     Cancer Father      head and neck    Hypertension Father     Cancer Brother     Cancer Maternal Aunt        Current Outpatient Prescriptions   Medication Sig    ALPRAZolam (XANAX) 0.25 mg tablet TAKE ONE TABLET BY MOUTH TWICE DAILY AS NEEDED FOR ANXIETY. MAX OF 2 TABLETS DAILY    losartan-hydroCHLOROthiazide (HYZAAR) 100-25 mg per tablet TAKE ONE TABLET BY MOUTH ONE TIME DAILY    sertraline (ZOLOFT) 50 mg tablet 1 po bid    metoprolol succinate (TOPROL-XL) 25 mg XL tablet Take 1 Tab by mouth daily.  ACETAMINOPHEN/DIPHENHYDRAMINE (TYLENOL PM PO) Take  by mouth.  aspirin 81 mg chewable tablet Take 81 mg by mouth daily. No current facility-administered medications for this visit. Review of Systems   Neurological:        Burning sensation in feet   Psychiatric/Behavioral: The patient is nervous/anxious and has insomnia. Hx alzheimers, increased anxiety and agitation. Vitals:    09/15/17 0938   BP: 150/57   Pulse: (!) 53   Resp: 20   Temp: 97.4 °F (36.3 °C)   TempSrc: Oral   SpO2: 98%   Weight: 105 lb (47.6 kg)   Height: 5' 5\" (1.651 m)     Physical Exam   Constitutional: She is oriented to person, place, and time. She appears well-developed and well-nourished. Pulmonary/Chest: Effort normal.   Neurological: She is alert and oriented to person, place, and time. Psychiatric: Her speech is normal and behavior is normal. Thought content normal. Her mood appears anxious. Cognition and memory are impaired. She exhibits abnormal recent memory and abnormal remote memory.    MMSE 20/30. Difficulty with orientation to time (only oriented to season and month), difficulty with recall, complex commands. Scored well on orientation to place (4/5), registration (3/3), attention and calculation (5/5 for WORLD spelled backwards), language and repetition. Vitals reviewed. ASSESSMENT and PLAN    ICD-10-CM ICD-9-CM    1. Alzheimer's dementia with behavioral disturbance, unspecified timing of dementia onset G30.8 331.0 donepezil (ARICEPT) 5 mg tablet    F02.81 294.11 REFERRAL TO NEUROPSYCHOLOGY   2. Anxiety F41.9 300.00    3. Burning sensation in lower extremity R20.8 782.0 gabapentin (NEURONTIN) 100 mg capsule   4. Encounter for immunization Z23 V03.89 INFLUENZA VIRUS VACCINE, HIGH DOSE SEASONAL, PRESERVATIVE FREE     Encounter Diagnoses   Name Primary?  Alzheimer's dementia with behavioral disturbance, unspecified timing of dementia onset Yes    Anxiety     Burning sensation in lower extremity     Encounter for immunization      Orders Placed This Encounter    Influenza virus vaccine (FLUZONE HIGH-DOSE) 65 years and older    REFERRAL TO NEUROPSYCHOLOGY    donepezil (ARICEPT) 5 mg tablet    gabapentin (NEURONTIN) 100 mg capsule     Diagnoses and all orders for this visit:    1. Alzheimer's dementia with behavioral disturbance, unspecified timing of dementia onset - MMSE 20/30. Difficulty with orientation to time (only oriented to season and month), difficulty with recall, complex commands. Scored well on orientation to place (4/5), registration (3/3), attention and calculation (5/5 for WORLD spelled backwards), language and repetition. Will start Aricept and refer to neuropsych  -     donepezil (ARICEPT) 5 mg tablet; Take 1 Tab by mouth nightly.  -     REFERRAL TO NEUROPSYCHOLOGY    2. Anxiety - discussed with daughter that sometimes benzodiazepines cause increased agitation. Do not want to increase dose. Patient is already taking zoloft.   Will not make any other medication adjustments other than addition of Aricept and gabapentin for now. Refer to neuropsych. 3. Burning sensation in lower extremity  -     gabapentin (NEURONTIN) 100 mg capsule; Take 1 Cap by mouth nightly. Can increase to BID after 1 week (instructed daughter to not give to patient until she takes Aricept for 1 week - did not want to start multiple medications with patient at once)    4. Encounter for immunization  -     Influenza virus vaccine ([place]) 65 years and older      Follow-up Disposition:  Return in about 6 weeks (around 10/27/2017), or if symptoms worsen or fail to improve. I have reviewed the patient's allergies and made any necessary changes. Medical, procedural, social and family histories have been reviewed and updated as medically indicated. I have reconciled and/or revised patient medications in the EMR. I have discussed each diagnosis listed in this note with Leslee Hilario and/or their family. I have discussed treatment options and the risk/benefit analysis of those options, including safe use of medications and possible medication side effects. Through the use of shared decision making we have agreed to the above plan. The patient has received an after-visit summary and questions were answered concerning future plans. Ingrid Milton, ALEJA-C    This note will not be viewable in [employer].

## 2017-11-01 NOTE — PROGRESS NOTES
HISTORY OF PRESENT ILLNESS  Ursula Amaya is a 80 y.o. female. HPI  Patient comes in today for burning sensation. Patient is here with daughter. Patient's daughter states anxiety and agitation has been bad, occurs daily. Stopped taking Aricept, states had hard time to get patient refocused, psychosis seemed worse on Aricept. States mood changes occur very rapidly. Gets ideas in her head, such as she thinks she is pregnant at time. Anxiety riding in car. Has been having a lot of burning sensation - wakes up every morning and states her \"body is burning all over\" - .  States patient complains of that daily. Daughter states she has to believe that patient has the pain because the complaint is daily. No Known Allergies    Past Medical History:   Diagnosis Date    Anemia     Anxiety     DDD (degenerative disc disease), cervical     Depression     Esophagitis     FH: breast cancer     FH: hypertension     Hypercholesterolemia     Hypertension     MR (mitral regurgitation)     MVP (mitral valve prolapse)     Stool color black        Past Surgical History:   Procedure Laterality Date    ABDOMEN SURGERY PROC UNLISTED  2015     and    2016    Bowel Blockage    DILATE ESOPHAGUS  6/3/2011         ENDOSCOPY, COLON, DIAGNOSTIC      due 2016    HX ABDOMINAL LAPAROSCOPY  10/2015    lap enterolysis for obstructing solitary adhesive band    HX CATARACT REMOVAL      HX GI      esoph dilatation    HX GI  08/27/2016    LAPAROTOMY EXPLORATORY, Ileocecectomy.  HX HYSTERECTOMY      HX ORTHOPAEDIC      R shoulder replacement    [place] EGD TRANSORAL BIOPSY SINGLE/MULTIPLE  6/3/2011            Social History     Social History    Marital status:      Spouse name: N/A    Number of children: N/A    Years of education: N/A     Occupational History    Not on file.      Social History Main Topics    Smoking status: Former Smoker     Quit date: 8/5/1960    Smokeless tobacco: Never Used    Alcohol use No  Drug use: No    Sexual activity: Not Currently     Other Topics Concern    Not on file     Social History Narrative       Family History   Problem Relation Age of Onset    Cancer Mother      breast    Hypertension Mother     Diabetes Mother     Cancer Father      head and neck    Hypertension Father     Cancer Brother     Cancer Maternal Aunt        Current Outpatient Prescriptions   Medication Sig    gabapentin (NEURONTIN) 100 mg capsule Take 1 Cap by mouth nightly. Can increase to BID after 1 week    ALPRAZolam (XANAX) 0.25 mg tablet TAKE ONE TABLET BY MOUTH TWICE DAILY AS NEEDED FOR ANXIETY. MAX OF 2 TABLETS DAILY    losartan-hydroCHLOROthiazide (HYZAAR) 100-25 mg per tablet TAKE ONE TABLET BY MOUTH ONE TIME DAILY    sertraline (ZOLOFT) 50 mg tablet 1 po bid    metoprolol succinate (TOPROL-XL) 25 mg XL tablet Take 1 Tab by mouth daily.  ACETAMINOPHEN/DIPHENHYDRAMINE (TYLENOL PM PO) Take  by mouth.  aspirin 81 mg chewable tablet Take 81 mg by mouth daily.  donepezil (ARICEPT) 5 mg tablet Take 1 Tab by mouth nightly. No current facility-administered medications for this visit. Review of Systems   Constitutional: Negative. Respiratory: Negative. Cardiovascular: Negative. Gastrointestinal: Negative. Genitourinary: Negative. Skin: Negative. Neurological:        Burning sensation in feet   Psychiatric/Behavioral: The patient is nervous/anxious and has insomnia. Hx alzheimers, increased anxiety and agitation. Vitals:    11/01/17 1250 11/01/17 1300   BP: 150/43 151/53   Pulse: 60 62   Resp: 20    Temp: 98.2 °F (36.8 °C)    TempSrc: Oral    SpO2: 95%    Weight: 109 lb (49.4 kg)    Height: 5' 5\" (1.651 m)      Physical Exam   Constitutional: She is oriented to person, place, and time. She appears well-developed and well-nourished. Cardiovascular: Normal rate, regular rhythm and normal heart sounds.     Pulses:       Dorsalis pedis pulses are 2+ on the right side, and 2+ on the left side. No edema noted   Pulmonary/Chest: Effort normal and breath sounds normal.   Neurological: She is alert and oriented to person, place, and time. Psychiatric: Her speech is normal and behavior is normal. Thought content normal. Her mood appears anxious. Cognition and memory are impaired. She exhibits abnormal recent memory and abnormal remote memory. Vitals reviewed. ASSESSMENT and PLAN    ICD-10-CM ICD-9-CM    1. Burning sensation in lower extremity R20.8 782.0 CBC WITH AUTOMATED DIFF      IRON PROFILE      METABOLIC PANEL, COMPREHENSIVE      VITAMIN B12 & FOLATE      TSH RFX ON ABNORMAL TO FREE T4      gabapentin (NEURONTIN) 300 mg capsule   2. Alzheimer's dementia with behavioral disturbance, unspecified timing of dementia onset G30.8 331.0     F02.81 294.11    3. Depression, unspecified depression type F32.9 311 ALPRAZolam (XANAX) 0.25 mg tablet     Encounter Diagnoses   Name Primary?  Burning sensation in lower extremity Yes    Alzheimer's dementia with behavioral disturbance, unspecified timing of dementia onset     Depression, unspecified depression type      Orders Placed This Encounter    CBC WITH AUTOMATED DIFF    IRON PROFILE    METABOLIC PANEL, COMPREHENSIVE    VITAMIN B12 & FOLATE    TSH RFX ON ABNORMAL TO FREE T4    ALPRAZolam (XANAX) 0.25 mg tablet    gabapentin (NEURONTIN) 300 mg capsule     Diagnoses and all orders for this visit:    1. Burning sensation in lower extremity - will check labs, increase gabapentin to 300mg  -     CBC WITH AUTOMATED DIFF  -     IRON PROFILE  -     METABOLIC PANEL, COMPREHENSIVE  -     VITAMIN B12 & FOLATE  -     TSH RFX ON ABNORMAL TO FREE T4  -     gabapentin (NEURONTIN) 300 mg capsule; Take 1 Cap by mouth two (2) times a day. Can increase to BID after 1 week    2. Alzheimer's dementia with behavioral disturbance, unspecified timing of dementia onset - has appt with neuropsych on 11/29/17    3.  Depression, unspecified depression type  -     ALPRAZolam (XANAX) 0.25 mg tablet; TAKE ONE TABLET BY MOUTH TWICE DAILY AS NEEDED FOR ANXIETY. MAX OF 2 TABLETS DAILY  Indications: anxiety      Follow-up Disposition:  Return in about 6 months (around 5/1/2018). I have reviewed the patient's allergies and made any necessary changes. Medical, procedural, social and family histories have been reviewed and updated as medically indicated. I have reconciled and/or revised patient medications in the EMR. I have discussed each diagnosis listed in this note with Jose Marrufo and/or their family. I have discussed treatment options and the risk/benefit analysis of those options, including safe use of medications and possible medication side effects. Through the use of shared decision making we have agreed to the above plan. The patient has received an after-visit summary and questions were answered concerning future plans. Ingrid Milton, ALEJA-C    This note will not be viewable in [employer].

## 2017-11-01 NOTE — PROGRESS NOTES
Chief Complaint   Patient presents with    Medication Evaluation     Patient is here today for medication evaluation. C/O body burning when she gets up in the morning. Patient is given xanax to calm her.

## 2018-01-01 ENCOUNTER — HOSPICE ADMISSION (OUTPATIENT)
Dept: HOSPICE | Facility: HOSPICE | Age: 83
End: 2018-01-01

## 2018-01-01 ENCOUNTER — HOSPITAL ENCOUNTER (OUTPATIENT)
Dept: LAB | Age: 83
Discharge: HOME | End: 2018-03-26
Payer: MEDICARE

## 2018-01-01 ENCOUNTER — HOME CARE VISIT (OUTPATIENT)
Dept: SCHEDULING | Facility: HOME HEALTH | Age: 83
End: 2018-01-01
Payer: MEDICARE

## 2018-01-01 ENCOUNTER — APPOINTMENT (OUTPATIENT)
Dept: GENERAL RADIOLOGY | Age: 83
DRG: 871 | End: 2018-01-01
Attending: EMERGENCY MEDICINE
Payer: MEDICARE

## 2018-01-01 ENCOUNTER — PATIENT OUTREACH (OUTPATIENT)
Dept: FAMILY MEDICINE CLINIC | Age: 83
End: 2018-01-01

## 2018-01-01 ENCOUNTER — HOSPITAL ENCOUNTER (INPATIENT)
Age: 83
LOS: 2 days | Discharge: HOSPICE | DRG: 872 | End: 2018-03-28
Attending: EMERGENCY MEDICINE | Admitting: INTERNAL MEDICINE
Payer: MEDICARE

## 2018-01-01 ENCOUNTER — HOME CARE VISIT (OUTPATIENT)
Dept: SCHEDULING | Facility: HOME HEALTH | Age: 83
End: 2018-01-01

## 2018-01-01 ENCOUNTER — APPOINTMENT (OUTPATIENT)
Dept: GENERAL RADIOLOGY | Age: 83
DRG: 470 | End: 2018-01-01
Attending: PHYSICIAN ASSISTANT
Payer: MEDICARE

## 2018-01-01 ENCOUNTER — HOSPITAL ENCOUNTER (OUTPATIENT)
Dept: ULTRASOUND IMAGING | Age: 83
Discharge: HOME | End: 2018-03-20
Attending: NURSE PRACTITIONER
Payer: MEDICARE

## 2018-01-01 ENCOUNTER — TELEPHONE (OUTPATIENT)
Dept: FAMILY MEDICINE CLINIC | Age: 83
End: 2018-01-01

## 2018-01-01 ENCOUNTER — APPOINTMENT (OUTPATIENT)
Dept: GENERAL RADIOLOGY | Age: 83
DRG: 470 | End: 2018-01-01
Attending: EMERGENCY MEDICINE
Payer: MEDICARE

## 2018-01-01 ENCOUNTER — ANESTHESIA (OUTPATIENT)
Dept: SURGERY | Age: 83
DRG: 470 | End: 2018-01-01
Payer: MEDICARE

## 2018-01-01 ENCOUNTER — OFFICE VISIT (OUTPATIENT)
Dept: FAMILY MEDICINE CLINIC | Age: 83
End: 2018-01-01

## 2018-01-01 ENCOUNTER — APPOINTMENT (OUTPATIENT)
Dept: CT IMAGING | Age: 83
DRG: 871 | End: 2018-01-01
Attending: EMERGENCY MEDICINE
Payer: MEDICARE

## 2018-01-01 ENCOUNTER — HOME CARE VISIT (OUTPATIENT)
Dept: HOSPICE | Facility: HOSPICE | Age: 83
End: 2018-01-01
Payer: MEDICARE

## 2018-01-01 ENCOUNTER — HOSPITAL ENCOUNTER (EMERGENCY)
Age: 83
Discharge: HOME | End: 2018-03-20
Attending: EMERGENCY MEDICINE
Payer: MEDICARE

## 2018-01-01 ENCOUNTER — HOSPITAL ENCOUNTER (OUTPATIENT)
Dept: LAB | Age: 83
Discharge: HOME | End: 2018-02-27
Payer: MEDICARE

## 2018-01-01 ENCOUNTER — HOSPITAL ENCOUNTER (INPATIENT)
Age: 83
LOS: 4 days | Discharge: HOSPICE | DRG: 470 | End: 2018-04-03
Attending: EMERGENCY MEDICINE | Admitting: INTERNAL MEDICINE
Payer: MEDICARE

## 2018-01-01 ENCOUNTER — APPOINTMENT (OUTPATIENT)
Dept: CT IMAGING | Age: 83
DRG: 470 | End: 2018-01-01
Attending: EMERGENCY MEDICINE
Payer: MEDICARE

## 2018-01-01 ENCOUNTER — HOSPITAL ENCOUNTER (INPATIENT)
Age: 83
LOS: 4 days | Discharge: HOME | DRG: 871 | End: 2018-02-17
Attending: EMERGENCY MEDICINE | Admitting: INTERNAL MEDICINE
Payer: MEDICARE

## 2018-01-01 ENCOUNTER — ANESTHESIA EVENT (OUTPATIENT)
Dept: SURGERY | Age: 83
DRG: 470 | End: 2018-01-01
Payer: MEDICARE

## 2018-01-01 ENCOUNTER — HOSPICE ADMISSION (OUTPATIENT)
Dept: HOSPICE | Facility: HOSPICE | Age: 83
End: 2018-01-01
Payer: MEDICARE

## 2018-01-01 ENCOUNTER — HOSPICE CERTIFICATION ENCOUNTER (OUTPATIENT)
Dept: HOSPICE | Facility: HOSPICE | Age: 83
End: 2018-01-01

## 2018-01-01 ENCOUNTER — HOME CARE VISIT (OUTPATIENT)
Dept: HOSPICE | Facility: HOSPICE | Age: 83
End: 2018-01-01

## 2018-01-01 ENCOUNTER — APPOINTMENT (OUTPATIENT)
Dept: GENERAL RADIOLOGY | Age: 83
DRG: 872 | End: 2018-01-01
Attending: EMERGENCY MEDICINE
Payer: MEDICARE

## 2018-01-01 VITALS
HEART RATE: 63 BPM | WEIGHT: 107.6 LBS | HEIGHT: 65 IN | BODY MASS INDEX: 17.93 KG/M2 | DIASTOLIC BLOOD PRESSURE: 77 MMHG | SYSTOLIC BLOOD PRESSURE: 127 MMHG | TEMPERATURE: 96.8 F | RESPIRATION RATE: 16 BRPM | OXYGEN SATURATION: 94 %

## 2018-01-01 VITALS
DIASTOLIC BLOOD PRESSURE: 44 MMHG | OXYGEN SATURATION: 95 % | SYSTOLIC BLOOD PRESSURE: 84 MMHG | WEIGHT: 116.2 LBS | TEMPERATURE: 98.2 F | BODY MASS INDEX: 19.36 KG/M2 | HEIGHT: 65 IN | HEART RATE: 78 BPM | RESPIRATION RATE: 16 BRPM

## 2018-01-01 VITALS
HEIGHT: 65 IN | SYSTOLIC BLOOD PRESSURE: 116 MMHG | DIASTOLIC BLOOD PRESSURE: 66 MMHG | TEMPERATURE: 98 F | RESPIRATION RATE: 18 BRPM | HEART RATE: 107 BPM | OXYGEN SATURATION: 98 % | BODY MASS INDEX: 17.63 KG/M2 | WEIGHT: 105.82 LBS

## 2018-01-01 VITALS
SYSTOLIC BLOOD PRESSURE: 137 MMHG | WEIGHT: 107.2 LBS | RESPIRATION RATE: 16 BRPM | DIASTOLIC BLOOD PRESSURE: 45 MMHG | HEART RATE: 85 BPM | TEMPERATURE: 96.1 F | HEIGHT: 65 IN | OXYGEN SATURATION: 95 % | BODY MASS INDEX: 17.86 KG/M2

## 2018-01-01 VITALS
OXYGEN SATURATION: 96 % | RESPIRATION RATE: 18 BRPM | DIASTOLIC BLOOD PRESSURE: 103 MMHG | SYSTOLIC BLOOD PRESSURE: 119 MMHG | TEMPERATURE: 98.4 F | HEART RATE: 60 BPM | BODY MASS INDEX: 20.35 KG/M2 | HEIGHT: 65 IN | WEIGHT: 122.14 LBS

## 2018-01-01 VITALS
TEMPERATURE: 97.1 F | WEIGHT: 107 LBS | RESPIRATION RATE: 16 BRPM | BODY MASS INDEX: 17.83 KG/M2 | OXYGEN SATURATION: 100 % | DIASTOLIC BLOOD PRESSURE: 79 MMHG | SYSTOLIC BLOOD PRESSURE: 126 MMHG | HEIGHT: 65 IN | HEART RATE: 109 BPM

## 2018-01-01 VITALS
SYSTOLIC BLOOD PRESSURE: 111 MMHG | HEIGHT: 65 IN | HEART RATE: 61 BPM | DIASTOLIC BLOOD PRESSURE: 40 MMHG | TEMPERATURE: 95.5 F | RESPIRATION RATE: 16 BRPM | BODY MASS INDEX: 18.59 KG/M2 | WEIGHT: 111.6 LBS | OXYGEN SATURATION: 94 %

## 2018-01-01 VITALS
WEIGHT: 128.09 LBS | SYSTOLIC BLOOD PRESSURE: 165 MMHG | RESPIRATION RATE: 18 BRPM | TEMPERATURE: 97.6 F | OXYGEN SATURATION: 95 % | HEIGHT: 64 IN | BODY MASS INDEX: 21.87 KG/M2 | DIASTOLIC BLOOD PRESSURE: 84 MMHG | HEART RATE: 103 BPM

## 2018-01-01 VITALS — HEART RATE: 120 BPM | SYSTOLIC BLOOD PRESSURE: 156 MMHG | DIASTOLIC BLOOD PRESSURE: 78 MMHG | RESPIRATION RATE: 20 BRPM

## 2018-01-01 VITALS — TEMPERATURE: 100 F

## 2018-01-01 DIAGNOSIS — I10 ESSENTIAL HYPERTENSION WITH GOAL BLOOD PRESSURE LESS THAN 140/90: ICD-10-CM

## 2018-01-01 DIAGNOSIS — F02.80 LEWY BODY DEMENTIA WITHOUT BEHAVIORAL DISTURBANCE (HCC): ICD-10-CM

## 2018-01-01 DIAGNOSIS — R53.81 PHYSICAL DECONDITIONING: ICD-10-CM

## 2018-01-01 DIAGNOSIS — F03.91 DEMENTIA WITH BEHAVIORAL DISTURBANCE, UNSPECIFIED DEMENTIA TYPE: ICD-10-CM

## 2018-01-01 DIAGNOSIS — S72.001A CLOSED FRACTURE OF NECK OF RIGHT FEMUR, INITIAL ENCOUNTER (HCC): Primary | ICD-10-CM

## 2018-01-01 DIAGNOSIS — R35.0 URINARY FREQUENCY: Primary | ICD-10-CM

## 2018-01-01 DIAGNOSIS — D64.9 CHRONIC ANEMIA: ICD-10-CM

## 2018-01-01 DIAGNOSIS — N30.01 ACUTE CYSTITIS WITH HEMATURIA: Primary | ICD-10-CM

## 2018-01-01 DIAGNOSIS — D64.9 ANEMIA, UNSPECIFIED TYPE: ICD-10-CM

## 2018-01-01 DIAGNOSIS — R41.82 ALTERED MENTAL STATUS, UNSPECIFIED ALTERED MENTAL STATUS TYPE: ICD-10-CM

## 2018-01-01 DIAGNOSIS — R00.0 SINUS TACHYCARDIA: ICD-10-CM

## 2018-01-01 DIAGNOSIS — A41.9 SEPSIS, DUE TO UNSPECIFIED ORGANISM: Primary | ICD-10-CM

## 2018-01-01 DIAGNOSIS — E87.6 HYPOKALEMIA: ICD-10-CM

## 2018-01-01 DIAGNOSIS — N30.01 ACUTE CYSTITIS WITH HEMATURIA: ICD-10-CM

## 2018-01-01 DIAGNOSIS — I82.4Y2 ACUTE DEEP VEIN THROMBOSIS (DVT) OF PROXIMAL VEIN OF LEFT LOWER EXTREMITY (HCC): Primary | ICD-10-CM

## 2018-01-01 DIAGNOSIS — G31.83 LEWY BODY DEMENTIA WITHOUT BEHAVIORAL DISTURBANCE (HCC): ICD-10-CM

## 2018-01-01 DIAGNOSIS — R41.0 DELIRIUM: ICD-10-CM

## 2018-01-01 DIAGNOSIS — M79.89 LEFT LEG SWELLING: ICD-10-CM

## 2018-01-01 DIAGNOSIS — N39.0 URINARY TRACT INFECTION WITHOUT HEMATURIA, SITE UNSPECIFIED: ICD-10-CM

## 2018-01-01 DIAGNOSIS — I82.412 ACUTE DEEP VEIN THROMBOSIS (DVT) OF FEMORAL VEIN OF LEFT LOWER EXTREMITY (HCC): Primary | ICD-10-CM

## 2018-01-01 DIAGNOSIS — I95.9 HYPOTENSION, UNSPECIFIED HYPOTENSION TYPE: ICD-10-CM

## 2018-01-01 DIAGNOSIS — Z09 HOSPITAL DISCHARGE FOLLOW-UP: Primary | ICD-10-CM

## 2018-01-01 DIAGNOSIS — M79.89 LEFT LEG SWELLING: Primary | ICD-10-CM

## 2018-01-01 DIAGNOSIS — D72.819 LEUKOPENIA, UNSPECIFIED TYPE: ICD-10-CM

## 2018-01-01 LAB
ABO + RH BLD: NORMAL
ABO + RH BLD: NORMAL
ALBUMIN SERPL-MCNC: 3 G/DL (ref 3.5–5)
ALBUMIN SERPL-MCNC: 3.3 G/DL (ref 3.5–5)
ALBUMIN SERPL-MCNC: 3.6 G/DL (ref 3.5–4.7)
ALBUMIN SERPL-MCNC: 3.6 G/DL (ref 3.5–5)
ALBUMIN/GLOB SERPL: 0.7 {RATIO} (ref 1.1–2.2)
ALBUMIN/GLOB SERPL: 0.8 {RATIO} (ref 1.1–2.2)
ALBUMIN/GLOB SERPL: 0.9 {RATIO} (ref 1.1–2.2)
ALBUMIN/GLOB SERPL: 1.2 {RATIO} (ref 1.2–2.2)
ALP SERPL-CCNC: 105 U/L (ref 45–117)
ALP SERPL-CCNC: 86 IU/L (ref 39–117)
ALP SERPL-CCNC: 90 U/L (ref 45–117)
ALP SERPL-CCNC: 96 U/L (ref 45–117)
ALT SERPL-CCNC: 13 IU/L (ref 0–32)
ALT SERPL-CCNC: 13 U/L (ref 12–78)
ALT SERPL-CCNC: 18 U/L (ref 12–78)
ALT SERPL-CCNC: 23 U/L (ref 12–78)
ANION GAP SERPL CALC-SCNC: 3 MMOL/L (ref 5–15)
ANION GAP SERPL CALC-SCNC: 6 MMOL/L (ref 5–15)
ANION GAP SERPL CALC-SCNC: 6 MMOL/L (ref 5–15)
ANION GAP SERPL CALC-SCNC: 7 MMOL/L (ref 5–15)
ANION GAP SERPL CALC-SCNC: 7 MMOL/L (ref 5–15)
ANION GAP SERPL CALC-SCNC: 9 MMOL/L (ref 5–15)
APPEARANCE UR: ABNORMAL
APPEARANCE UR: ABNORMAL
APPEARANCE UR: CLEAR
APTT PPP: 27.3 SEC (ref 22.1–32)
AST SERPL-CCNC: 14 IU/L (ref 0–40)
AST SERPL-CCNC: 24 U/L (ref 15–37)
AST SERPL-CCNC: 26 U/L (ref 15–37)
AST SERPL-CCNC: 39 U/L (ref 15–37)
ATRIAL RATE: 70 BPM
ATRIAL RATE: 73 BPM
ATRIAL RATE: 90 BPM
BACTERIA SPEC CULT: ABNORMAL
BACTERIA SPEC CULT: NORMAL
BACTERIA SPEC CULT: NORMAL
BACTERIA UA POCT, BACTPOCT: NORMAL
BACTERIA UA POCT, BACTPOCT: NORMAL
BACTERIA URNS QL MICRO: ABNORMAL /HPF
BACTERIA URNS QL MICRO: ABNORMAL /HPF
BACTERIA URNS QL MICRO: NEGATIVE /HPF
BASOPHILS # BLD: 0 K/UL (ref 0–0.1)
BASOPHILS NFR BLD: 0 % (ref 0–1)
BASOPHILS NFR BLD: 1 % (ref 0–1)
BASOPHILS NFR BLD: 2 % (ref 0–1)
BASOPHILS NFR BLD: 2 % (ref 0–1)
BILIRUB SERPL-MCNC: 0.4 MG/DL (ref 0.2–1)
BILIRUB SERPL-MCNC: 0.4 MG/DL (ref 0–1.2)
BILIRUB SERPL-MCNC: 0.6 MG/DL (ref 0.2–1)
BILIRUB SERPL-MCNC: 0.7 MG/DL (ref 0.2–1)
BILIRUB UR QL STRIP: NEGATIVE
BILIRUB UR QL STRIP: NORMAL
BILIRUB UR QL: NEGATIVE
BLD PROD TYP BPU: NORMAL
BLD PROD TYP BPU: NORMAL
BLOOD GROUP ANTIBODIES SERPL: NORMAL
BLOOD GROUP ANTIBODIES SERPL: NORMAL
BPU ID: NORMAL
BPU ID: NORMAL
BUN SERPL-MCNC: 11 MG/DL (ref 6–20)
BUN SERPL-MCNC: 11 MG/DL (ref 6–20)
BUN SERPL-MCNC: 14 MG/DL (ref 6–20)
BUN SERPL-MCNC: 18 MG/DL (ref 6–20)
BUN SERPL-MCNC: 19 MG/DL (ref 6–20)
BUN SERPL-MCNC: 19 MG/DL (ref 8–27)
BUN SERPL-MCNC: 24 MG/DL (ref 6–20)
BUN SERPL-MCNC: 24 MG/DL (ref 6–20)
BUN SERPL-MCNC: 26 MG/DL (ref 6–20)
BUN SERPL-MCNC: 30 MG/DL (ref 6–20)
BUN/CREAT SERPL: 15 (ref 12–20)
BUN/CREAT SERPL: 15 (ref 12–20)
BUN/CREAT SERPL: 16 (ref 12–20)
BUN/CREAT SERPL: 17 (ref 12–20)
BUN/CREAT SERPL: 17 (ref 12–28)
BUN/CREAT SERPL: 18 (ref 12–20)
BUN/CREAT SERPL: 18 (ref 12–20)
BUN/CREAT SERPL: 22 (ref 12–20)
BUN/CREAT SERPL: 22 (ref 12–20)
BUN/CREAT SERPL: 27 (ref 12–20)
CALCIUM SERPL-MCNC: 7.5 MG/DL (ref 8.5–10.1)
CALCIUM SERPL-MCNC: 7.7 MG/DL (ref 8.5–10.1)
CALCIUM SERPL-MCNC: 7.8 MG/DL (ref 8.5–10.1)
CALCIUM SERPL-MCNC: 7.8 MG/DL (ref 8.5–10.1)
CALCIUM SERPL-MCNC: 8 MG/DL (ref 8.5–10.1)
CALCIUM SERPL-MCNC: 8.1 MG/DL (ref 8.5–10.1)
CALCIUM SERPL-MCNC: 8.2 MG/DL (ref 8.7–10.3)
CALCIUM SERPL-MCNC: 8.4 MG/DL (ref 8.5–10.1)
CALCIUM SERPL-MCNC: 8.4 MG/DL (ref 8.5–10.1)
CALCIUM SERPL-MCNC: 8.5 MG/DL (ref 8.5–10.1)
CALCULATED P AXIS, ECG09: 111 DEGREES
CALCULATED P AXIS, ECG09: 78 DEGREES
CALCULATED P AXIS, ECG09: 85 DEGREES
CALCULATED R AXIS, ECG10: 50 DEGREES
CALCULATED R AXIS, ECG10: 64 DEGREES
CALCULATED R AXIS, ECG10: 66 DEGREES
CALCULATED T AXIS, ECG11: 58 DEGREES
CALCULATED T AXIS, ECG11: 62 DEGREES
CALCULATED T AXIS, ECG11: 67 DEGREES
CASTS UA POCT: NEGATIVE
CASTS UA POCT: NEGATIVE
CC UR VC: ABNORMAL
CC UR VC: ABNORMAL
CHLORIDE SERPL-SCNC: 103 MMOL/L (ref 97–108)
CHLORIDE SERPL-SCNC: 106 MMOL/L (ref 97–108)
CHLORIDE SERPL-SCNC: 107 MMOL/L (ref 97–108)
CHLORIDE SERPL-SCNC: 107 MMOL/L (ref 97–108)
CHLORIDE SERPL-SCNC: 108 MMOL/L (ref 97–108)
CHLORIDE SERPL-SCNC: 108 MMOL/L (ref 97–108)
CHLORIDE SERPL-SCNC: 110 MMOL/L (ref 97–108)
CHLORIDE SERPL-SCNC: 98 MMOL/L (ref 96–106)
CK SERPL-CCNC: 55 U/L (ref 26–192)
CK SERPL-CCNC: 798 U/L (ref 26–192)
CK SERPL-CCNC: 812 U/L (ref 26–192)
CLUE CELLS, CLUEPOCT: NEGATIVE
CLUE CELLS, CLUEPOCT: NEGATIVE
CO2 SERPL-SCNC: 23 MMOL/L (ref 21–32)
CO2 SERPL-SCNC: 24 MMOL/L (ref 21–32)
CO2 SERPL-SCNC: 25 MMOL/L (ref 18–29)
CO2 SERPL-SCNC: 25 MMOL/L (ref 21–32)
CO2 SERPL-SCNC: 26 MMOL/L (ref 21–32)
CO2 SERPL-SCNC: 33 MMOL/L (ref 21–32)
COLOR UR: ABNORMAL
CREAT SERPL-MCNC: 0.62 MG/DL (ref 0.55–1.02)
CREAT SERPL-MCNC: 0.75 MG/DL (ref 0.55–1.02)
CREAT SERPL-MCNC: 0.89 MG/DL (ref 0.55–1.02)
CREAT SERPL-MCNC: 1.07 MG/DL (ref 0.55–1.02)
CREAT SERPL-MCNC: 1.07 MG/DL (ref 0.55–1.02)
CREAT SERPL-MCNC: 1.1 MG/DL (ref 0.57–1)
CREAT SERPL-MCNC: 1.12 MG/DL (ref 0.55–1.02)
CREAT SERPL-MCNC: 1.2 MG/DL (ref 0.55–1.02)
CREAT SERPL-MCNC: 1.23 MG/DL (ref 0.55–1.02)
CREAT SERPL-MCNC: 1.36 MG/DL (ref 0.55–1.02)
CROSSMATCH RESULT,%XM: NORMAL
CROSSMATCH RESULT,%XM: NORMAL
CRYSTALS UA POCT, CRYSPOCT: NEGATIVE
CRYSTALS UA POCT, CRYSPOCT: NEGATIVE
DIAGNOSIS, 93000: NORMAL
DIFFERENTIAL METHOD BLD: ABNORMAL
EOSINOPHIL # BLD: 0 K/UL (ref 0–0.4)
EOSINOPHIL NFR BLD: 0 % (ref 0–7)
EOSINOPHIL NFR BLD: 1 % (ref 0–7)
EOSINOPHIL NFR BLD: 1 % (ref 0–7)
EPITH CASTS URNS QL MICRO: ABNORMAL /LPF
EPITHELIAL CELLS POCT: NORMAL
EPITHELIAL CELLS POCT: NORMAL
ERYTHROCYTE [DISTWIDTH] IN BLOOD BY AUTOMATED COUNT: 17 % (ref 11.5–14.5)
ERYTHROCYTE [DISTWIDTH] IN BLOOD BY AUTOMATED COUNT: 17.1 % (ref 11.5–14.5)
ERYTHROCYTE [DISTWIDTH] IN BLOOD BY AUTOMATED COUNT: 18.1 % (ref 11.5–14.5)
ERYTHROCYTE [DISTWIDTH] IN BLOOD BY AUTOMATED COUNT: 18.6 % (ref 11.5–14.5)
ERYTHROCYTE [DISTWIDTH] IN BLOOD BY AUTOMATED COUNT: 18.6 % (ref 11.5–14.5)
ERYTHROCYTE [DISTWIDTH] IN BLOOD BY AUTOMATED COUNT: 19.7 % (ref 11.5–14.5)
FERRITIN SERPL-MCNC: 356 NG/ML (ref 15–150)
FOLATE SERPL-MCNC: 7.2 NG/ML
GFR SERPLBLD CREATININE-BSD FMLA CKD-EPI: 47 ML/MIN/1.73
GFR SERPLBLD CREATININE-BSD FMLA CKD-EPI: 54 ML/MIN/1.73
GLOBULIN SER CALC-MCNC: 3.1 G/DL (ref 1.5–4.5)
GLOBULIN SER CALC-MCNC: 3.9 G/DL (ref 2–4)
GLOBULIN SER CALC-MCNC: 4 G/DL (ref 2–4)
GLOBULIN SER CALC-MCNC: 4.2 G/DL (ref 2–4)
GLUCOSE BLD STRIP.AUTO-MCNC: 112 MG/DL (ref 65–100)
GLUCOSE SERPL-MCNC: 100 MG/DL (ref 65–99)
GLUCOSE SERPL-MCNC: 109 MG/DL (ref 65–100)
GLUCOSE SERPL-MCNC: 115 MG/DL (ref 65–100)
GLUCOSE SERPL-MCNC: 123 MG/DL (ref 65–100)
GLUCOSE SERPL-MCNC: 126 MG/DL (ref 65–100)
GLUCOSE SERPL-MCNC: 136 MG/DL (ref 65–100)
GLUCOSE SERPL-MCNC: 88 MG/DL (ref 65–100)
GLUCOSE SERPL-MCNC: 88 MG/DL (ref 65–100)
GLUCOSE SERPL-MCNC: 91 MG/DL (ref 65–100)
GLUCOSE SERPL-MCNC: 95 MG/DL (ref 65–100)
GLUCOSE UR STRIP.AUTO-MCNC: NEGATIVE MG/DL
GLUCOSE UR-MCNC: NEGATIVE MG/DL
GLUCOSE UR-MCNC: NEGATIVE MG/DL
GRAN# POC: ABNORMAL K/UL
GRAN# POC: NORMAL K/UL
GRAN# POC: NORMAL K/UL
GRAN% POC: ABNORMAL %
GRAN% POC: NORMAL %
GRAN% POC: NORMAL %
HCT VFR BLD AUTO: 18.4 % (ref 35–47)
HCT VFR BLD AUTO: 20.2 % (ref 35–47)
HCT VFR BLD AUTO: 20.9 % (ref 35–47)
HCT VFR BLD AUTO: 21.7 % (ref 35–47)
HCT VFR BLD AUTO: 23.2 % (ref 35–47)
HCT VFR BLD AUTO: 24.1 % (ref 35–47)
HCT VFR BLD AUTO: 25.1 % (ref 35–47)
HCT VFR BLD AUTO: 25.4 % (ref 35–47)
HCT VFR BLD CALC: ABNORMAL %
HCT VFR BLD CALC: NORMAL %
HCT VFR BLD CALC: NORMAL %
HEMOCCULT STL QL: NEGATIVE
HGB BLD-MCNC: 6 G/DL (ref 11.5–16)
HGB BLD-MCNC: 6.7 G/DL (ref 11.5–16)
HGB BLD-MCNC: 6.8 G/DL (ref 11.5–16)
HGB BLD-MCNC: 7.1 G/DL (ref 11.5–16)
HGB BLD-MCNC: 7.2 G/DL (ref 11.5–16)
HGB BLD-MCNC: 7.6 G/DL (ref 11.5–16)
HGB BLD-MCNC: 7.6 G/DL (ref 11.5–16)
HGB BLD-MCNC: 8.3 G/DL (ref 11.5–16)
HGB BLD-MCNC: 8.5 G/DL (ref 11.5–16)
HGB BLD-MCNC: ABNORMAL G/DL
HGB BLD-MCNC: NORMAL G/DL
HGB BLD-MCNC: NORMAL G/DL
HGB UR QL STRIP: ABNORMAL
HGB UR QL STRIP: ABNORMAL
HGB UR QL STRIP: NEGATIVE
HYALINE CASTS URNS QL MICRO: ABNORMAL /LPF (ref 0–5)
IMM GRANULOCYTES # BLD: 0 K/UL (ref 0–0.04)
IMM GRANULOCYTES NFR BLD AUTO: 0 % (ref 0–0.5)
IMM GRANULOCYTES NFR BLD AUTO: 1 % (ref 0–0.5)
INR PPP: 1.2 (ref 0.9–1.1)
INR PPP: 1.5 (ref 0.9–1.1)
INTERPRETATION: NORMAL
IRON SATN MFR SERPL: 7 % (ref 15–55)
IRON SERPL-MCNC: 13 UG/DL (ref 27–139)
KETONES P FAST UR STRIP-MCNC: NEGATIVE MG/DL
KETONES P FAST UR STRIP-MCNC: NORMAL MG/DL
KETONES UR QL STRIP.AUTO: NEGATIVE MG/DL
LACTATE SERPL-SCNC: 1.3 MMOL/L (ref 0.4–2)
LACTATE SERPL-SCNC: 1.5 MMOL/L (ref 0.4–2)
LEUKOCYTE ESTERASE UR QL STRIP.AUTO: ABNORMAL
LEUKOCYTE ESTERASE UR QL STRIP.AUTO: ABNORMAL
LEUKOCYTE ESTERASE UR QL STRIP.AUTO: NEGATIVE
LY# POC: ABNORMAL K/UL
LY# POC: NORMAL K/UL
LY# POC: NORMAL K/UL
LY% POC: ABNORMAL %
LY% POC: NORMAL %
LY% POC: NORMAL %
LYMPHOCYTES # BLD: 1 K/UL (ref 0.8–3.5)
LYMPHOCYTES # BLD: 1.1 K/UL (ref 0.8–3.5)
LYMPHOCYTES # BLD: 1.3 K/UL (ref 0.8–3.5)
LYMPHOCYTES # BLD: 1.4 K/UL (ref 0.8–3.5)
LYMPHOCYTES # BLD: 1.9 K/UL (ref 0.8–3.5)
LYMPHOCYTES # BLD: 2 K/UL (ref 0.8–3.5)
LYMPHOCYTES NFR BLD: 53 % (ref 12–49)
LYMPHOCYTES NFR BLD: 55 % (ref 12–49)
LYMPHOCYTES NFR BLD: 56 % (ref 12–49)
LYMPHOCYTES NFR BLD: 57 % (ref 12–49)
LYMPHOCYTES NFR BLD: 58 % (ref 12–49)
LYMPHOCYTES NFR BLD: 59 % (ref 12–49)
MCH RBC QN AUTO: 31.6 PG (ref 26–34)
MCH RBC QN AUTO: 31.9 PG (ref 26–34)
MCH RBC QN AUTO: 32.8 PG (ref 26–34)
MCH RBC QN AUTO: 33.1 PG (ref 26–34)
MCH RBC QN AUTO: 34.7 PG (ref 26–34)
MCH RBC QN AUTO: 35 PG (ref 26–34)
MCH RBC QN AUTO: 35.1 PG (ref 26–34)
MCH RBC QN AUTO: 35.1 PG (ref 26–34)
MCH RBC QN: ABNORMAL PG
MCH RBC QN: NORMAL PG
MCH RBC QN: NORMAL PG
MCHC RBC AUTO-ENTMCNC: 31.5 G/DL (ref 30–36.5)
MCHC RBC AUTO-ENTMCNC: 32.1 G/DL (ref 30–36.5)
MCHC RBC AUTO-ENTMCNC: 32.5 G/DL (ref 30–36.5)
MCHC RBC AUTO-ENTMCNC: 32.8 G/DL (ref 30–36.5)
MCHC RBC AUTO-ENTMCNC: 33.1 G/DL (ref 30–36.5)
MCHC RBC AUTO-ENTMCNC: 33.2 G/DL (ref 30–36.5)
MCHC RBC AUTO-ENTMCNC: 33.2 G/DL (ref 30–36.5)
MCHC RBC AUTO-ENTMCNC: 33.5 G/DL (ref 30–36.5)
MCHC RBC-ENTMCNC: ABNORMAL G/DL
MCHC RBC-ENTMCNC: NORMAL G/DL
MCHC RBC-ENTMCNC: NORMAL G/DL
MCV RBC AUTO: 100 FL (ref 80–99)
MCV RBC AUTO: 101.3 FL (ref 80–99)
MCV RBC AUTO: 103.4 FL (ref 80–99)
MCV RBC AUTO: 105.8 FL (ref 80–99)
MCV RBC AUTO: 105.9 FL (ref 80–99)
MCV RBC AUTO: 105.9 FL (ref 80–99)
MCV RBC AUTO: 106.6 FL (ref 80–99)
MCV RBC AUTO: 94.4 FL (ref 80–99)
MCV RBC: ABNORMAL FL
MCV RBC: NORMAL FL
MCV RBC: NORMAL FL
MID #, POC: ABNORMAL 10^3/UL
MID #, POC: NORMAL 10^3/UL
MID #, POC: NORMAL 10^3/UL
MID% POC: ABNORMAL %
MID% POC: NORMAL %
MID% POC: NORMAL %
MONOCYTES # BLD: 0.1 K/UL (ref 0–1)
MONOCYTES # BLD: 0.1 K/UL (ref 0–1)
MONOCYTES # BLD: 0.2 K/UL (ref 0–1)
MONOCYTES # BLD: 0.2 K/UL (ref 0–1)
MONOCYTES # BLD: 0.3 K/UL (ref 0–1)
MONOCYTES # BLD: 0.3 K/UL (ref 0–1)
MONOCYTES NFR BLD: 4 % (ref 5–13)
MONOCYTES NFR BLD: 6 % (ref 5–13)
MONOCYTES NFR BLD: 7 % (ref 5–13)
MONOCYTES NFR BLD: 7 % (ref 5–13)
MONOCYTES NFR BLD: 8 % (ref 5–13)
MONOCYTES NFR BLD: 9 % (ref 5–13)
MUCUS UA POCT, MUCPOCT: NORMAL
MUCUS UA POCT, MUCPOCT: NORMAL
NEUTS BAND NFR BLD MANUAL: 1 %
NEUTS SEG # BLD: 0.5 K/UL (ref 1.8–8)
NEUTS SEG # BLD: 0.7 K/UL (ref 1.8–8)
NEUTS SEG # BLD: 0.8 K/UL (ref 1.8–8)
NEUTS SEG # BLD: 1 K/UL (ref 1.8–8)
NEUTS SEG # BLD: 1.2 K/UL (ref 1.8–8)
NEUTS SEG # BLD: 1.3 K/UL (ref 1.8–8)
NEUTS SEG NFR BLD: 30 % (ref 32–75)
NEUTS SEG NFR BLD: 32 % (ref 32–75)
NEUTS SEG NFR BLD: 35 % (ref 32–75)
NEUTS SEG NFR BLD: 36 % (ref 32–75)
NEUTS SEG NFR BLD: 38 % (ref 32–75)
NEUTS SEG NFR BLD: 38 % (ref 32–75)
NITRITE UR QL STRIP.AUTO: NEGATIVE
NITRITE UR QL STRIP.AUTO: POSITIVE
NITRITE UR QL STRIP.AUTO: POSITIVE
NRBC # BLD: 0 K/UL (ref 0–0.01)
NRBC BLD-RTO: 0 PER 100 WBC
P-R INTERVAL, ECG05: 128 MS
P-R INTERVAL, ECG05: 138 MS
P-R INTERVAL, ECG05: 146 MS
PATH REV BLD -IMP: NORMAL
PH UR STRIP: 5.5 [PH] (ref 4.6–8)
PH UR STRIP: 5.5 [PH] (ref 5–8)
PH UR STRIP: 5.5 [PH] (ref 5–8)
PH UR STRIP: 6 [PH] (ref 4.6–8)
PH UR STRIP: 7.5 [PH] (ref 5–8)
PLATELET # BLD AUTO: 205 K/UL (ref 150–400)
PLATELET # BLD AUTO: 208 K/UL (ref 150–400)
PLATELET # BLD AUTO: 238 K/UL (ref 150–400)
PLATELET # BLD AUTO: 272 K/UL (ref 150–400)
PLATELET # BLD AUTO: 272 K/UL (ref 150–400)
PLATELET # BLD AUTO: 286 K/UL (ref 150–400)
PLATELET # BLD AUTO: 309 K/UL (ref 150–400)
PLATELET # BLD AUTO: 348 K/UL (ref 150–400)
PLATELET # BLD: ABNORMAL K/UL
PLATELET # BLD: NORMAL K/UL
PLATELET # BLD: NORMAL K/UL
PMV BLD AUTO: 10 FL (ref 8.9–12.9)
PMV BLD AUTO: 10.1 FL (ref 8.9–12.9)
PMV BLD AUTO: 10.6 FL (ref 8.9–12.9)
PMV BLD AUTO: 8.8 FL (ref 8.9–12.9)
PMV BLD AUTO: 9.5 FL (ref 8.9–12.9)
PMV BLD AUTO: 9.5 FL (ref 8.9–12.9)
PMV BLD AUTO: 9.6 FL (ref 8.9–12.9)
PMV BLD AUTO: 9.7 FL (ref 8.9–12.9)
POTASSIUM SERPL-SCNC: 3.1 MMOL/L (ref 3.5–5.1)
POTASSIUM SERPL-SCNC: 3.3 MMOL/L (ref 3.5–5.1)
POTASSIUM SERPL-SCNC: 3.4 MMOL/L (ref 3.5–5.1)
POTASSIUM SERPL-SCNC: 3.5 MMOL/L (ref 3.5–5.1)
POTASSIUM SERPL-SCNC: 3.5 MMOL/L (ref 3.5–5.1)
POTASSIUM SERPL-SCNC: 3.9 MMOL/L (ref 3.5–5.1)
POTASSIUM SERPL-SCNC: 3.9 MMOL/L (ref 3.5–5.2)
POTASSIUM SERPL-SCNC: 4.2 MMOL/L (ref 3.5–5.1)
PROT SERPL-MCNC: 6.7 G/DL (ref 6–8.5)
PROT SERPL-MCNC: 7.2 G/DL (ref 6.4–8.2)
PROT SERPL-MCNC: 7.3 G/DL (ref 6.4–8.2)
PROT SERPL-MCNC: 7.5 G/DL (ref 6.4–8.2)
PROT UR QL STRIP: NORMAL
PROT UR QL STRIP: NORMAL
PROT UR STRIP-MCNC: 100 MG/DL
PROT UR STRIP-MCNC: 100 MG/DL
PROT UR STRIP-MCNC: ABNORMAL MG/DL
PROTHROMBIN TIME: 11.9 SEC (ref 9–11.1)
PROTHROMBIN TIME: 15 SEC (ref 9–11.1)
Q-T INTERVAL, ECG07: 370 MS
Q-T INTERVAL, ECG07: 446 MS
Q-T INTERVAL, ECG07: 466 MS
QRS DURATION, ECG06: 80 MS
QRS DURATION, ECG06: 82 MS
QRS DURATION, ECG06: 90 MS
QTC CALCULATION (BEZET), ECG08: 452 MS
QTC CALCULATION (BEZET), ECG08: 481 MS
QTC CALCULATION (BEZET), ECG08: 513 MS
RBC # BLD AUTO: 1.78 M/UL (ref 3.8–5.2)
RBC # BLD AUTO: 1.91 M/UL (ref 3.8–5.2)
RBC # BLD AUTO: 1.96 M/UL (ref 3.8–5.2)
RBC # BLD AUTO: 2.05 M/UL (ref 3.8–5.2)
RBC # BLD AUTO: 2.32 M/UL (ref 3.8–5.2)
RBC # BLD AUTO: 2.37 M/UL (ref 3.8–5.2)
RBC # BLD AUTO: 2.38 M/UL (ref 3.8–5.2)
RBC # BLD AUTO: 2.69 M/UL (ref 3.8–5.2)
RBC # BLD: ABNORMAL M/UL
RBC # BLD: NORMAL M/UL
RBC # BLD: NORMAL M/UL
RBC #/AREA URNS HPF: ABNORMAL /HPF (ref 0–5)
RBC MORPH BLD: ABNORMAL
RBC UA POCT, RBCPOCT: 0
RBC UA POCT, RBCPOCT: NORMAL
SERVICE CMNT-IMP: ABNORMAL
SERVICE CMNT-IMP: NORMAL
SERVICE CMNT-IMP: NORMAL
SODIUM SERPL-SCNC: 137 MMOL/L (ref 134–144)
SODIUM SERPL-SCNC: 138 MMOL/L (ref 136–145)
SODIUM SERPL-SCNC: 139 MMOL/L (ref 136–145)
SODIUM SERPL-SCNC: 140 MMOL/L (ref 136–145)
SODIUM SERPL-SCNC: 140 MMOL/L (ref 136–145)
SODIUM SERPL-SCNC: 141 MMOL/L (ref 136–145)
SODIUM SERPL-SCNC: 141 MMOL/L (ref 136–145)
SODIUM SERPL-SCNC: 142 MMOL/L (ref 136–145)
SP GR UR REFRACTOMETRY: 1.01 (ref 1–1.03)
SP GR UR REFRACTOMETRY: 1.01 (ref 1–1.03)
SP GR UR REFRACTOMETRY: 1.02 (ref 1–1.03)
SP GR UR STRIP: 1.01 (ref 1–1.03)
SP GR UR STRIP: 1.01 (ref 1–1.03)
SPECIMEN EXP DATE BLD: NORMAL
SPECIMEN EXP DATE BLD: NORMAL
STATUS OF UNIT,%ST: NORMAL
STATUS OF UNIT,%ST: NORMAL
THERAPEUTIC RANGE,PTTT: NORMAL SECS (ref 58–77)
TIBC SERPL-MCNC: 192 UG/DL (ref 250–450)
TRICH UA POCT, TRICHPOC: NEGATIVE
TRICH UA POCT, TRICHPOC: NEGATIVE
TROPONIN I SERPL-MCNC: <0.04 NG/ML
UA UROBILINOGEN AMB POC: NORMAL (ref 0.2–1)
UA UROBILINOGEN AMB POC: NORMAL (ref 0.2–1)
UA: UC IF INDICATED,UAUC: ABNORMAL
UIBC SERPL-MCNC: 179 UG/DL (ref 118–369)
UNIT DIVISION, %UDIV: 0
UNIT DIVISION, %UDIV: 0
URINALYSIS CLARITY POC: CLEAR
URINALYSIS CLARITY POC: NORMAL
URINALYSIS COLOR POC: NORMAL
URINALYSIS COLOR POC: YELLOW
URINE BLOOD POC: NEGATIVE
URINE BLOOD POC: NORMAL
URINE CULT COMMENT, POCT: NORMAL
URINE CULT COMMENT, POCT: NORMAL
URINE LEUKOCYTES POC: NEGATIVE
URINE LEUKOCYTES POC: NORMAL
URINE NITRITES POC: NEGATIVE
URINE NITRITES POC: NEGATIVE
UROBILINOGEN UR QL STRIP.AUTO: 0.2 EU/DL (ref 0.2–1)
UROBILINOGEN UR QL STRIP.AUTO: 0.2 EU/DL (ref 0.2–1)
UROBILINOGEN UR QL STRIP.AUTO: 1 EU/DL (ref 0.2–1)
VENTRICULAR RATE, ECG03: 70 BPM
VENTRICULAR RATE, ECG03: 73 BPM
VENTRICULAR RATE, ECG03: 90 BPM
VIT B12 SERPL-MCNC: 411 PG/ML (ref 232–1245)
WBC # BLD AUTO: 1.6 K/UL (ref 3.6–11)
WBC # BLD AUTO: 1.9 K/UL (ref 3.6–11)
WBC # BLD AUTO: 2 K/UL (ref 3.6–11)
WBC # BLD AUTO: 2.3 K/UL (ref 3.6–11)
WBC # BLD AUTO: 2.3 K/UL (ref 3.6–11)
WBC # BLD AUTO: 2.6 K/UL (ref 3.6–11)
WBC # BLD AUTO: 3.5 K/UL (ref 3.6–11)
WBC # BLD AUTO: 3.5 K/UL (ref 3.6–11)
WBC # BLD: ABNORMAL K/UL
WBC # BLD: NORMAL K/UL
WBC # BLD: NORMAL K/UL
WBC MORPH BLD: ABNORMAL
WBC UA POCT, WBCPOCT: NORMAL
WBC UA POCT, WBCPOCT: NORMAL
WBC URNS QL MICRO: >100 /HPF (ref 0–4)
WBC URNS QL MICRO: ABNORMAL /HPF (ref 0–4)
WBC URNS QL MICRO: ABNORMAL /HPF (ref 0–4)
YEAST UA POCT, YEASTPOC: NEGATIVE
YEAST UA POCT, YEASTPOC: NEGATIVE

## 2018-01-01 PROCEDURE — G0299 HHS/HOSPICE OF RN EA 15 MIN: HCPCS

## 2018-01-01 PROCEDURE — 74011250637 HC RX REV CODE- 250/637: Performed by: INTERNAL MEDICINE

## 2018-01-01 PROCEDURE — 77030019908 HC STETH ESOPH SIMS -A: Performed by: NURSE ANESTHETIST, CERTIFIED REGISTERED

## 2018-01-01 PROCEDURE — 82272 OCCULT BLD FECES 1-3 TESTS: CPT | Performed by: EMERGENCY MEDICINE

## 2018-01-01 PROCEDURE — 36415 COLL VENOUS BLD VENIPUNCTURE: CPT

## 2018-01-01 PROCEDURE — 86923 COMPATIBILITY TEST ELECTRIC: CPT | Performed by: NURSE PRACTITIONER

## 2018-01-01 PROCEDURE — 87077 CULTURE AEROBIC IDENTIFY: CPT | Performed by: EMERGENCY MEDICINE

## 2018-01-01 PROCEDURE — 87040 BLOOD CULTURE FOR BACTERIA: CPT | Performed by: EMERGENCY MEDICINE

## 2018-01-01 PROCEDURE — 65270000029 HC RM PRIVATE

## 2018-01-01 PROCEDURE — 99283 EMERGENCY DEPT VISIT LOW MDM: CPT

## 2018-01-01 PROCEDURE — 70450 CT HEAD/BRAIN W/O DYE: CPT

## 2018-01-01 PROCEDURE — 74011250637 HC RX REV CODE- 250/637: Performed by: EMERGENCY MEDICINE

## 2018-01-01 PROCEDURE — HHS10554 SHAMPOO/BODY WASH 8 OZ ALOE VESTA

## 2018-01-01 PROCEDURE — G0155 HHCP-SVS OF CSW,EA 15 MIN: HCPCS

## 2018-01-01 PROCEDURE — 97162 PT EVAL MOD COMPLEX 30 MIN: CPT

## 2018-01-01 PROCEDURE — 84484 ASSAY OF TROPONIN QUANT: CPT | Performed by: EMERGENCY MEDICINE

## 2018-01-01 PROCEDURE — 87040 BLOOD CULTURE FOR BACTERIA: CPT | Performed by: INTERNAL MEDICINE

## 2018-01-01 PROCEDURE — 94761 N-INVAS EAR/PLS OXIMETRY MLT: CPT

## 2018-01-01 PROCEDURE — 65660000000 HC RM CCU STEPDOWN

## 2018-01-01 PROCEDURE — 81001 URINALYSIS AUTO W/SCOPE: CPT

## 2018-01-01 PROCEDURE — 77030018836 HC SOL IRR NACL ICUM -A: Performed by: ORTHOPAEDIC SURGERY

## 2018-01-01 PROCEDURE — 74011250636 HC RX REV CODE- 250/636: Performed by: INTERNAL MEDICINE

## 2018-01-01 PROCEDURE — 77030013079 HC BLNKT BAIR HGGR 3M -A: Performed by: NURSE ANESTHETIST, CERTIFIED REGISTERED

## 2018-01-01 PROCEDURE — A4314 CATH W/DRAINAGE 2-WAY LATEX: HCPCS

## 2018-01-01 PROCEDURE — 85018 HEMOGLOBIN: CPT | Performed by: PHYSICIAN ASSISTANT

## 2018-01-01 PROCEDURE — 74011250637 HC RX REV CODE- 250/637: Performed by: NURSE PRACTITIONER

## 2018-01-01 PROCEDURE — 76210000016 HC OR PH I REC 1 TO 1.5 HR: Performed by: ORTHOPAEDIC SURGERY

## 2018-01-01 PROCEDURE — 74011000250 HC RX REV CODE- 250

## 2018-01-01 PROCEDURE — 74011250636 HC RX REV CODE- 250/636

## 2018-01-01 PROCEDURE — 85025 COMPLETE CBC W/AUTO DIFF WBC: CPT | Performed by: INTERNAL MEDICINE

## 2018-01-01 PROCEDURE — A6250 SKIN SEAL PROTECT MOISTURIZR: HCPCS

## 2018-01-01 PROCEDURE — 85025 COMPLETE CBC W/AUTO DIFF WBC: CPT | Performed by: EMERGENCY MEDICINE

## 2018-01-01 PROCEDURE — C1776 JOINT DEVICE (IMPLANTABLE): HCPCS | Performed by: ORTHOPAEDIC SURGERY

## 2018-01-01 PROCEDURE — 97116 GAIT TRAINING THERAPY: CPT

## 2018-01-01 PROCEDURE — 87186 SC STD MICRODIL/AGAR DIL: CPT | Performed by: EMERGENCY MEDICINE

## 2018-01-01 PROCEDURE — 74011250636 HC RX REV CODE- 250/636: Performed by: ANESTHESIOLOGY

## 2018-01-01 PROCEDURE — 83550 IRON BINDING TEST: CPT

## 2018-01-01 PROCEDURE — HOSPICE MEDICATION HC HH HOSPICE MEDICATION

## 2018-01-01 PROCEDURE — 77030013708 HC HNDPC SUC IRR PULS STRY –B: Performed by: ORTHOPAEDIC SURGERY

## 2018-01-01 PROCEDURE — 74011250636 HC RX REV CODE- 250/636: Performed by: EMERGENCY MEDICINE

## 2018-01-01 PROCEDURE — 85610 PROTHROMBIN TIME: CPT

## 2018-01-01 PROCEDURE — G8978 MOBILITY CURRENT STATUS: HCPCS

## 2018-01-01 PROCEDURE — 36415 COLL VENOUS BLD VENIPUNCTURE: CPT | Performed by: EMERGENCY MEDICINE

## 2018-01-01 PROCEDURE — 0651 HSPC ROUTINE HOME CARE

## 2018-01-01 PROCEDURE — 36415 COLL VENOUS BLD VENIPUNCTURE: CPT | Performed by: INTERNAL MEDICINE

## 2018-01-01 PROCEDURE — 96372 THER/PROPH/DIAG INJ SC/IM: CPT

## 2018-01-01 PROCEDURE — 93971 EXTREMITY STUDY: CPT

## 2018-01-01 PROCEDURE — 77030006835 HC BLD SAW SAG STRY -B: Performed by: ORTHOPAEDIC SURGERY

## 2018-01-01 PROCEDURE — 36430 TRANSFUSION BLD/BLD COMPNT: CPT

## 2018-01-01 PROCEDURE — 74011250636 HC RX REV CODE- 250/636: Performed by: NURSE PRACTITIONER

## 2018-01-01 PROCEDURE — G8979 MOBILITY GOAL STATUS: HCPCS

## 2018-01-01 PROCEDURE — 82550 ASSAY OF CK (CPK): CPT | Performed by: EMERGENCY MEDICINE

## 2018-01-01 PROCEDURE — 80048 BASIC METABOLIC PNL TOTAL CA: CPT | Performed by: INTERNAL MEDICINE

## 2018-01-01 PROCEDURE — 97530 THERAPEUTIC ACTIVITIES: CPT

## 2018-01-01 PROCEDURE — 71045 X-RAY EXAM CHEST 1 VIEW: CPT

## 2018-01-01 PROCEDURE — 74011000258 HC RX REV CODE- 258: Performed by: INTERNAL MEDICINE

## 2018-01-01 PROCEDURE — 96365 THER/PROPH/DIAG IV INF INIT: CPT

## 2018-01-01 PROCEDURE — 83605 ASSAY OF LACTIC ACID: CPT | Performed by: EMERGENCY MEDICINE

## 2018-01-01 PROCEDURE — P9016 RBC LEUKOCYTES REDUCED: HCPCS | Performed by: NURSE PRACTITIONER

## 2018-01-01 PROCEDURE — 80053 COMPREHEN METABOLIC PANEL: CPT | Performed by: EMERGENCY MEDICINE

## 2018-01-01 PROCEDURE — 80053 COMPREHEN METABOLIC PANEL: CPT

## 2018-01-01 PROCEDURE — 80048 BASIC METABOLIC PNL TOTAL CA: CPT | Performed by: PHYSICIAN ASSISTANT

## 2018-01-01 PROCEDURE — 93005 ELECTROCARDIOGRAM TRACING: CPT

## 2018-01-01 PROCEDURE — 74011250636 HC RX REV CODE- 250/636: Performed by: PHYSICIAN ASSISTANT

## 2018-01-01 PROCEDURE — 87086 URINE CULTURE/COLONY COUNT: CPT | Performed by: EMERGENCY MEDICINE

## 2018-01-01 PROCEDURE — 77030020788: Performed by: ORTHOPAEDIC SURGERY

## 2018-01-01 PROCEDURE — 77030018846 HC SOL IRR STRL H20 ICUM -A: Performed by: ORTHOPAEDIC SURGERY

## 2018-01-01 PROCEDURE — 74011000250 HC RX REV CODE- 250: Performed by: ORTHOPAEDIC SURGERY

## 2018-01-01 PROCEDURE — 85027 COMPLETE CBC AUTOMATED: CPT | Performed by: INTERNAL MEDICINE

## 2018-01-01 PROCEDURE — 99285 EMERGENCY DEPT VISIT HI MDM: CPT

## 2018-01-01 PROCEDURE — 76010000149 HC OR TIME 1 TO 1.5 HR: Performed by: ORTHOPAEDIC SURGERY

## 2018-01-01 PROCEDURE — 73502 X-RAY EXAM HIP UNI 2-3 VIEWS: CPT

## 2018-01-01 PROCEDURE — 82728 ASSAY OF FERRITIN: CPT

## 2018-01-01 PROCEDURE — 3336500001 HSPC ELECTION

## 2018-01-01 PROCEDURE — 86900 BLOOD TYPING SEROLOGIC ABO: CPT | Performed by: EMERGENCY MEDICINE

## 2018-01-01 PROCEDURE — 82962 GLUCOSE BLOOD TEST: CPT

## 2018-01-01 PROCEDURE — 77010033678 HC OXYGEN DAILY

## 2018-01-01 PROCEDURE — 30233N1 TRANSFUSION OF NONAUTOLOGOUS RED BLOOD CELLS INTO PERIPHERAL VEIN, PERCUTANEOUS APPROACH: ICD-10-PCS | Performed by: INTERNAL MEDICINE

## 2018-01-01 PROCEDURE — 86900 BLOOD TYPING SEROLOGIC ABO: CPT | Performed by: NURSE PRACTITIONER

## 2018-01-01 PROCEDURE — 74011250637 HC RX REV CODE- 250/637: Performed by: HOSPITALIST

## 2018-01-01 PROCEDURE — 81001 URINALYSIS AUTO W/SCOPE: CPT | Performed by: EMERGENCY MEDICINE

## 2018-01-01 PROCEDURE — 74011000258 HC RX REV CODE- 258: Performed by: EMERGENCY MEDICINE

## 2018-01-01 PROCEDURE — A9270 NON-COVERED ITEM OR SERVICE: HCPCS

## 2018-01-01 PROCEDURE — 97161 PT EVAL LOW COMPLEX 20 MIN: CPT

## 2018-01-01 PROCEDURE — 85025 COMPLETE CBC W/AUTO DIFF WBC: CPT

## 2018-01-01 PROCEDURE — 99284 EMERGENCY DEPT VISIT MOD MDM: CPT

## 2018-01-01 PROCEDURE — 77030011943

## 2018-01-01 PROCEDURE — 77030031139 HC SUT VCRL2 J&J -A: Performed by: ORTHOPAEDIC SURGERY

## 2018-01-01 PROCEDURE — 73030 X-RAY EXAM OF SHOULDER: CPT

## 2018-01-01 PROCEDURE — 0SRR0J9 REPLACEMENT OF RIGHT HIP JOINT, FEMORAL SURFACE WITH SYNTHETIC SUBSTITUTE, CEMENTED, OPEN APPROACH: ICD-10-PCS | Performed by: ORTHOPAEDIC SURGERY

## 2018-01-01 PROCEDURE — 77030011640 HC PAD GRND REM COVD -A: Performed by: ORTHOPAEDIC SURGERY

## 2018-01-01 PROCEDURE — P9016 RBC LEUKOCYTES REDUCED: HCPCS | Performed by: EMERGENCY MEDICINE

## 2018-01-01 PROCEDURE — 82607 VITAMIN B-12: CPT

## 2018-01-01 PROCEDURE — 86923 COMPATIBILITY TEST ELECTRIC: CPT | Performed by: EMERGENCY MEDICINE

## 2018-01-01 PROCEDURE — G0156 HHCP-SVS OF AIDE,EA 15 MIN: HCPCS

## 2018-01-01 PROCEDURE — 82550 ASSAY OF CK (CPK): CPT | Performed by: INTERNAL MEDICINE

## 2018-01-01 PROCEDURE — 77030026438 HC STYL ET INTUB CARD -A: Performed by: NURSE ANESTHETIST, CERTIFIED REGISTERED

## 2018-01-01 PROCEDURE — T4541 LARGE DISPOSABLE UNDERPAD: HCPCS

## 2018-01-01 PROCEDURE — 77030008684 HC TU ET CUF COVD -B: Performed by: NURSE ANESTHETIST, CERTIFIED REGISTERED

## 2018-01-01 PROCEDURE — 84484 ASSAY OF TROPONIN QUANT: CPT | Performed by: INTERNAL MEDICINE

## 2018-01-01 PROCEDURE — 96374 THER/PROPH/DIAG INJ IV PUSH: CPT

## 2018-01-01 PROCEDURE — 85730 THROMBOPLASTIN TIME PARTIAL: CPT

## 2018-01-01 PROCEDURE — 36415 COLL VENOUS BLD VENIPUNCTURE: CPT | Performed by: PHYSICIAN ASSISTANT

## 2018-01-01 PROCEDURE — 74011000272 HC RX REV CODE- 272: Performed by: ORTHOPAEDIC SURGERY

## 2018-01-01 PROCEDURE — 76060000033 HC ANESTHESIA 1 TO 1.5 HR: Performed by: ORTHOPAEDIC SURGERY

## 2018-01-01 PROCEDURE — 85610 PROTHROMBIN TIME: CPT | Performed by: EMERGENCY MEDICINE

## 2018-01-01 DEVICE — IMPLANTABLE DEVICE
Type: IMPLANTABLE DEVICE | Site: HIP | Status: FUNCTIONAL
Brand: RINGLOC BI-POLAR HIP SYSTEM

## 2018-01-01 DEVICE — HIP PRI POR BIPLR STD HD ECHO -- H4: Type: IMPLANTABLE DEVICE | Site: HIP | Status: FUNCTIONAL

## 2018-01-01 DEVICE — IMPLANTABLE DEVICE
Type: IMPLANTABLE DEVICE | Site: HIP | Status: FUNCTIONAL
Brand: ECHO BI-METRIC HIP SYSTEM

## 2018-01-01 DEVICE — IMPLANTABLE DEVICE
Type: IMPLANTABLE DEVICE | Site: HIP | Status: FUNCTIONAL
Brand: BIOMET HIP SYSTEM

## 2018-01-01 RX ORDER — PROPOFOL 10 MG/ML
INJECTION, EMULSION INTRAVENOUS AS NEEDED
Status: DISCONTINUED | OUTPATIENT
Start: 2018-01-01 | End: 2018-01-01 | Stop reason: HOSPADM

## 2018-01-01 RX ORDER — SODIUM CHLORIDE 0.9 % (FLUSH) 0.9 %
5-10 SYRINGE (ML) INJECTION EVERY 8 HOURS
Status: DISCONTINUED | OUTPATIENT
Start: 2018-01-01 | End: 2018-01-01 | Stop reason: HOSPADM

## 2018-01-01 RX ORDER — POTASSIUM CHLORIDE 750 MG/1
40 TABLET, FILM COATED, EXTENDED RELEASE ORAL
Status: DISPENSED | OUTPATIENT
Start: 2018-01-01 | End: 2018-01-01

## 2018-01-01 RX ORDER — SODIUM CHLORIDE 9 MG/ML
75 INJECTION, SOLUTION INTRAVENOUS CONTINUOUS
Status: DISCONTINUED | OUTPATIENT
Start: 2018-01-01 | End: 2018-01-01

## 2018-01-01 RX ORDER — SODIUM CHLORIDE 9 MG/ML
75 INJECTION, SOLUTION INTRAVENOUS CONTINUOUS
Status: DISCONTINUED | OUTPATIENT
Start: 2018-01-01 | End: 2018-01-01 | Stop reason: SDUPTHER

## 2018-01-01 RX ORDER — MORPHINE SULFATE 10 MG/ML
2 INJECTION, SOLUTION INTRAMUSCULAR; INTRAVENOUS
Status: DISCONTINUED | OUTPATIENT
Start: 2018-01-01 | End: 2018-01-01 | Stop reason: HOSPADM

## 2018-01-01 RX ORDER — FACIAL-BODY WIPES
10 EACH TOPICAL DAILY PRN
Status: DISCONTINUED | OUTPATIENT
Start: 2018-01-01 | End: 2018-01-01 | Stop reason: HOSPADM

## 2018-01-01 RX ORDER — ALPRAZOLAM 0.25 MG/1
0.25 TABLET ORAL
Status: DISCONTINUED | OUTPATIENT
Start: 2018-01-01 | End: 2018-01-01 | Stop reason: HOSPADM

## 2018-01-01 RX ORDER — METOPROLOL SUCCINATE 50 MG/1
50 TABLET, EXTENDED RELEASE ORAL
Status: DISCONTINUED | OUTPATIENT
Start: 2018-01-01 | End: 2018-01-01 | Stop reason: HOSPADM

## 2018-01-01 RX ORDER — NEOSTIGMINE METHYLSULFATE 1 MG/ML
INJECTION INTRAVENOUS AS NEEDED
Status: DISCONTINUED | OUTPATIENT
Start: 2018-01-01 | End: 2018-01-01 | Stop reason: HOSPADM

## 2018-01-01 RX ORDER — CEFAZOLIN SODIUM 1 G/3ML
INJECTION, POWDER, FOR SOLUTION INTRAMUSCULAR; INTRAVENOUS AS NEEDED
Status: DISCONTINUED | OUTPATIENT
Start: 2018-01-01 | End: 2018-01-01 | Stop reason: HOSPADM

## 2018-01-01 RX ORDER — SODIUM CHLORIDE 0.9 % (FLUSH) 0.9 %
5-10 SYRINGE (ML) INJECTION AS NEEDED
Status: DISCONTINUED | OUTPATIENT
Start: 2018-01-01 | End: 2018-01-01 | Stop reason: HOSPADM

## 2018-01-01 RX ORDER — CEPHALEXIN 250 MG/1
500 CAPSULE ORAL 4 TIMES DAILY
Status: DISCONTINUED | OUTPATIENT
Start: 2018-01-01 | End: 2018-01-01 | Stop reason: HOSPADM

## 2018-01-01 RX ORDER — HYDROMORPHONE HYDROCHLORIDE 2 MG/ML
.2-.5 INJECTION, SOLUTION INTRAMUSCULAR; INTRAVENOUS; SUBCUTANEOUS
Status: DISCONTINUED | OUTPATIENT
Start: 2018-01-01 | End: 2018-01-01 | Stop reason: HOSPADM

## 2018-01-01 RX ORDER — CEFAZOLIN SODIUM/WATER 2 G/20 ML
2 SYRINGE (ML) INTRAVENOUS EVERY 8 HOURS
Status: DISPENSED | OUTPATIENT
Start: 2018-01-01 | End: 2018-01-01

## 2018-01-01 RX ORDER — DIPHENHYDRAMINE HYDROCHLORIDE 50 MG/ML
12.5 INJECTION, SOLUTION INTRAMUSCULAR; INTRAVENOUS AS NEEDED
Status: DISCONTINUED | OUTPATIENT
Start: 2018-01-01 | End: 2018-01-01 | Stop reason: HOSPADM

## 2018-01-01 RX ORDER — SODIUM CHLORIDE 0.9 % (FLUSH) 0.9 %
5-10 SYRINGE (ML) INJECTION AS NEEDED
Status: DISCONTINUED | OUTPATIENT
Start: 2018-01-01 | End: 2018-01-01 | Stop reason: SDUPTHER

## 2018-01-01 RX ORDER — CEFAZOLIN SODIUM/WATER 2 G/20 ML
SYRINGE (ML) INTRAVENOUS
Status: DISPENSED
Start: 2018-01-01 | End: 2018-01-01

## 2018-01-01 RX ORDER — KETOROLAC TROMETHAMINE 30 MG/ML
INJECTION, SOLUTION INTRAMUSCULAR; INTRAVENOUS
Status: COMPLETED
Start: 2018-01-01 | End: 2018-01-01

## 2018-01-01 RX ORDER — ASPIRIN 81 MG/1
81 TABLET ORAL DAILY
Status: DISCONTINUED | OUTPATIENT
Start: 2018-01-01 | End: 2018-01-01 | Stop reason: HOSPADM

## 2018-01-01 RX ORDER — SERTRALINE HYDROCHLORIDE 50 MG/1
75 TABLET, FILM COATED ORAL DAILY
Status: DISCONTINUED | OUTPATIENT
Start: 2018-01-01 | End: 2018-01-01 | Stop reason: HOSPADM

## 2018-01-01 RX ORDER — CEPHALEXIN 500 MG/1
500 CAPSULE ORAL 3 TIMES DAILY
Qty: 21 CAP | Refills: 0 | Status: SHIPPED | OUTPATIENT
Start: 2018-01-01 | End: 2018-01-01

## 2018-01-01 RX ORDER — ACETAMINOPHEN 500 MG
1000 TABLET ORAL ONCE
Status: COMPLETED | OUTPATIENT
Start: 2018-01-01 | End: 2018-01-01

## 2018-01-01 RX ORDER — ONDANSETRON 2 MG/ML
4 INJECTION INTRAMUSCULAR; INTRAVENOUS
Status: DISCONTINUED | OUTPATIENT
Start: 2018-01-01 | End: 2018-01-01 | Stop reason: HOSPADM

## 2018-01-01 RX ORDER — POLYETHYLENE GLYCOL 3350 17 G/17G
17 POWDER, FOR SOLUTION ORAL DAILY
Status: DISCONTINUED | OUTPATIENT
Start: 2018-01-01 | End: 2018-01-01 | Stop reason: HOSPADM

## 2018-01-01 RX ORDER — SODIUM CHLORIDE, SODIUM LACTATE, POTASSIUM CHLORIDE, CALCIUM CHLORIDE 600; 310; 30; 20 MG/100ML; MG/100ML; MG/100ML; MG/100ML
25 INJECTION, SOLUTION INTRAVENOUS CONTINUOUS
Status: DISCONTINUED | OUTPATIENT
Start: 2018-01-01 | End: 2018-01-01 | Stop reason: HOSPADM

## 2018-01-01 RX ORDER — ONDANSETRON 2 MG/ML
4 INJECTION INTRAMUSCULAR; INTRAVENOUS AS NEEDED
Status: DISCONTINUED | OUTPATIENT
Start: 2018-01-01 | End: 2018-01-01 | Stop reason: HOSPADM

## 2018-01-01 RX ORDER — HEPARIN SODIUM 5000 [USP'U]/ML
5000 INJECTION, SOLUTION INTRAVENOUS; SUBCUTANEOUS EVERY 8 HOURS
Status: DISCONTINUED | OUTPATIENT
Start: 2018-01-01 | End: 2018-01-01 | Stop reason: HOSPADM

## 2018-01-01 RX ORDER — SODIUM CHLORIDE 9 MG/ML
75 INJECTION, SOLUTION INTRAVENOUS CONTINUOUS
Status: DISCONTINUED | OUTPATIENT
Start: 2018-01-01 | End: 2018-01-01 | Stop reason: HOSPADM

## 2018-01-01 RX ORDER — ACETAMINOPHEN 10 MG/ML
INJECTION, SOLUTION INTRAVENOUS
Status: COMPLETED
Start: 2018-01-01 | End: 2018-01-01

## 2018-01-01 RX ORDER — DIPHENHYDRAMINE HCL 12.5MG/5ML
12.5 ELIXIR ORAL
Status: DISCONTINUED | OUTPATIENT
Start: 2018-01-01 | End: 2018-01-01 | Stop reason: HOSPADM

## 2018-01-01 RX ORDER — ALPRAZOLAM 0.25 MG/1
0.25 TABLET ORAL
Status: COMPLETED | OUTPATIENT
Start: 2018-01-01 | End: 2018-01-01

## 2018-01-01 RX ORDER — NALOXONE HYDROCHLORIDE 0.4 MG/ML
0.4 INJECTION, SOLUTION INTRAMUSCULAR; INTRAVENOUS; SUBCUTANEOUS AS NEEDED
Status: DISCONTINUED | OUTPATIENT
Start: 2018-01-01 | End: 2018-01-01 | Stop reason: HOSPADM

## 2018-01-01 RX ORDER — POTASSIUM CHLORIDE 7.45 MG/ML
10 INJECTION INTRAVENOUS
Status: COMPLETED | OUTPATIENT
Start: 2018-01-01 | End: 2018-01-01

## 2018-01-01 RX ORDER — DOCUSATE SODIUM 100 MG/1
100 CAPSULE, LIQUID FILLED ORAL
Status: DISCONTINUED | OUTPATIENT
Start: 2018-01-01 | End: 2018-01-01 | Stop reason: HOSPADM

## 2018-01-01 RX ORDER — ALPRAZOLAM 0.25 MG/1
0.25 TABLET ORAL 2 TIMES DAILY
COMMUNITY

## 2018-01-01 RX ORDER — METOPROLOL SUCCINATE 50 MG/1
50 TABLET, EXTENDED RELEASE ORAL DAILY
Status: DISCONTINUED | OUTPATIENT
Start: 2018-01-01 | End: 2018-01-01 | Stop reason: SDUPTHER

## 2018-01-01 RX ORDER — SUCCINYLCHOLINE CHLORIDE 20 MG/ML
INJECTION INTRAMUSCULAR; INTRAVENOUS AS NEEDED
Status: DISCONTINUED | OUTPATIENT
Start: 2018-01-01 | End: 2018-01-01 | Stop reason: HOSPADM

## 2018-01-01 RX ORDER — METOPROLOL SUCCINATE 25 MG/1
25 TABLET, EXTENDED RELEASE ORAL DAILY
Status: DISCONTINUED | OUTPATIENT
Start: 2018-01-01 | End: 2018-01-01

## 2018-01-01 RX ORDER — METOPROLOL TARTRATE 25 MG/1
25 TABLET, FILM COATED ORAL ONCE
Status: COMPLETED | OUTPATIENT
Start: 2018-01-01 | End: 2018-01-01

## 2018-01-01 RX ORDER — MORPHINE SULFATE 2 MG/ML
2 INJECTION, SOLUTION INTRAMUSCULAR; INTRAVENOUS
Status: DISCONTINUED | OUTPATIENT
Start: 2018-01-01 | End: 2018-01-01

## 2018-01-01 RX ORDER — OXYCODONE HYDROCHLORIDE 5 MG/1
2.5 TABLET ORAL
Status: DISCONTINUED | OUTPATIENT
Start: 2018-01-01 | End: 2018-01-01

## 2018-01-01 RX ORDER — SULFAMETHOXAZOLE AND TRIMETHOPRIM 800; 160 MG/1; MG/1
1 TABLET ORAL 2 TIMES DAILY
Qty: 20 TAB | Refills: 0 | Status: SHIPPED | OUTPATIENT
Start: 2018-01-01 | End: 2018-01-01

## 2018-01-01 RX ORDER — OXYCODONE HYDROCHLORIDE 5 MG/1
5 TABLET ORAL
Status: DISCONTINUED | OUTPATIENT
Start: 2018-01-01 | End: 2018-01-01

## 2018-01-01 RX ORDER — FENTANYL CITRATE 50 UG/ML
50 INJECTION, SOLUTION INTRAMUSCULAR; INTRAVENOUS
Status: COMPLETED | OUTPATIENT
Start: 2018-01-01 | End: 2018-01-01

## 2018-01-01 RX ORDER — ACETAMINOPHEN 325 MG/1
650 TABLET ORAL
Status: DISCONTINUED | OUTPATIENT
Start: 2018-01-01 | End: 2018-01-01

## 2018-01-01 RX ORDER — SODIUM CHLORIDE 9 MG/ML
250 INJECTION, SOLUTION INTRAVENOUS AS NEEDED
Status: DISCONTINUED | OUTPATIENT
Start: 2018-01-01 | End: 2018-01-01 | Stop reason: HOSPADM

## 2018-01-01 RX ORDER — METOPROLOL SUCCINATE 50 MG/1
50 TABLET, EXTENDED RELEASE ORAL
COMMUNITY

## 2018-01-01 RX ORDER — AMOXICILLIN 250 MG
2 CAPSULE ORAL 2 TIMES DAILY
Status: DISCONTINUED | OUTPATIENT
Start: 2018-01-01 | End: 2018-01-01 | Stop reason: HOSPADM

## 2018-01-01 RX ORDER — METOPROLOL SUCCINATE 25 MG/1
50 TABLET, EXTENDED RELEASE ORAL DAILY
Qty: 30 TAB | Refills: 11 | Status: SHIPPED
Start: 2018-01-01 | End: 2018-01-01 | Stop reason: SDUPTHER

## 2018-01-01 RX ORDER — MORPHINE SULFATE 2 MG/ML
1 INJECTION, SOLUTION INTRAMUSCULAR; INTRAVENOUS
Status: COMPLETED | OUTPATIENT
Start: 2018-01-01 | End: 2018-01-01

## 2018-01-01 RX ORDER — SODIUM CHLORIDE, SODIUM LACTATE, POTASSIUM CHLORIDE, CALCIUM CHLORIDE 600; 310; 30; 20 MG/100ML; MG/100ML; MG/100ML; MG/100ML
INJECTION, SOLUTION INTRAVENOUS
Status: DISCONTINUED | OUTPATIENT
Start: 2018-01-01 | End: 2018-01-01 | Stop reason: HOSPADM

## 2018-01-01 RX ORDER — KETOROLAC TROMETHAMINE 30 MG/ML
15 INJECTION, SOLUTION INTRAMUSCULAR; INTRAVENOUS EVERY 6 HOURS
Status: COMPLETED | OUTPATIENT
Start: 2018-01-01 | End: 2018-01-01

## 2018-01-01 RX ORDER — GLYCOPYRROLATE 0.2 MG/ML
INJECTION INTRAMUSCULAR; INTRAVENOUS AS NEEDED
Status: DISCONTINUED | OUTPATIENT
Start: 2018-01-01 | End: 2018-01-01 | Stop reason: HOSPADM

## 2018-01-01 RX ORDER — CEFAZOLIN SODIUM/WATER 2 G/20 ML
2 SYRINGE (ML) INTRAVENOUS ONCE
Status: DISCONTINUED | OUTPATIENT
Start: 2018-01-01 | End: 2018-01-01 | Stop reason: SDUPTHER

## 2018-01-01 RX ORDER — OXYCODONE HCL 20 MG/ML
5 CONCENTRATE, ORAL ORAL
Status: DISCONTINUED | OUTPATIENT
Start: 2018-01-01 | End: 2018-01-01 | Stop reason: HOSPADM

## 2018-01-01 RX ORDER — ACETAMINOPHEN 325 MG/1
650 TABLET ORAL
Status: DISCONTINUED | OUTPATIENT
Start: 2018-01-01 | End: 2018-01-01 | Stop reason: HOSPADM

## 2018-01-01 RX ORDER — GABAPENTIN 300 MG/1
300 CAPSULE ORAL 2 TIMES DAILY
Status: DISCONTINUED | OUTPATIENT
Start: 2018-01-01 | End: 2018-01-01 | Stop reason: HOSPADM

## 2018-01-01 RX ORDER — ONDANSETRON 2 MG/ML
4 INJECTION INTRAMUSCULAR; INTRAVENOUS
Status: DISCONTINUED | OUTPATIENT
Start: 2018-01-01 | End: 2018-01-01

## 2018-01-01 RX ORDER — LORAZEPAM 2 MG/ML
0.5 INJECTION INTRAMUSCULAR
Status: DISCONTINUED | OUTPATIENT
Start: 2018-01-01 | End: 2018-01-01 | Stop reason: HOSPADM

## 2018-01-01 RX ORDER — POTASSIUM CHLORIDE 1.5 G/1.77G
20 POWDER, FOR SOLUTION ORAL
Status: DISCONTINUED | OUTPATIENT
Start: 2018-01-01 | End: 2018-01-01

## 2018-01-01 RX ORDER — SODIUM CHLORIDE 9 MG/ML
1000 INJECTION, SOLUTION INTRAVENOUS ONCE
Status: COMPLETED | OUTPATIENT
Start: 2018-01-01 | End: 2018-01-01

## 2018-01-01 RX ORDER — ROCURONIUM BROMIDE 10 MG/ML
INJECTION, SOLUTION INTRAVENOUS AS NEEDED
Status: DISCONTINUED | OUTPATIENT
Start: 2018-01-01 | End: 2018-01-01 | Stop reason: HOSPADM

## 2018-01-01 RX ORDER — ACETAMINOPHEN/DIPHENHYDRAMINE 500MG-25MG
1 TABLET ORAL
COMMUNITY

## 2018-01-01 RX ORDER — DEXAMETHASONE SODIUM PHOSPHATE 4 MG/ML
10 INJECTION, SOLUTION INTRA-ARTICULAR; INTRALESIONAL; INTRAMUSCULAR; INTRAVENOUS; SOFT TISSUE ONCE
Status: COMPLETED | OUTPATIENT
Start: 2018-01-01 | End: 2018-01-01

## 2018-01-01 RX ORDER — ACETAMINOPHEN 650 MG/1
650 SUPPOSITORY RECTAL
Status: DISCONTINUED | OUTPATIENT
Start: 2018-01-01 | End: 2018-01-01 | Stop reason: HOSPADM

## 2018-01-01 RX ORDER — AMOXICILLIN 250 MG
1 CAPSULE ORAL 2 TIMES DAILY
Status: DISCONTINUED | OUTPATIENT
Start: 2018-01-01 | End: 2018-01-01 | Stop reason: HOSPADM

## 2018-01-01 RX ORDER — ACETAMINOPHEN 325 MG/1
650 TABLET ORAL EVERY 6 HOURS
Status: DISCONTINUED | OUTPATIENT
Start: 2018-01-01 | End: 2018-01-01

## 2018-01-01 RX ORDER — ONDANSETRON 2 MG/ML
INJECTION INTRAMUSCULAR; INTRAVENOUS AS NEEDED
Status: DISCONTINUED | OUTPATIENT
Start: 2018-01-01 | End: 2018-01-01 | Stop reason: HOSPADM

## 2018-01-01 RX ORDER — LIDOCAINE HYDROCHLORIDE 20 MG/ML
INJECTION, SOLUTION EPIDURAL; INFILTRATION; INTRACAUDAL; PERINEURAL AS NEEDED
Status: DISCONTINUED | OUTPATIENT
Start: 2018-01-01 | End: 2018-01-01 | Stop reason: HOSPADM

## 2018-01-01 RX ORDER — METOPROLOL SUCCINATE 50 MG/1
50 TABLET, EXTENDED RELEASE ORAL DAILY
Status: DISCONTINUED | OUTPATIENT
Start: 2018-01-01 | End: 2018-01-01 | Stop reason: HOSPADM

## 2018-01-01 RX ORDER — ASPIRIN 81 MG/1
81 TABLET ORAL DAILY
COMMUNITY
End: 2018-01-01

## 2018-01-01 RX ORDER — SERTRALINE HYDROCHLORIDE 50 MG/1
75 TABLET, FILM COATED ORAL DAILY
COMMUNITY

## 2018-01-01 RX ORDER — SODIUM CHLORIDE 9 MG/ML
125 INJECTION, SOLUTION INTRAVENOUS CONTINUOUS
Status: DISCONTINUED | OUTPATIENT
Start: 2018-01-01 | End: 2018-01-01

## 2018-01-01 RX ORDER — OXYCODONE HCL 20 MG/ML
2.5 CONCENTRATE, ORAL ORAL
Status: DISCONTINUED | OUTPATIENT
Start: 2018-01-01 | End: 2018-01-01 | Stop reason: HOSPADM

## 2018-01-01 RX ORDER — POTASSIUM CHLORIDE 750 MG/1
40 TABLET, FILM COATED, EXTENDED RELEASE ORAL
Status: COMPLETED | OUTPATIENT
Start: 2018-01-01 | End: 2018-01-01

## 2018-01-01 RX ORDER — ACETAMINOPHEN 500 MG
1000 TABLET ORAL
COMMUNITY

## 2018-01-01 RX ORDER — MORPHINE SULFATE 4 MG/ML
2 INJECTION, SOLUTION INTRAMUSCULAR; INTRAVENOUS
Status: DISCONTINUED | OUTPATIENT
Start: 2018-01-01 | End: 2018-01-01 | Stop reason: HOSPADM

## 2018-01-01 RX ORDER — HALOPERIDOL 5 MG/ML
1 INJECTION INTRAMUSCULAR
Status: DISCONTINUED | OUTPATIENT
Start: 2018-01-01 | End: 2018-01-01 | Stop reason: HOSPADM

## 2018-01-01 RX ORDER — ENOXAPARIN SODIUM 100 MG/ML
1 INJECTION SUBCUTANEOUS
Status: COMPLETED | OUTPATIENT
Start: 2018-01-01 | End: 2018-01-01

## 2018-01-01 RX ORDER — CEFAZOLIN SODIUM/WATER 2 G/20 ML
2 SYRINGE (ML) INTRAVENOUS
Status: COMPLETED | OUTPATIENT
Start: 2018-01-01 | End: 2018-01-01

## 2018-01-01 RX ORDER — ALPRAZOLAM 0.25 MG/1
0.25 TABLET ORAL 2 TIMES DAILY
Status: DISCONTINUED | OUTPATIENT
Start: 2018-01-01 | End: 2018-01-01 | Stop reason: HOSPADM

## 2018-01-01 RX ORDER — METOPROLOL SUCCINATE 50 MG/1
50 TABLET, EXTENDED RELEASE ORAL DAILY
Qty: 30 TAB | Refills: 5 | Status: SHIPPED | OUTPATIENT
Start: 2018-01-01 | End: 2018-01-01 | Stop reason: DRUGHIGH

## 2018-01-01 RX ORDER — FENTANYL CITRATE 50 UG/ML
INJECTION, SOLUTION INTRAMUSCULAR; INTRAVENOUS AS NEEDED
Status: DISCONTINUED | OUTPATIENT
Start: 2018-01-01 | End: 2018-01-01 | Stop reason: HOSPADM

## 2018-01-01 RX ORDER — POTASSIUM CHLORIDE 1.5 G/1.77G
40 POWDER, FOR SOLUTION ORAL
Status: COMPLETED | OUTPATIENT
Start: 2018-01-01 | End: 2018-01-01

## 2018-01-01 RX ORDER — ACETAMINOPHEN 10 MG/ML
1000 INJECTION, SOLUTION INTRAVENOUS ONCE
Status: COMPLETED | OUTPATIENT
Start: 2018-01-01 | End: 2018-01-01

## 2018-01-01 RX ORDER — CEPHALEXIN 500 MG/1
500 CAPSULE ORAL 4 TIMES DAILY
Qty: 40 CAP | Refills: 0 | Status: SHIPPED | OUTPATIENT
Start: 2018-01-01 | End: 2018-01-01

## 2018-01-01 RX ORDER — FENTANYL CITRATE 50 UG/ML
25 INJECTION, SOLUTION INTRAMUSCULAR; INTRAVENOUS
Status: DISCONTINUED | OUTPATIENT
Start: 2018-01-01 | End: 2018-01-01 | Stop reason: HOSPADM

## 2018-01-01 RX ORDER — FAMOTIDINE 20 MG/1
20 TABLET, FILM COATED ORAL 2 TIMES DAILY
Status: DISCONTINUED | OUTPATIENT
Start: 2018-01-01 | End: 2018-01-01 | Stop reason: HOSPADM

## 2018-01-01 RX ADMIN — Medication 10 ML: at 13:15

## 2018-01-01 RX ADMIN — Medication 10 ML: at 12:13

## 2018-01-01 RX ADMIN — MORPHINE SULFATE 2 MG: 2 INJECTION, SOLUTION INTRAMUSCULAR; INTRAVENOUS at 05:10

## 2018-01-01 RX ADMIN — HYDROMORPHONE HYDROCHLORIDE 0.5 MG: 2 INJECTION, SOLUTION INTRAMUSCULAR; INTRAVENOUS; SUBCUTANEOUS at 19:20

## 2018-01-01 RX ADMIN — METOPROLOL SUCCINATE 50 MG: 50 TABLET, EXTENDED RELEASE ORAL at 09:14

## 2018-01-01 RX ADMIN — LORAZEPAM 0.5 MG: 2 INJECTION INTRAMUSCULAR; INTRAVENOUS at 02:13

## 2018-01-01 RX ADMIN — FENTANYL CITRATE 25 MCG: 50 INJECTION, SOLUTION INTRAMUSCULAR; INTRAVENOUS at 19:20

## 2018-01-01 RX ADMIN — FENTANYL CITRATE 25 MCG: 50 INJECTION, SOLUTION INTRAMUSCULAR; INTRAVENOUS at 18:58

## 2018-01-01 RX ADMIN — MORPHINE SULFATE 2 MG: 2 INJECTION, SOLUTION INTRAMUSCULAR; INTRAVENOUS at 22:45

## 2018-01-01 RX ADMIN — ALPRAZOLAM 0.25 MG: 0.25 TABLET ORAL at 21:51

## 2018-01-01 RX ADMIN — LIDOCAINE HYDROCHLORIDE 60 MG: 20 INJECTION, SOLUTION EPIDURAL; INFILTRATION; INTRACAUDAL; PERINEURAL at 17:52

## 2018-01-01 RX ADMIN — Medication 10 ML: at 13:48

## 2018-01-01 RX ADMIN — ONDANSETRON HYDROCHLORIDE 4 MG: 2 INJECTION, SOLUTION INTRAMUSCULAR; INTRAVENOUS at 13:04

## 2018-01-01 RX ADMIN — MORPHINE SULFATE 2 MG: 2 INJECTION, SOLUTION INTRAMUSCULAR; INTRAVENOUS at 09:32

## 2018-01-01 RX ADMIN — METOPROLOL SUCCINATE 50 MG: 50 TABLET, EXTENDED RELEASE ORAL at 09:03

## 2018-01-01 RX ADMIN — ALPRAZOLAM 0.25 MG: 0.25 TABLET ORAL at 09:14

## 2018-01-01 RX ADMIN — MORPHINE SULFATE 2 MG: 4 INJECTION, SOLUTION INTRAMUSCULAR; INTRAVENOUS at 21:22

## 2018-01-01 RX ADMIN — Medication 10 ML: at 23:22

## 2018-01-01 RX ADMIN — Medication 10 ML: at 14:26

## 2018-01-01 RX ADMIN — CEPHALEXIN 500 MG: 250 CAPSULE ORAL at 18:10

## 2018-01-01 RX ADMIN — LORAZEPAM 0.5 MG: 2 INJECTION INTRAMUSCULAR; INTRAVENOUS at 19:39

## 2018-01-01 RX ADMIN — KETOROLAC TROMETHAMINE 15 MG: 30 INJECTION, SOLUTION INTRAMUSCULAR at 19:37

## 2018-01-01 RX ADMIN — ALPRAZOLAM 0.25 MG: 0.25 TABLET ORAL at 23:35

## 2018-01-01 RX ADMIN — LORAZEPAM 0.5 MG: 2 INJECTION INTRAMUSCULAR; INTRAVENOUS at 03:55

## 2018-01-01 RX ADMIN — METOPROLOL SUCCINATE 25 MG: 25 TABLET, EXTENDED RELEASE ORAL at 10:19

## 2018-01-01 RX ADMIN — CEFTRIAXONE SODIUM 1 G: 1 INJECTION, POWDER, FOR SOLUTION INTRAMUSCULAR; INTRAVENOUS at 15:40

## 2018-01-01 RX ADMIN — HEPARIN SODIUM 5000 UNITS: 5000 INJECTION, SOLUTION INTRAVENOUS; SUBCUTANEOUS at 05:59

## 2018-01-01 RX ADMIN — SODIUM CHLORIDE 75 ML/HR: 900 INJECTION, SOLUTION INTRAVENOUS at 06:36

## 2018-01-01 RX ADMIN — ACETAMINOPHEN 650 MG: 325 TABLET ORAL at 13:47

## 2018-01-01 RX ADMIN — HEPARIN SODIUM 5000 UNITS: 5000 INJECTION, SOLUTION INTRAVENOUS; SUBCUTANEOUS at 21:51

## 2018-01-01 RX ADMIN — Medication 10 ML: at 13:49

## 2018-01-01 RX ADMIN — HEPARIN SODIUM 5000 UNITS: 5000 INJECTION, SOLUTION INTRAVENOUS; SUBCUTANEOUS at 23:43

## 2018-01-01 RX ADMIN — SUCCINYLCHOLINE CHLORIDE 120 MG: 20 INJECTION INTRAMUSCULAR; INTRAVENOUS at 17:52

## 2018-01-01 RX ADMIN — Medication 10 ML: at 05:46

## 2018-01-01 RX ADMIN — LORAZEPAM 0.5 MG: 2 INJECTION INTRAMUSCULAR; INTRAVENOUS at 10:30

## 2018-01-01 RX ADMIN — ACETAMINOPHEN 650 MG: 325 TABLET ORAL at 00:45

## 2018-01-01 RX ADMIN — POTASSIUM CHLORIDE 40 MEQ: 750 TABLET, EXTENDED RELEASE ORAL at 18:00

## 2018-01-01 RX ADMIN — Medication 10 ML: at 22:35

## 2018-01-01 RX ADMIN — ENOXAPARIN SODIUM 50 MG: 60 INJECTION SUBCUTANEOUS at 18:32

## 2018-01-01 RX ADMIN — Medication 10 ML: at 21:30

## 2018-01-01 RX ADMIN — MORPHINE SULFATE 2 MG: 2 INJECTION, SOLUTION INTRAMUSCULAR; INTRAVENOUS at 12:11

## 2018-01-01 RX ADMIN — HEPARIN SODIUM 5000 UNITS: 5000 INJECTION, SOLUTION INTRAVENOUS; SUBCUTANEOUS at 13:18

## 2018-01-01 RX ADMIN — SERTRALINE HYDROCHLORIDE 75 MG: 50 TABLET ORAL at 10:18

## 2018-01-01 RX ADMIN — ALPRAZOLAM 0.25 MG: 0.25 TABLET ORAL at 19:58

## 2018-01-01 RX ADMIN — METOPROLOL SUCCINATE 50 MG: 50 TABLET, EXTENDED RELEASE ORAL at 10:59

## 2018-01-01 RX ADMIN — DEXAMETHASONE SODIUM PHOSPHATE 10 MG: 4 INJECTION, SOLUTION INTRAMUSCULAR; INTRAVENOUS at 08:39

## 2018-01-01 RX ADMIN — HEPARIN SODIUM 5000 UNITS: 5000 INJECTION, SOLUTION INTRAVENOUS; SUBCUTANEOUS at 21:21

## 2018-01-01 RX ADMIN — OXYCODONE HYDROCHLORIDE 5 MG: 100 SOLUTION ORAL at 12:29

## 2018-01-01 RX ADMIN — ONDANSETRON HYDROCHLORIDE 4 MG: 2 INJECTION, SOLUTION INTRAMUSCULAR; INTRAVENOUS at 18:49

## 2018-01-01 RX ADMIN — MORPHINE SULFATE 2 MG: 2 INJECTION, SOLUTION INTRAMUSCULAR; INTRAVENOUS at 18:22

## 2018-01-01 RX ADMIN — KETOROLAC TROMETHAMINE 15 MG: 30 INJECTION, SOLUTION INTRAMUSCULAR at 12:11

## 2018-01-01 RX ADMIN — GABAPENTIN 300 MG: 300 CAPSULE ORAL at 10:58

## 2018-01-01 RX ADMIN — POTASSIUM CHLORIDE 10 MEQ: 10 INJECTION, SOLUTION INTRAVENOUS at 15:40

## 2018-01-01 RX ADMIN — POTASSIUM CHLORIDE 10 MEQ: 10 INJECTION, SOLUTION INTRAVENOUS at 18:04

## 2018-01-01 RX ADMIN — FENTANYL CITRATE 25 MCG: 50 INJECTION, SOLUTION INTRAMUSCULAR; INTRAVENOUS at 19:26

## 2018-01-01 RX ADMIN — KETOROLAC TROMETHAMINE 15 MG: 30 INJECTION, SOLUTION INTRAMUSCULAR at 05:06

## 2018-01-01 RX ADMIN — Medication 10 ML: at 13:04

## 2018-01-01 RX ADMIN — SERTRALINE HYDROCHLORIDE 75 MG: 50 TABLET ORAL at 09:02

## 2018-01-01 RX ADMIN — POTASSIUM CHLORIDE 10 MEQ: 10 INJECTION, SOLUTION INTRAVENOUS at 15:09

## 2018-01-01 RX ADMIN — ACETAMINOPHEN 1000 MG: 500 TABLET ORAL at 16:51

## 2018-01-01 RX ADMIN — ASPIRIN 81 MG: 81 TABLET, COATED ORAL at 13:48

## 2018-01-01 RX ADMIN — HEPARIN SODIUM 5000 UNITS: 5000 INJECTION, SOLUTION INTRAVENOUS; SUBCUTANEOUS at 06:05

## 2018-01-01 RX ADMIN — ACETAMINOPHEN 1000 MG: 10 INJECTION, SOLUTION INTRAVENOUS at 19:44

## 2018-01-01 RX ADMIN — LORAZEPAM 0.5 MG: 2 INJECTION INTRAMUSCULAR; INTRAVENOUS at 08:32

## 2018-01-01 RX ADMIN — Medication 10 ML: at 21:34

## 2018-01-01 RX ADMIN — OXYCODONE HYDROCHLORIDE 5 MG: 5 TABLET ORAL at 16:37

## 2018-01-01 RX ADMIN — CEFTRIAXONE 1 G: 1 INJECTION, POWDER, FOR SOLUTION INTRAMUSCULAR; INTRAVENOUS at 17:31

## 2018-01-01 RX ADMIN — FENTANYL CITRATE 25 MCG: 50 INJECTION, SOLUTION INTRAMUSCULAR; INTRAVENOUS at 19:23

## 2018-01-01 RX ADMIN — SODIUM CHLORIDE 75 ML/HR: 900 INJECTION, SOLUTION INTRAVENOUS at 01:41

## 2018-01-01 RX ADMIN — MORPHINE SULFATE 2 MG: 2 INJECTION, SOLUTION INTRAMUSCULAR; INTRAVENOUS at 23:10

## 2018-01-01 RX ADMIN — MORPHINE SULFATE 2 MG: 2 INJECTION, SOLUTION INTRAMUSCULAR; INTRAVENOUS at 14:57

## 2018-01-01 RX ADMIN — CEFTRIAXONE SODIUM 1 G: 1 INJECTION, POWDER, FOR SOLUTION INTRAMUSCULAR; INTRAVENOUS at 15:15

## 2018-01-01 RX ADMIN — PROPOFOL 100 MG: 10 INJECTION, EMULSION INTRAVENOUS at 17:52

## 2018-01-01 RX ADMIN — Medication 10 ML: at 21:03

## 2018-01-01 RX ADMIN — ALPRAZOLAM 0.25 MG: 0.25 TABLET ORAL at 18:54

## 2018-01-01 RX ADMIN — MORPHINE SULFATE 2 MG: 4 INJECTION, SOLUTION INTRAMUSCULAR; INTRAVENOUS at 00:52

## 2018-01-01 RX ADMIN — POTASSIUM CHLORIDE 10 MEQ: 10 INJECTION, SOLUTION INTRAVENOUS at 14:25

## 2018-01-01 RX ADMIN — Medication 10 ML: at 15:41

## 2018-01-01 RX ADMIN — POTASSIUM CHLORIDE 10 MEQ: 10 INJECTION, SOLUTION INTRAVENOUS at 19:11

## 2018-01-01 RX ADMIN — Medication 10 ML: at 01:44

## 2018-01-01 RX ADMIN — ALPRAZOLAM 0.25 MG: 0.25 TABLET ORAL at 09:03

## 2018-01-01 RX ADMIN — ACETAMINOPHEN 975 MG: 650 SUPPOSITORY RECTAL at 16:42

## 2018-01-01 RX ADMIN — Medication 10 ML: at 22:36

## 2018-01-01 RX ADMIN — Medication 10 ML: at 05:18

## 2018-01-01 RX ADMIN — FENTANYL CITRATE 75 MCG: 50 INJECTION, SOLUTION INTRAMUSCULAR; INTRAVENOUS at 17:52

## 2018-01-01 RX ADMIN — SERTRALINE HYDROCHLORIDE 75 MG: 50 TABLET ORAL at 19:32

## 2018-01-01 RX ADMIN — ROCURONIUM BROMIDE 10 MG: 10 INJECTION, SOLUTION INTRAVENOUS at 17:52

## 2018-01-01 RX ADMIN — ROCURONIUM BROMIDE 20 MG: 10 INJECTION, SOLUTION INTRAVENOUS at 18:00

## 2018-01-01 RX ADMIN — MORPHINE SULFATE 2 MG: 2 INJECTION, SOLUTION INTRAMUSCULAR; INTRAVENOUS at 21:22

## 2018-01-01 RX ADMIN — SODIUM CHLORIDE 75 ML/HR: 900 INJECTION, SOLUTION INTRAVENOUS at 21:29

## 2018-01-01 RX ADMIN — Medication 2 G: at 12:12

## 2018-01-01 RX ADMIN — HEPARIN SODIUM 5000 UNITS: 5000 INJECTION, SOLUTION INTRAVENOUS; SUBCUTANEOUS at 14:24

## 2018-01-01 RX ADMIN — POTASSIUM CHLORIDE 40 MEQ: 1.5 POWDER, FOR SOLUTION ORAL at 10:58

## 2018-01-01 RX ADMIN — CEFTRIAXONE SODIUM 1 G: 1 INJECTION, POWDER, FOR SOLUTION INTRAMUSCULAR; INTRAVENOUS at 16:38

## 2018-01-01 RX ADMIN — SODIUM CHLORIDE 75 ML/HR: 900 INJECTION, SOLUTION INTRAVENOUS at 18:00

## 2018-01-01 RX ADMIN — Medication 10 ML: at 09:55

## 2018-01-01 RX ADMIN — FENTANYL CITRATE 50 MCG: 50 INJECTION, SOLUTION INTRAMUSCULAR; INTRAVENOUS at 11:55

## 2018-01-01 RX ADMIN — MORPHINE SULFATE 2 MG: 2 INJECTION, SOLUTION INTRAMUSCULAR; INTRAVENOUS at 08:39

## 2018-01-01 RX ADMIN — SERTRALINE HYDROCHLORIDE 75 MG: 50 TABLET ORAL at 09:14

## 2018-01-01 RX ADMIN — MORPHINE SULFATE 2 MG: 2 INJECTION, SOLUTION INTRAMUSCULAR; INTRAVENOUS at 11:58

## 2018-01-01 RX ADMIN — SODIUM CHLORIDE 1581 ML: 900 INJECTION, SOLUTION INTRAVENOUS at 15:15

## 2018-01-01 RX ADMIN — SODIUM CHLORIDE 500 ML: 900 INJECTION, SOLUTION INTRAVENOUS at 01:28

## 2018-01-01 RX ADMIN — Medication 10 ML: at 08:33

## 2018-01-01 RX ADMIN — HEPARIN SODIUM 5000 UNITS: 5000 INJECTION, SOLUTION INTRAVENOUS; SUBCUTANEOUS at 21:34

## 2018-01-01 RX ADMIN — MORPHINE SULFATE 1 MG: 2 INJECTION, SOLUTION INTRAMUSCULAR; INTRAVENOUS at 13:10

## 2018-01-01 RX ADMIN — ALPRAZOLAM 0.25 MG: 0.25 TABLET ORAL at 17:30

## 2018-01-01 RX ADMIN — HEPARIN SODIUM 5000 UNITS: 5000 INJECTION, SOLUTION INTRAVENOUS; SUBCUTANEOUS at 06:36

## 2018-01-01 RX ADMIN — ASPIRIN 81 MG: 81 TABLET, COATED ORAL at 10:19

## 2018-01-01 RX ADMIN — GABAPENTIN 300 MG: 300 CAPSULE ORAL at 18:26

## 2018-01-01 RX ADMIN — HEPARIN SODIUM 5000 UNITS: 5000 INJECTION, SOLUTION INTRAVENOUS; SUBCUTANEOUS at 05:17

## 2018-01-01 RX ADMIN — MORPHINE SULFATE 2 MG: 4 INJECTION, SOLUTION INTRAMUSCULAR; INTRAVENOUS at 03:35

## 2018-01-01 RX ADMIN — ALPRAZOLAM 0.25 MG: 0.25 TABLET ORAL at 16:51

## 2018-01-01 RX ADMIN — POTASSIUM CHLORIDE 10 MEQ: 10 INJECTION, SOLUTION INTRAVENOUS at 04:05

## 2018-01-01 RX ADMIN — METOPROLOL SUCCINATE 25 MG: 25 TABLET, EXTENDED RELEASE ORAL at 13:47

## 2018-01-01 RX ADMIN — KETOROLAC TROMETHAMINE 15 MG: 30 INJECTION, SOLUTION INTRAMUSCULAR at 01:27

## 2018-01-01 RX ADMIN — Medication 10 ML: at 06:02

## 2018-01-01 RX ADMIN — POTASSIUM CHLORIDE 10 MEQ: 10 INJECTION, SOLUTION INTRAVENOUS at 15:38

## 2018-01-01 RX ADMIN — Medication 2 G: at 21:41

## 2018-01-01 RX ADMIN — ASPIRIN 81 MG: 81 TABLET, COATED ORAL at 12:13

## 2018-01-01 RX ADMIN — Medication 10 ML: at 15:10

## 2018-01-01 RX ADMIN — ONDANSETRON 4 MG: 2 INJECTION INTRAMUSCULAR; INTRAVENOUS at 18:30

## 2018-01-01 RX ADMIN — MORPHINE SULFATE 2 MG: 4 INJECTION, SOLUTION INTRAMUSCULAR; INTRAVENOUS at 14:52

## 2018-01-01 RX ADMIN — SERTRALINE HYDROCHLORIDE 75 MG: 50 TABLET ORAL at 10:58

## 2018-01-01 RX ADMIN — CEFTRIAXONE SODIUM 1 G: 1 INJECTION, POWDER, FOR SOLUTION INTRAMUSCULAR; INTRAVENOUS at 14:53

## 2018-01-01 RX ADMIN — GLYCOPYRROLATE 0.5 MG: 0.2 INJECTION INTRAMUSCULAR; INTRAVENOUS at 18:38

## 2018-01-01 RX ADMIN — FENTANYL CITRATE 25 MCG: 50 INJECTION, SOLUTION INTRAMUSCULAR; INTRAVENOUS at 18:05

## 2018-01-01 RX ADMIN — MORPHINE SULFATE 2 MG: 2 INJECTION, SOLUTION INTRAMUSCULAR; INTRAVENOUS at 14:53

## 2018-01-01 RX ADMIN — HALOPERIDOL LACTATE 1 MG: 5 INJECTION, SOLUTION INTRAMUSCULAR at 22:00

## 2018-01-01 RX ADMIN — Medication 10 ML: at 05:44

## 2018-01-01 RX ADMIN — METOPROLOL SUCCINATE 50 MG: 50 TABLET, EXTENDED RELEASE ORAL at 09:33

## 2018-01-01 RX ADMIN — METOPROLOL TARTRATE 25 MG: 25 TABLET ORAL at 14:23

## 2018-01-01 RX ADMIN — ACETAMINOPHEN 650 MG: 325 SOLUTION ORAL at 18:09

## 2018-01-01 RX ADMIN — LORAZEPAM 0.5 MG: 2 INJECTION INTRAMUSCULAR; INTRAVENOUS at 21:37

## 2018-01-01 RX ADMIN — GABAPENTIN 300 MG: 300 CAPSULE ORAL at 19:32

## 2018-01-01 RX ADMIN — ALPRAZOLAM 0.25 MG: 0.25 TABLET ORAL at 21:34

## 2018-01-01 RX ADMIN — POTASSIUM CHLORIDE 10 MEQ: 10 INJECTION, SOLUTION INTRAVENOUS at 14:19

## 2018-01-01 RX ADMIN — Medication 10 ML: at 22:00

## 2018-01-01 RX ADMIN — Medication 10 ML: at 05:47

## 2018-01-01 RX ADMIN — SODIUM CHLORIDE 1440 ML: 900 INJECTION, SOLUTION INTRAVENOUS at 16:49

## 2018-01-01 RX ADMIN — ACETAMINOPHEN 650 MG: 325 TABLET ORAL at 21:34

## 2018-01-01 RX ADMIN — Medication 10 ML: at 23:43

## 2018-01-01 RX ADMIN — SODIUM CHLORIDE 1000 ML: 900 INJECTION, SOLUTION INTRAVENOUS at 13:10

## 2018-01-01 RX ADMIN — Medication 10 ML: at 06:37

## 2018-01-01 RX ADMIN — GABAPENTIN 300 MG: 300 CAPSULE ORAL at 10:19

## 2018-01-01 RX ADMIN — LORAZEPAM 0.5 MG: 2 INJECTION INTRAMUSCULAR; INTRAVENOUS at 15:38

## 2018-01-01 RX ADMIN — SODIUM CHLORIDE 75 ML/HR: 900 INJECTION, SOLUTION INTRAVENOUS at 16:36

## 2018-01-01 RX ADMIN — Medication 10 ML: at 13:47

## 2018-01-01 RX ADMIN — FENTANYL CITRATE 25 MCG: 50 INJECTION, SOLUTION INTRAMUSCULAR; INTRAVENOUS at 19:29

## 2018-01-01 RX ADMIN — CEFTRIAXONE 1 G: 1 INJECTION, POWDER, FOR SOLUTION INTRAMUSCULAR; INTRAVENOUS at 17:33

## 2018-01-01 RX ADMIN — ALPRAZOLAM 0.25 MG: 0.25 TABLET ORAL at 00:26

## 2018-01-01 RX ADMIN — NEOSTIGMINE METHYLSULFATE 3 MG: 1 INJECTION INTRAVENOUS at 18:38

## 2018-01-01 RX ADMIN — SODIUM CHLORIDE 125 ML/HR: 900 INJECTION, SOLUTION INTRAVENOUS at 19:18

## 2018-01-01 RX ADMIN — Medication 10 ML: at 21:56

## 2018-01-01 RX ADMIN — CEFAZOLIN SODIUM 2 G: 1 INJECTION, POWDER, FOR SOLUTION INTRAMUSCULAR; INTRAVENOUS at 18:00

## 2018-01-01 RX ADMIN — MORPHINE SULFATE 2 MG: 4 INJECTION, SOLUTION INTRAMUSCULAR; INTRAVENOUS at 09:58

## 2018-01-01 RX ADMIN — SENNOSIDES AND DOCUSATE SODIUM 2 TABLET: 8.6; 5 TABLET ORAL at 18:10

## 2018-01-01 RX ADMIN — SODIUM CHLORIDE 75 ML/HR: 900 INJECTION, SOLUTION INTRAVENOUS at 11:48

## 2018-01-01 RX ADMIN — Medication 10 ML: at 06:04

## 2018-01-01 RX ADMIN — SODIUM CHLORIDE, SODIUM LACTATE, POTASSIUM CHLORIDE, CALCIUM CHLORIDE: 600; 310; 30; 20 INJECTION, SOLUTION INTRAVENOUS at 17:45

## 2018-01-01 RX ADMIN — ONDANSETRON HYDROCHLORIDE 4 MG: 2 INJECTION, SOLUTION INTRAMUSCULAR; INTRAVENOUS at 13:14

## 2018-01-01 RX ADMIN — ASPIRIN 81 MG: 81 TABLET, COATED ORAL at 10:58

## 2018-01-01 RX ADMIN — MORPHINE SULFATE 2 MG: 2 INJECTION, SOLUTION INTRAMUSCULAR; INTRAVENOUS at 03:15

## 2018-01-01 RX ADMIN — MORPHINE SULFATE 2 MG: 2 INJECTION, SOLUTION INTRAMUSCULAR; INTRAVENOUS at 02:31

## 2018-01-01 RX ADMIN — Medication 10 ML: at 04:20

## 2018-01-01 RX ADMIN — HEPARIN SODIUM 5000 UNITS: 5000 INJECTION, SOLUTION INTRAVENOUS; SUBCUTANEOUS at 13:47

## 2018-01-01 RX ADMIN — SODIUM CHLORIDE 75 ML/HR: 900 INJECTION, SOLUTION INTRAVENOUS at 15:10

## 2018-01-01 RX ADMIN — MORPHINE SULFATE 2 MG: 4 INJECTION, SOLUTION INTRAMUSCULAR; INTRAVENOUS at 09:54

## 2018-02-13 PROBLEM — A41.9 SEPSIS (HCC): Status: ACTIVE | Noted: 2018-01-01

## 2018-02-13 PROBLEM — N39.0 UTI (URINARY TRACT INFECTION): Status: ACTIVE | Noted: 2018-01-01

## 2018-02-13 NOTE — PROGRESS NOTES
Chief Complaint   Patient presents with    Urinary Frequency    Vaginal Itching     Patient here for urinary frequency, vaginal itiching, shakes has dementia but seems worse per daughter. Received Cipro from Dr. Magaly Hale for UTI symptoms 1/16/18. Symptoms got somewhat better but not cleared totally and now has gotten worse per daughter who is with patient.

## 2018-02-13 NOTE — PROGRESS NOTES
Pharmacy Clarification of Prior to Admission Medication Regimen     The patient was interviewed regarding clarification of the prior to admission medication regimen. Patient's daughter was present in room and obtained permission from patient to discuss drug regimen with visitor(s) present. Patient was questioned regarding use of any other inhalers, topical products, over the counter medications, herbal medications, vitamin products or ophthalmic/nasal/otic medication use. Information Obtained From: Rx Query, patient, and patient's daughter    Pertinent Pharmacy Findings: None      PTA medication list was corrected to the following:     Prior to Admission Medications   Prescriptions Last Dose Informant Patient Reported? Taking? ALPRAZolam (XANAX) 0.25 mg tablet 2/13/2018 at Unknown time Child No Yes   Sig: TAKE ONE TABLET BY MOUTH TWICE DAILY AS NEEDED FOR ANXIETY. MAX OF 2 TABLETS DAILY  Indications: anxiety   IBUPROFEN/DIPHENHYDRAMINE CIT (ADVIL PM PO) 2/12/2018 at Unknown time Child Yes Yes   Sig: Take 2 Tabs by mouth nightly. aspirin delayed-release 81 mg tablet 2/12/2018 at Unknown time Child Yes Yes   Sig: Take 81 mg by mouth daily. gabapentin (NEURONTIN) 300 mg capsule 2/12/2018 at Unknown time Child No Yes   Sig: Take 1 Cap by mouth two (2) times a day. Can increase to BID after 1 week   losartan-hydroCHLOROthiazide (HYZAAR) 100-25 mg per tablet 2/12/2018 at Unknown time Child No Yes   Sig: TAKE ONE TABLET BY MOUTH ONE TIME DAILY   metoprolol succinate (TOPROL-XL) 25 mg XL tablet 2/12/2018 at Unknown time Child No Yes   Sig: Take 1 Tab by mouth daily. sertraline (ZOLOFT) 50 mg tablet 2/12/2018 at Unknown time Child Yes Yes   Sig: Take 75 mg by mouth daily.       Facility-Administered Medications: None          Thank you,  Lawson Hendrickson [facility]  Medication History Pharmacy Technician

## 2018-02-13 NOTE — ED NOTES
Pt arrives from triage with daughter after being referred by her PCP to come to the ER. Pt was sent for low BP, WBC of 2.2 and increased confusion, per daughter pt diagnosed with UTI PTA and PCP concerned for possible sepsis.

## 2018-02-13 NOTE — H&P
Hospitalist Admission Note    NAME: Fernanda Raines   :  1934   MRN:  [id number]     Date/Time:  2018 5:56 PM    Patient PCP: Tc Bowers MD  ________________________________________________________________________    My assessment of this patient's clinical condition and my plan of care is as follows. Assessment / Plan:  Severe sepsis (hypotension) due to gram-negative urinary tract infection  Patient was hypotensive before coming to ER but that is now improved with IV fluids. She does have increased frequency of urination and her UA is grossly abnormal.  Previous urine cultures grew E. coli which is pansensitive. Start ceftriaxone.  Urine cultures have already been sent.  Continue IV fluids with normal saline. Lactic acid is within normal limits. Acute metabolic encephalopathy likely related to UTI, delirium with underlying dementia  Neuro exam is non focal   CT brain shows ventriculomegaly and no acute process.  She will need outpatient follow-up for this. Hold home medications of Xanax, Neurontin and Zoloft until mental status is improved. Start Haldol as needed for agitation.  Telemetry monitoring.  QT interval is within normal limits. Mild rhabdomyolysis  CKs 812.  Continue IV fluids.  Recheck CK in a.m. Possible CKD stage III  Baseline creatinine is around 0.9.  Her creatinine now is 1.36. Continue IV fluids and recheck BMP in a.m. Hypertension  She was noted to have hypotension at primary care physician's office.   We will hold losartan, hydrochlorothiazide and metoprolol until blood pressure is better        Code Status: DNR  Surrogate Decision Maker: Jordan Haley    DVT Prophylaxis: heparin  GI Prophylaxis: not indicated    Baseline: lives at home with DTR and semi independent at home        Subjective:   CHIEF COMPLAINT: confusion and chills    HISTORY OF PRESENT ILLNESS:     This is a 27-year-old female with past medical history of hypertension, breast cancer presented to the ED after she was sent from her PCPs office for low blood pressure and also suspected urinary tract infection.  Patient is currently confused and cannot provide any information secondary to dementia so information is obtained by reviewing the records and also talking to patient's daughter. Dorene Dorsey at baseline lives at home with her daughter and is semi-independent on activities of daily living. [place] the last 2 days she is acting more confused than her normal.  She also had borderline fever along with chills.  She also had increased frequency of urination.  She also has been sleeping more than her normal.  She was recently treated with ciprofloxacin for 5 days for urinary tract infection.  She went to her PCPs office with above complaints and was also borderline hypotensive along with borderline fever for which she was referred to emergency department for further evaluation. On arrival to the hospital, patient was noted to have borderline fever but her blood pressure was stable.  She was given IV fluids for sepsis protocol and also ceftriaxone and urine cultures have been sent. We were asked to admit for work up and evaluation of the above problems. Past Medical History:   Diagnosis Date    Anemia     Anxiety     DDD (degenerative disc disease), cervical     Depression     Esophagitis     FH: breast cancer     FH: hypertension     Hypercholesterolemia     Hypertension     MR (mitral regurgitation)     MVP (mitral valve prolapse)     Stool color black         Past Surgical History:   Procedure Laterality Date    ABDOMEN SURGERY PROC UNLISTED  2015     and    2016    Bowel Blockage    DILATE ESOPHAGUS  6/3/2011         ENDOSCOPY, COLON, DIAGNOSTIC      due 2016    HX ABDOMINAL LAPAROSCOPY  10/2015    lap enterolysis for obstructing solitary adhesive band    HX CATARACT REMOVAL      HX GI      esoph dilatation    HX GI  08/27/2016    LAPAROTOMY EXPLORATORY, Ileocecectomy.  HX HYSTERECTOMY      HX ORTHOPAEDIC      R shoulder replacement    OR EGD TRANSORAL BIOPSY SINGLE/MULTIPLE  6/3/2011            Social History   Substance Use Topics    Smoking status: Former Smoker     Quit date: 8/5/1960    Smokeless tobacco: Never Used    Alcohol use No        Family History   Problem Relation Age of Onset    Cancer Mother      breast    Hypertension Mother     Diabetes Mother     Cancer Father      head and neck    Hypertension Father     Cancer Brother     Cancer Maternal Aunt      No Known Allergies     Prior to Admission medications    Medication Sig Start Date End Date Taking? Authorizing Provider   IBUPROFEN/DIPHENHYDRAMINE CIT (ADVIL PM PO) Take 2 Tabs by mouth nightly. Yes Historical Provider   aspirin delayed-release 81 mg tablet Take 81 mg by mouth daily. Yes Historical Provider   sertraline (ZOLOFT) 50 mg tablet Take 75 mg by mouth daily. Yes Historical Provider   ALPRAZolam (XANAX) 0.25 mg tablet TAKE ONE TABLET BY MOUTH TWICE DAILY AS NEEDED FOR ANXIETY. MAX OF 2 TABLETS DAILY  Indications: anxiety 11/1/17  Yes Loc [place] NP   gabapentin (NEURONTIN) 300 mg capsule Take 1 Cap by mouth two (2) times a day. Can increase to BID after 1 week 11/1/17  Yes Loc [place] NP   losartan-hydroCHLOROthiazide (HYZAAR) 100-25 mg per tablet TAKE ONE TABLET BY MOUTH ONE TIME DAILY 7/31/17  Yes Loc [place] NP   metoprolol succinate (TOPROL-XL) 25 mg XL tablet Take 1 Tab by mouth daily. 7/31/17  Yes Loc [place] NP       REVIEW OF SYSTEMS:     I am not able to complete the review of systems because:    The patient is intubated and sedated    The patient has altered mental status due to his acute medical problems    The patient has baseline aphasia from prior stroke(s)   y The patient has baseline dementia and is not reliable historian    The patient is in acute medical distress and unable to provide information           Total of 12 systems reviewed as follows:       POSITIVE= underlined text  Negative = text not underlined  General:  fever, chills, sweats, generalized weakness, weight loss/gain,      loss of appetite   Eyes:    blurred vision, eye pain, loss of vision, double vision  ENT:    rhinorrhea, pharyngitis   Respiratory:   cough, sputum production, SOB, [place], wheezing, pleuritic pain   Cardiology:   chest pain, palpitations, orthopnea, PND, edema, syncope   Gastrointestinal:  abdominal pain , N/V, diarrhea, dysphagia, constipation, bleeding   Genitourinary:  frequency, urgency, dysuria, hematuria, incontinence   Muskuloskeletal :  arthralgia, myalgia, back pain  Hematology:  easy bruising, nose or gum bleeding, lymphadenopathy   Dermatological: rash, ulceration, pruritis, color change / jaundice  Endocrine:   hot flashes or polydipsia   Neurological:  headache, dizziness, confusion, focal weakness, paresthesia,     Speech difficulties, memory loss, gait difficulty  Psychological: Feelings of anxiety, depression, agitation    Objective:   VITALS:    Visit Vitals    /63 (BP 1 Location: Right arm, BP Patient Position: At rest)    Pulse (!) 126    Temp 99.4 °F (37.4 °C)    Resp 16    Ht 5' 5\" (1.651 m)    Wt 52.7 kg (116 lb 3.2 oz)    SpO2 100%    BMI 19.34 kg/m2       PHYSICAL EXAM:    General:    no distress, appears stated age. HEENT: Atraumatic, anicteric sclerae, pink conjunctivae     No oral ulcers, mucosa moist  Neck:  Supple, symmetrical,  thyroid: non tender  Lungs:   Clear to auscultation bilaterally. No Wheezing or Rhonchi. No rales. Chest wall:  No tenderness  No Accessory muscle use. Heart:   Regular  rhythm,  No  murmur   No edema  Abdomen:   Soft, non-tender. Not distended. Bowel sounds normal  Extremities: No cyanosis. No clubbing,      Skin turgor normal, Radial dial pulse 2+  Skin:     Not pale. Not Jaundiced  No rashes   Psych:  Confused   Neurologic: EOMs intact. No aphasia or slurred speech.  Symmetrical strength, Sensation grossly intact. Alert and oriented X 0 .     _______________________________________________________________________  Care Plan discussed with:    Comments   Patient y    Family  y    RN y    Care Manager                    Consultant:      _______________________________________________________________________  Expected  Disposition:   Home with Family y   HH/PT/OT/RN    SNF/LTC    YGPSY    ________________________________________________________________________  TOTAL TIME:  61 Minutes    Critical Care Provided     Minutes non procedure based      Comments    y Reviewed previous records   >50% of visit spent in counseling and coordination of care y Discussion with patient and/or family and questions answered       ________________________________________________________________________  Signed: Xiomara Tolbert MD    Procedures: see electronic medical records for all procedures/Xrays and details which were not copied into this note but were reviewed prior to creation of Plan.     LAB DATA REVIEWED:    Recent Results (from the past 24 hour(s))   AMB POC URINALYSIS DIP STICK AUTO W/ MICRO     Collection Time: 02/13/18 10:30 AM   Result Value Ref Range    Color (UA POC) Yellow     Clarity (UA POC) Cloudy     Glucose (UA POC) Negative Negative    Bilirubin (UA POC) Negative Negative    Ketones (UA POC) Negative Negative    Specific gravity (UA POC) 1.015 1.001 - 1.035    Blood (UA POC) 2+ Negative    pH (UA POC) 6.0 4.6 - 8.0    Protein (UA POC) 2+ Negative    Urobilinogen (UA POC) 0.2 mg/dL 0.2 - 1    Nitrites (UA POC) Negative Negative    Leukocyte esterase (UA POC) 2+ Negative    Epithelial cells (UA POC) 2+     Mucus (UA POC) None     WBCs (UA POC) tntc     RBCs (UA POC) 5-10     Casts (UA POC) negative Negative    Crystals (UA POC) Negative Negative    Clue Cells (UA POC) NEGATIVE     Trichomonas (UA POC) Negative     Yeast (UA POC) Negative     Bacteria (UA POC) 4+ Negative    URINE CULT COMMENT (UA POC) AMB POC COMPLETE CBC,AUTOMATED ENTER    Collection Time: 02/13/18 11:25 AM   Result Value Ref Range    WBC (POC)  K/uL    RBC (POC)  M/uL    HGB (POC)  g/dL    HCT (POC)  %    MCV (POC)  fL    MCH (POC)  pg    MCHC (POC)  g/dL    PLATELET (POC)  K/uL    ABS. LYMPHS (POC)  K/uL    LYMPHOCYTES (POC)  %    Mid # (POC)  10^3/ul    MID% POC  %    ABS. GRANS (POC)  K/uL    GRANULOCYTES (POC)  %   CBC WITH AUTOMATED DIFF    Collection Time: 02/13/18  1:02 PM   Result Value Ref Range    WBC 2.6 (L) 3.6 - 11.0 K/uL    RBC 2.37 (L) 3.80 - 5.20 M/uL    HGB 8.3 (L) 11.5 - 16.0 g/dL    HCT 25.1 (L) 35.0 - 47.0 %    .9 (H) 80.0 - 99.0 FL    MCH 35.0 (H) 26.0 - 34.0 PG    MCHC 33.1 30.0 - 36.5 g/dL    RDW 17.1 (H) 11.5 - 14.5 %    PLATELET 664 635 - 632 K/uL    MPV 8.8 (L) 8.9 - 12.9 FL    NRBC 0.0 0  WBC    ABSOLUTE NRBC 0.00 0.00 - 0.01 K/uL    NEUTROPHILS 38 32 - 75 %    LYMPHOCYTES 55 (H) 12 - 49 %    MONOCYTES 6 5 - 13 %    EOSINOPHILS 0 0 - 7 %    BASOPHILS 1 0 - 1 %    IMMATURE GRANULOCYTES 0 0.0 - 0.5 %    ABS. NEUTROPHILS 1.0 (L) 1.8 - 8.0 K/UL    ABS. LYMPHOCYTES 1.4 0.8 - 3.5 K/UL    ABS. MONOCYTES 0.2 0.0 - 1.0 K/UL    ABS. EOSINOPHILS 0.0 0.0 - 0.4 K/UL    ABS. BASOPHILS 0.0 0.0 - 0.1 K/UL    ABS. IMM.  GRANS. 0.0 0.00 - 0.04 K/UL    DF SMEAR SCANNED      RBC COMMENTS OVALOCYTES  1+        RBC COMMENTS MACROCYTOSIS  1+       METABOLIC PANEL, COMPREHENSIVE    Collection Time: 02/13/18  1:02 PM   Result Value Ref Range    Sodium 139 136 - 145 mmol/L    Potassium 3.1 (L) 3.5 - 5.1 mmol/L    Chloride 103 97 - 108 mmol/L    CO2 33 (H) 21 - 32 mmol/L    Anion gap 3 (L) 5 - 15 mmol/L    Glucose 123 (H) 65 - 100 mg/dL    BUN 24 (H) 6 - 20 MG/DL    Creatinine 1.36 (H) 0.55 - 1.02 MG/DL    BUN/Creatinine ratio 18 12 - 20      GFR est AA 45 (L) >60 ml/min/1.73m2    GFR est non-AA 37 (L) >60 ml/min/1.73m2    Calcium 8.5 8.5 - 10.1 MG/DL    Bilirubin, total 0.6 0.2 - 1.0 MG/DL    ALT (SGPT) 23 12 - 78 U/L    AST (SGOT) 39 (H) 15 - 37 U/L    Alk.  phosphatase 105 45 - 117 U/L    Protein, total 7.5 6.4 - 8.2 g/dL    Albumin 3.6 3.5 - 5.0 g/dL    Globulin 3.9 2.0 - 4.0 g/dL    A-G Ratio 0.9 (L) 1.1 - 2.2     CK    Collection Time: 02/13/18  1:02 PM   Result Value Ref Range     (H) 26 - 192 U/L   TROPONIN I    Collection Time: 02/13/18  1:02 PM   Result Value Ref Range    Troponin-I, Qt. <0.04 <0.05 ng/mL   LACTIC ACID    Collection Time: 02/13/18  1:25 PM   Result Value Ref Range    Lactic acid 1.3 0.4 - 2.0 MMOL/L   URINALYSIS W/ RFLX MICROSCOPIC    Collection Time: 02/13/18  2:00 PM   Result Value Ref Range    Color YELLOW/STRAW      Appearance CLOUDY (A) CLEAR      Specific gravity 1.013 1.003 - 1.030      pH (UA) 5.5 5.0 - 8.0      Protein 100 (A) NEG mg/dL    Glucose NEGATIVE  NEG mg/dL    Ketone NEGATIVE  NEG mg/dL    Bilirubin NEGATIVE  NEG      Blood MODERATE (A) NEG      Urobilinogen 0.2 0.2 - 1.0 EU/dL    Nitrites POSITIVE (A) NEG      Leukocyte Esterase MODERATE (A) NEG      WBC >100 (H) 0 - 4 /hpf    RBC 10-20 0 - 5 /hpf    Epithelial cells FEW FEW /lpf    Bacteria 4+ (A) NEG /hpf    Hyaline cast 5-10 0 - 5 /lpf    UA:UC IF INDICATED URINE CULTURE ORDERED (A) CNI     EKG, 12 LEAD, INITIAL    Collection Time: 02/13/18  3:05 PM   Result Value Ref Range    Ventricular Rate 90 BPM    Atrial Rate 90 BPM    P-R Interval 146 ms    QRS Duration 82 ms    Q-T Interval 370 ms    QTC Calculation (Bezet) 452 ms    Calculated P Axis 85 degrees    Calculated R Axis 64 degrees    Calculated T Axis 62 degrees    Diagnosis       Normal sinus rhythm  Nonspecific ST and T wave abnormality  When compared with ECG of 27-AUG-2016 15:57,  Nonspecific T wave abnormality now evident in Anterolateral leads  Confirmed by Greta Manuel ([id number]) on 2/13/2018 3:15:37 PM

## 2018-02-13 NOTE — PROGRESS NOTES
HISTORY OF PRESENT ILLNESS  Tara Wallace is a 80 y.o. female. HPI  Patient of Dr. Eliud Schwarz with PMHx of colon CA, MVP, mitral regurg, and dementia here for an acute visit with CC of possible UTI. History is given by her daughter, as patient has dementia. Had a UTI about 1 month ago; was going to come in and see NP Yoan, but her daughter was concerned about the flu, so cipro was called in for her. She took 5 days worth of cipro, and symptoms seemed to improve. However, symptoms then got worse again so wanted to . Mental confusion has been much worse over this past week. Had pulled all of her clothes off. Was shaking, couldn't talk, and has been falling asleep. She has been wetting her diapers much more frequently, as they were previously just for precautions. Her daughter also called the ambulance. She lives with her daughter. Per daughter, patient has been significantly worse mental-status wise over the past week, not remembering people, routines, etc. Has been falling asleep more frequently. Her daughter gave her a xanax this morning, and she is more fatigued than usual. She put toothpaste in her perineal area yesterday, so her daughter thinks that she is symptomatic but isn't sure how to tell anyone. Per daughter, neuropsychiatrist thinks that she has Lewy body dementia. Has not done any further testing. Is not oriented typically, so A & O x 0. No fevers, but has been having \"shaking\" chills. Daughter thought that her blood sugar may have been low this morning (no hx of DM or low sugars), as she was a bit more lethargic than usual; she gave her a piece of chocolate and she perked up a bit more. No history of recent catheterization.     Past Medical History:   Diagnosis Date    Anemia     Anxiety     DDD (degenerative disc disease), cervical     Depression     Esophagitis     FH: breast cancer     FH: hypertension     Hypercholesterolemia     Hypertension     MR (mitral regurgitation)     MVP (mitral valve prolapse)     Stool color black      Past Surgical History:   Procedure Laterality Date    ABDOMEN SURGERY PROC UNLISTED  2015     and    2016    Bowel Blockage    DILATE ESOPHAGUS  6/3/2011         ENDOSCOPY, COLON, DIAGNOSTIC      due 2016    HX ABDOMINAL LAPAROSCOPY  10/2015    lap enterolysis for obstructing solitary adhesive band    HX CATARACT REMOVAL      HX GI      esoph dilatation    HX GI  08/27/2016    LAPAROTOMY EXPLORATORY, Ileocecectomy.     HX HYSTERECTOMY      HX ORTHOPAEDIC      R shoulder replacement    IN EGD TRANSORAL BIOPSY SINGLE/MULTIPLE  6/3/2011          Family History   Problem Relation Age of Onset    Cancer Mother      breast    Hypertension Mother     Diabetes Mother     Cancer Father      head and neck    Hypertension Father     Cancer Brother     Cancer Maternal Aunt      Social History     Social History    Marital status:      Spouse name: N/A    Number of children: N/A    Years of education: N/A     Social History Main Topics    Smoking status: Former Smoker     Quit date: 8/5/1960    Smokeless tobacco: Never Used    Alcohol use No    Drug use: No    Sexual activity: Not Currently     Other Topics Concern    None     Social History Narrative     Patient Active Problem List   Diagnosis Code    Hypercholesterolemia E78.00    MVP (mitral valve prolapse) I34.1    DDD (degenerative disc disease), cervical M50.30    Esophagitis 530.1    Encounter for long-term (current) use of other medications Z79.899    MR (mitral regurgitation) I34.0    Essential hypertension I10    SBO (small bowel obstruction) K56.609    Depression F32.9    Intussusception of small intestine ([address] K56.1    Intussusception, ileocecal ([address] K56.1    Malignant neoplasm of ascending colon ([address] C18.2     Outpatient Encounter Prescriptions as of 2/13/2018   Medication Sig Dispense Refill    ALPRAZolam (XANAX) 0.25 mg tablet TAKE ONE TABLET BY MOUTH TWICE DAILY AS NEEDED FOR ANXIETY. MAX OF 2 TABLETS DAILY  Indications: anxiety 60 Tab 3    gabapentin (NEURONTIN) 300 mg capsule Take 1 Cap by mouth two (2) times a day. Can increase to BID after 1 week 60 Cap 3    losartan-hydroCHLOROthiazide (HYZAAR) 100-25 mg per tablet TAKE ONE TABLET BY MOUTH ONE TIME DAILY 30 Tab 11    sertraline (ZOLOFT) 50 mg tablet 1 po bid 60 Tab 11    metoprolol succinate (TOPROL-XL) 25 mg XL tablet Take 1 Tab by mouth daily. 30 Tab 11    ACETAMINOPHEN/DIPHENHYDRAMINE (TYLENOL PM PO) Take  by mouth.  aspirin 81 mg chewable tablet Take 81 mg by mouth daily. No facility-administered encounter medications on file as of 2/13/2018. Visit Vitals    BP (!) 84/44 (BP 1 Location: Right arm)    Pulse 78    Temp 98.2 °F (36.8 °C) (Oral)    Resp 16    Ht 5' 5\" (1.651 m)    Wt 116 lb 3.2 oz (52.7 kg)    LMP  (LMP Unknown)    SpO2 95%    BMI 19.34 kg/m2           Review of Systems   Constitutional: Positive for chills and malaise/fatigue. Negative for fever. Genitourinary: Positive for frequency and urgency. Negative for dysuria and flank pain. Neurological: Positive for weakness. Negative for loss of consciousness. Psychiatric/Behavioral: Positive for memory loss. Physical Exam   Constitutional: She appears well-developed. She appears lethargic. Frail-appearing, frequently falling asleep   HENT:   Head: Normocephalic and atraumatic. Mouth/Throat: No oropharyngeal exudate. Cardiovascular: Normal rate, regular rhythm and normal heart sounds. Pulmonary/Chest: Effort normal and breath sounds normal. No respiratory distress. Abdominal: There is no CVA tenderness. Neurological: She appears lethargic. Facial symmetry   Skin: Skin is warm and dry. She is not diaphoretic. Psychiatric: Cognition and memory are impaired. Nursing note and vitals reviewed. ASSESSMENT and PLAN    ICD-10-CM ICD-9-CM    1.  Urinary frequency R35.0 788.41 AMB POC URINALYSIS DIP STICK AUTO W/ MICRO       AMB POC COMPLETE CBC,AUTOMATED ENTER   2. Hypotension, unspecified hypotension type I95.9 458.9 AMB POC COMPLETE CBC,AUTOMATED ENTER   3. Delirium R41.0 780.09 AMB POC COMPLETE CBC,AUTOMATED ENTER     With acute change in mental status, low Hgb (7.4), low WBC (2.7), hypotension (84/44), and dirty urine, send to ED for further evaluation and management with concern for sepsis. Report called to Dr. Landis Landau in the ED [facility] ED at 11:40am; daughter advised to take patient to ED immediately (declines EMS), who verbalizes understanding and agreement with this plan. Follow-up Disposition:  Return if symptoms worsen or fail to improve.    Lindie Meckel, NP

## 2018-02-13 NOTE — ED PROVIDER NOTES
EMERGENCY DEPARTMENT HISTORY AND PHYSICAL EXAM      Date: 2/13/2018  Patient Name: Anil Anderson    History of Presenting Illness     Chief Complaint   Patient presents with    Referral / Consult     pt sent by PCP for low BP, WBC of 2.2 and increased confusion, per daughter pt diagnosed with UTI PTA and PCP concerned for possible sepsis       History Provided By: Patient    HPI: Anil Anderson, 80 y.o. female with PMHx significant for HTN, breast CA, and anxiety, presents ambulatory to the ED by referral from PCP for low BP and WBC of 2.2, with cc of increased confusion since this morning. Daughter states the pt has been falling asleep more often and reports finding the patient on the floor. Pt is able to recall her name at baseline, however is not usually oriented to location. Pt takes a baby ASA. Daughter denies any tobacco, or EtOH use on behalf of the patient. PCP: Ceasar Canavan, MD    HPI and ROS limited due to change in mental status. Current Facility-Administered Medications   Medication Dose Route Frequency Provider Last Rate Last Dose    sodium chloride (NS) flush 5-10 mL  5-10 mL IntraVENous PRN Td Jha MD         Current Outpatient Prescriptions   Medication Sig Dispense Refill    IBUPROFEN/DIPHENHYDRAMINE CIT (ADVIL PM PO) Take 2 Tabs by mouth nightly.  aspirin delayed-release 81 mg tablet Take 81 mg by mouth daily.  sertraline (ZOLOFT) 50 mg tablet Take 75 mg by mouth daily.  ALPRAZolam (XANAX) 0.25 mg tablet TAKE ONE TABLET BY MOUTH TWICE DAILY AS NEEDED FOR ANXIETY. MAX OF 2 TABLETS DAILY  Indications: anxiety 60 Tab 3    gabapentin (NEURONTIN) 300 mg capsule Take 1 Cap by mouth two (2) times a day. Can increase to BID after 1 week 60 Cap 3    losartan-hydroCHLOROthiazide (HYZAAR) 100-25 mg per tablet TAKE ONE TABLET BY MOUTH ONE TIME DAILY 30 Tab 11    metoprolol succinate (TOPROL-XL) 25 mg XL tablet Take 1 Tab by mouth daily.  30 Tab 11       Past History     Past Medical History:  Past Medical History:   Diagnosis Date    Anemia     Anxiety     DDD (degenerative disc disease), cervical     Depression     Esophagitis     FH: breast cancer     FH: hypertension     Hypercholesterolemia     Hypertension     MR (mitral regurgitation)     MVP (mitral valve prolapse)     Stool color black        Past Surgical History:  Past Surgical History:   Procedure Laterality Date    ABDOMEN SURGERY PROC UNLISTED  2015     and    2016    Bowel Blockage    DILATE ESOPHAGUS  6/3/2011         ENDOSCOPY, COLON, DIAGNOSTIC      due 2016    HX ABDOMINAL LAPAROSCOPY  10/2015    lap enterolysis for obstructing solitary adhesive band    HX CATARACT REMOVAL      HX GI      esoph dilatation    HX GI  08/27/2016    LAPAROTOMY EXPLORATORY, Ileocecectomy.  HX HYSTERECTOMY      HX ORTHOPAEDIC      R shoulder replacement    MI EGD TRANSORAL BIOPSY SINGLE/MULTIPLE  6/3/2011            Family History:  Family History   Problem Relation Age of Onset    Cancer Mother      breast    Hypertension Mother     Diabetes Mother     Cancer Father      head and neck    Hypertension Father     Cancer Brother     Cancer Maternal Aunt        Social History:  Social History   Substance Use Topics    Smoking status: Former Smoker     Quit date: 8/5/1960    Smokeless tobacco: Never Used    Alcohol use No       Allergies:  No Known Allergies      Review of Systems   Review of Systems   Unable to perform ROS: Mental status change       Physical Exam   Physical Exam   Constitutional: She appears well-developed and well-nourished. No distress. Tremulous   HENT:   Head: Normocephalic and atraumatic. Eyes: EOM are normal. Pupils are equal, round, and reactive to light. Neck: Normal range of motion. Neck supple. Cardiovascular: Normal rate, regular rhythm and normal heart sounds. Pulmonary/Chest: Effort normal and breath sounds normal. No respiratory distress. She exhibits no tenderness. Abdominal: Soft. Bowel sounds are normal. She exhibits no mass. There is tenderness. Musculoskeletal: Normal range of motion. She exhibits no edema. Neurological: She is alert. Follows some commands  Speech garbled  ANO x 0   Skin: Skin is warm and dry. Psychiatric: She has a normal mood and affect. Her behavior is normal.   Nursing note and vitals reviewed. Diagnostic Study Results     Labs -     Recent Results (from the past 12 hour(s))   AMB POC URINALYSIS DIP STICK AUTO W/ MICRO     Collection Time: 02/13/18 10:30 AM   Result Value Ref Range    Color (UA POC) Yellow     Clarity (UA POC) Cloudy     Glucose (UA POC) Negative Negative    Bilirubin (UA POC) Negative Negative    Ketones (UA POC) Negative Negative    Specific gravity (UA POC) 1.015 1.001 - 1.035    Blood (UA POC) 2+ Negative    pH (UA POC) 6.0 4.6 - 8.0    Protein (UA POC) 2+ Negative    Urobilinogen (UA POC) 0.2 mg/dL 0.2 - 1    Nitrites (UA POC) Negative Negative    Leukocyte esterase (UA POC) 2+ Negative    Epithelial cells (UA POC) 2+     Mucus (UA POC) None     WBCs (UA POC) tntc     RBCs (UA POC) 5-10     Casts (UA POC) negative Negative    Crystals (UA POC) Negative Negative    Clue Cells (UA POC) NEGATIVE     Trichomonas (UA POC) Negative     Yeast (UA POC) Negative     Bacteria (UA POC) 4+ Negative    URINE CULT COMMENT (UA POC)     AMB POC COMPLETE CBC,AUTOMATED ENTER    Collection Time: 02/13/18 11:25 AM   Result Value Ref Range    WBC (POC)  K/uL    RBC (POC)  M/uL    HGB (POC)  g/dL    HCT (POC)  %    MCV (POC)  fL    MCH (POC)  pg    MCHC (POC)  g/dL    PLATELET (POC)  K/uL    ABS. LYMPHS (POC)  K/uL    LYMPHOCYTES (POC)  %    Mid # (POC)  10^3/ul    MID% POC  %    ABS.  GRANS (POC)  K/uL    GRANULOCYTES (POC)  %   CBC WITH AUTOMATED DIFF    Collection Time: 02/13/18  1:02 PM   Result Value Ref Range    WBC 2.6 (L) 3.6 - 11.0 K/uL    RBC 2.37 (L) 3.80 - 5.20 M/uL    HGB 8.3 (L) 11.5 - 16.0 g/dL    HCT 25.1 (L) 35.0 - 47.0 %    .9 (H) 80.0 - 99.0 FL    MCH 35.0 (H) 26.0 - 34.0 PG    MCHC 33.1 30.0 - 36.5 g/dL    RDW 17.1 (H) 11.5 - 14.5 %    PLATELET 271 192 - 846 K/uL    MPV 8.8 (L) 8.9 - 12.9 FL    NRBC 0.0 0  WBC    ABSOLUTE NRBC 0.00 0.00 - 0.01 K/uL    NEUTROPHILS 38 32 - 75 %    LYMPHOCYTES 55 (H) 12 - 49 %    MONOCYTES 6 5 - 13 %    EOSINOPHILS 0 0 - 7 %    BASOPHILS 1 0 - 1 %    IMMATURE GRANULOCYTES 0 0.0 - 0.5 %    ABS. NEUTROPHILS 1.0 (L) 1.8 - 8.0 K/UL    ABS. LYMPHOCYTES 1.4 0.8 - 3.5 K/UL    ABS. MONOCYTES 0.2 0.0 - 1.0 K/UL    ABS. EOSINOPHILS 0.0 0.0 - 0.4 K/UL    ABS. BASOPHILS 0.0 0.0 - 0.1 K/UL    ABS. IMM. GRANS. 0.0 0.00 - 0.04 K/UL    DF SMEAR SCANNED      RBC COMMENTS OVALOCYTES  1+        RBC COMMENTS MACROCYTOSIS  1+       METABOLIC PANEL, COMPREHENSIVE    Collection Time: 02/13/18  1:02 PM   Result Value Ref Range    Sodium 139 136 - 145 mmol/L    Potassium 3.1 (L) 3.5 - 5.1 mmol/L    Chloride 103 97 - 108 mmol/L    CO2 33 (H) 21 - 32 mmol/L    Anion gap 3 (L) 5 - 15 mmol/L    Glucose 123 (H) 65 - 100 mg/dL    BUN 24 (H) 6 - 20 MG/DL    Creatinine 1.36 (H) 0.55 - 1.02 MG/DL    BUN/Creatinine ratio 18 12 - 20      GFR est AA 45 (L) >60 ml/min/1.73m2    GFR est non-AA 37 (L) >60 ml/min/1.73m2    Calcium 8.5 8.5 - 10.1 MG/DL    Bilirubin, total 0.6 0.2 - 1.0 MG/DL    ALT (SGPT) 23 12 - 78 U/L    AST (SGOT) 39 (H) 15 - 37 U/L    Alk.  phosphatase 105 45 - 117 U/L    Protein, total 7.5 6.4 - 8.2 g/dL    Albumin 3.6 3.5 - 5.0 g/dL    Globulin 3.9 2.0 - 4.0 g/dL    A-G Ratio 0.9 (L) 1.1 - 2.2     LACTIC ACID    Collection Time: 02/13/18  1:25 PM   Result Value Ref Range    Lactic acid 1.3 0.4 - 2.0 MMOL/L   URINALYSIS W/ RFLX MICROSCOPIC    Collection Time: 02/13/18  2:00 PM   Result Value Ref Range    Color YELLOW/STRAW      Appearance CLOUDY (A) CLEAR      Specific gravity 1.013 1.003 - 1.030      pH (UA) 5.5 5.0 - 8.0      Protein 100 (A) NEG mg/dL    Glucose NEGATIVE  NEG mg/dL    Ketone NEGATIVE NEG mg/dL    Bilirubin NEGATIVE  NEG      Blood MODERATE (A) NEG      Urobilinogen 0.2 0.2 - 1.0 EU/dL    Nitrites POSITIVE (A) NEG      Leukocyte Esterase MODERATE (A) NEG      WBC >100 (H) 0 - 4 /hpf    RBC 10-20 0 - 5 /hpf    Epithelial cells FEW FEW /lpf    Bacteria 4+ (A) NEG /hpf    Hyaline cast 5-10 0 - 5 /lpf    UA:UC IF INDICATED URINE CULTURE ORDERED (A) CNI     EKG, 12 LEAD, INITIAL    Collection Time: 02/13/18  3:05 PM   Result Value Ref Range    Ventricular Rate 90 BPM    Atrial Rate 90 BPM    P-R Interval 146 ms    QRS Duration 82 ms    Q-T Interval 370 ms    QTC Calculation (Bezet) 452 ms    Calculated P Axis 85 degrees    Calculated R Axis 64 degrees    Calculated T Axis 62 degrees    Diagnosis       Normal sinus rhythm  Nonspecific ST and T wave abnormality  When compared with ECG of 27-AUG-2016 15:57,  Nonspecific T wave abnormality now evident in Anterolateral leads  Confirmed by Hunter Glover ([id number]) on 2/13/2018 3:15:37 PM         Radiologic Studies -     CT Results  (Last 48 hours)               02/13/18 1647  CT HEAD WO CONT Final result    Impression:  IMPRESSION:    1. Mild progressive ventriculomegaly likely due to volume loss. Otherwise no   acute abnormality               Narrative:  EXAM:  CT HEAD WO CONT       INDICATION:   Decreased alertness; ams       COMPARISON: 4/22/16. CONTRAST:  None. TECHNIQUE: Unenhanced CT of the head was performed using 5 mm images. Brain and   bone windows were generated. CT dose reduction was achieved through use of a   standardized protocol tailored for this examination and automatic exposure   control for dose modulation. There is motion artifact       FINDINGS:   Ventricles and cisterns are enlarged. This has progressed slightly since the   prior study but is likely due to volume loss. There is no significant white   matter disease. There is no intracranial hemorrhage, extra-axial collection,   mass, mass effect or midline shift.   The basilar cisterns are open. No acute   infarct is identified. The bone windows demonstrate no abnormalities. The   visualized portions of the paranasal sinuses and mastoid air cells are clear. CXR Results  (Last 48 hours)               02/13/18 1608  XR CHEST PORT Final result    Impression:  Impression:   1. No acute disease           Narrative:  INDICATION:  Sepsis        Exam: Portable chest 1600. Comparison: October 21, 2015. Findings: Cardiomediastinal silhouette is within normal limits. Pulmonary   vasculature is not engorged. There are no focal parenchymal opacities,   effusions, or pneumothorax. Chronic right-sided rib fractures. Right shoulder   replacement unchanged in appearance                   Medical Decision Making   I am the first provider for this patient. I reviewed the vital signs, available nursing notes, past medical history, past surgical history, family history and social history. Vital Signs-Reviewed the patient's vital signs. Patient Vitals for the past 12 hrs:   Temp Pulse Resp BP SpO2   02/13/18 1324 99.4 °F (37.4 °C) - - - -   02/13/18 1257 98.3 °F (36.8 °C) (!) 126 16 123/63 100 %     EKG interpretation: (Preliminary) 1505  Rhythm: normal sinus rhythm; and regular . Rate (approx.): 90 bpm; Axis: normal; MA interval: normal; QRS interval: normal ; ST/T wave: Nonspecific ST abnormality;   Written by Wilfred Morley ED Scribe, as dictated by Lili Lopez MD.    Records Reviewed: Nursing Notes and Old Medical Records    Provider Notes (Medical Decision Making):   DDx: sepsis, UTI, encephalopathy, CVA, dehydration    ED Course:   Initial assessment performed. The patients presenting problems have been discussed, and they are in agreement with the care plan formulated and outlined with them. I have encouraged them to ask questions as they arise throughout their visit. 5:14 PM  Patient's mentation improving. Pt is speaking more clearly.     CRITICAL CARE NOTE :  IMPENDING DETERIORATION -Cardiovascular, CNS, Metabolic and Renal  ASSOCIATED RISK FACTORS - Hypotension, Dysrhythmia, Metabolic changes, Dehydration and CNS Decompensation  MANAGEMENT- Bedside Assessment and Supervision of Care  INTERPRETATION -  Xrays, CT Scan, ECG and Blood Pressure  INTERVENTIONS - hemodynamic mngmt and Metobolic interventions  CASE REVIEW - Hospitalist, Nursing, Family and PCP  TREATMENT RESPONSE -Improved  PERFORMED BY - Self  NOTES   :  I have spent 70 minutes of critical care time involved in lab review, consultations with specialist, family decision- making, bedside attention and documentation. During this entire length of time I was immediately available to the patient . Disposition:  PLAN:  1. Admit to Hospitalist    ADMIT NOTE:  5:08 PM  Patient is being admitted to the hospital by Dr. Elio Campos. The results of their tests and reasons for their admission have been discussed with them and/or available family. They convey agreement and understanding for the need to be admitted and for their admission diagnosis. Consultation has been made with the inpatient physician specialist for hospitalization. Diagnosis     Clinical Impression:   1. Sepsis, due to unspecified organism ([address]    2. Acute cystitis with hematuria    3. Altered mental status, unspecified altered mental status type        Attestations:    ATTESTATION:  This note is prepared by Vu Collins, acting as Scribe for Doug Waterman MD.     Doug Waterman MD: The scribe's documentation has been prepared under my direction and personally reviewed by me in its entirety. I confirm that the note above accurately reflects all work, treatment, procedures, and medical decision making performed by me.

## 2018-02-13 NOTE — IP AVS SNAPSHOT
[address] [facility] 
[phone] Patient: Brown Rodriguez MRN: [id number]:[id number] About your hospitalization You were admitted on:  February 13, 2018 You last received care in the:  [facility] You were discharged on:  February 17, 2018 Why you were hospitalized Your primary diagnosis was:  Not on File Your diagnoses also included:  Uti (Urinary Tract Infection), Sepsis (Hcc), Hypotension Follow-up Information Follow up With Details Comments Contact Info Pablito Zavala NP In 1 week for post hospital followup [address] [place] [address] [id number] [phone] Discharge Orders None A check cuba indicates which time of day the medication should be taken. My Medications START taking these medications Instructions Each Dose to Equal  
 Morning Noon Evening Bedtime  
 trimethoprim-sulfamethoxazole 160-800 mg per tablet Commonly known as:  BACTRIM DS Your last dose was: Your next dose is: Take 1 Tab by mouth two (2) times a day for 10 days. 1 Tab CHANGE how you take these medications Instructions Each Dose to Equal  
 Morning Noon Evening Bedtime  
 metoprolol succinate 25 mg XL tablet Commonly known as:  TOPROL-XL What changed:  how much to take Your last dose was: Your next dose is: Take 2 Tabs by mouth daily. 50 mg  
    
   
   
   
  
 ZOLOFT 50 mg tablet Generic drug:  sertraline What changed:  Another medication with the same name was removed. Continue taking this medication, and follow the directions you see here. Your last dose was: Your next dose is: Take 75 mg by mouth daily. 75 mg CONTINUE taking these medications Instructions Each Dose to Equal  
 Morning Noon Evening Bedtime ALPRAZolam 0.25 mg tablet Commonly known as:  Temi Andrade Your last dose was: Your next dose is: TAKE ONE TABLET BY MOUTH TWICE DAILY AS NEEDED FOR ANXIETY. MAX OF 2 TABLETS DAILY  Indications: anxiety  
     
   
   
   
  
 gabapentin 300 mg capsule Commonly known as:  NEURONTIN Your last dose was: Your next dose is: Take 1 Cap by mouth two (2) times a day. Can increase to BID after 1 week 300 mg  
    
   
   
   
  
  
STOP taking these medications ADVIL PM PO  
   
  
 aspirin 81 mg chewable tablet  
   
  
 aspirin delayed-release 81 mg tablet  
   
  
 losartan-hydroCHLOROthiazide 100-25 mg per tablet Commonly known as:  HYZAAR Where to Get Your Medications Information on where to get these meds will be given to you by the nurse or doctor. ! Ask your nurse or doctor about these medications  
  metoprolol succinate 25 mg XL tablet  
 trimethoprim-sulfamethoxazole 160-800 mg per tablet Discharge Instructions HOSPITALIST DISCHARGE INSTRUCTIONS 
 
NAME: Cy Franco :  1934 MRN:  [id number] Date/Time:  2018 11:48 AM 
 
ADMIT DATE: 2018 DISCHARGE DATE: 2018 DISCHARGE DIAGNOSIS: 
Severe sepsis POA - resolved Hypotension POA- resolved 
due to EColi urinary tract infection POA - cont Bactrim x 10 more days E Coli bacteremia POA- resolved, repeat Blood Cx (2/15) remains neg x 2 days, deferred IV Abx as per the wishes of pt/mPOA daughter, Bactrim PO x 10 days to complete full 14 d Abx therapy Hypertension- now not hypotensive Paroxsymal Sinus tachycardia - likely reflex from holding metoprolol, resolved now Mild rhabdomyolysis POA- Ck improved Off IV fluids now  
Anemia- likely due to age associated BM suppression- deferred all workup by the pt's Daughter at this time Possible CKD stage III- Cr improved to 1.0 Hypokalemia- Replenished PO today Active Problems: 
  UTI (urinary tract infection) (2/13/2018) Sepsis ([address] (2/13/2018) Hypotension (2/14/2018) MEDICATIONS: 
As per medication reconciliation  list 
· It is important that you take the medication exactly as they are prescribed. · Keep your medication in the bottles provided by the pharmacist and keep a list of the medication names, dosages, and times to be taken in your wallet. · Do not take other medications without consulting your doctor. Pain Management: per above medications What to do at [facility] Recommended diet:  Cardiac Diet Recommended activity: Activity as tolerated If you have questions regarding the hospital related prescriptions or hospital related issues please call Curryskcelia Buck at . If you experience any of the following symptoms then please call your primary care physician or return to the emergency room if you cannot get hold of your doctor: 
Fever, chills, nausea, vomiting, diarrhea, change in mentation, falling, bleeding, shortness of breath, Follow Up: 
Dr. Mir Brink, NP  you are to call and set up an appointment to see them in 7-10 days. Information obtained by : 
I understand that if any problems occur once I am at home I am to contact my physician. I understand and acknowledge receipt of the instructions indicated above. Physician's or R.N.'s Signature                                                                  Date/Time Patient or Representative Signature                                                          Date/Time 
 
[employer]O Transitions of Care Introducing [employer] [address] Maxine Moura offers a voluntary care coordination program to provide high quality service and care to [facility] fee-for-service beneficiaries. Vesna Toure was designed to help you enhance your health and well-being through the following services: ? Transitions of Care  support for individuals who are transitioning from one care setting to another (example: Hospital to home). ? Chronic and Complex Care Coordination  support for individuals and caregivers of those with serious or chronic illnesses or with more than one chronic (ongoing) condition and those who take a number of different medications. If you meet specific medical criteria, a [address] [place] [address] Rd may call you directly to coordinate your care with your primary care physician and your other care providers. For questions about the [facility] programs, please, contact your physicians office. For general questions or additional information about Accountable Care Organizations: 
Please visit www.medicare.gov/acos. html or call 1-800-MEDICARE (0-677.795.8875) TTY users should call 9-442.434.4447. [employer] Announcement We are excited to announce that we are making your provider's discharge notes available to you in [employer]. You will see these notes when they are completed and signed by the physician that discharged you from your recent hospital stay. If you have any questions or concerns about any information you see in [employer]i, please call the Health Information Department where you were seen or reach out to your Primary Care Provider for more information about your plan of care. Introducing [facility] HE[facility] SERVICES! Dharmesh Small introduces [employer] patient portal. Now you can access parts of your medical record, email your doctor's office, and request medication refills online. 1. In your internet browser, go to [email or web address] 2. Click on the First Time User? Click Here link in the Sign In box. You will see the New Member Sign Up page. 3. Enter your [employer] Access Code exactly as it appears below. You will not need to use this code after youve completed the sign-up process. If you do not sign up before the expiration date, you must request a new code. · [employer] Access Code: [email or web address] Expires: 3/5/2018  9:46 AM 
 
4. Enter the last four digits of your Social Security Number (xxxx) and Date of Birth (mm/dd/yyyy) as indicated and click Submit. You will be taken to the next sign-up page. 5. Create a [employer] ID. This will be your [employer] login ID and cannot be changed, so think of one that is secure and easy to remember. 6. Create a [employer] password. You can change your password at any time. 7. Enter your Password Reset Question and Answer. This can be used at a later time if you forget your password. 8. Enter your e-mail address. You will receive e-mail notification when new information is available in [address] 9. Click Sign Up. You can now view and download portions of your medical record. 10. Click the Download Summary menu link to download a portable copy of your medical information. If you have questions, please visit the Frequently Asked Questions section of the [employer] website. Remember, [employer] is NOT to be used for urgent needs. For medical emergencies, dial 911. Now available from your iPhone and Android! Unresulted Labs-Please follow up with your PCP about these lab tests Order Current Status CULTURE, BLOOD, PAIRED Preliminary result CULTURE, BLOOD, PERIPHERAL Preliminary result Providers Seen During Your Hospitalization Provider Specialty Primary office phone Aquiles Zuleta MD Emergency Medicine [phone] Marce Ley MD Internal Medicine [phone] Your Primary Care Physician (PCP) Primary Care Physician Office Phone Office Fax Geni AMARAL [phone] You are allergic to the following No active allergies Recent Documentation Height Weight BMI OB Status Smoking Status 1.651 m 55.4 kg 20.32 kg/m2 Hysterectomy Former Smoker Emergency Contacts Name Discharge Info Relation Home Work Mobile [facility] DISCHARGE CAREGIVER [3] Child [2] [phone] Patient Belongings The following personal items are in your possession at time of discharge: 
     Visual Aid: At bedside, Glasses Please provide this summary of care documentation to your next provider. Signatures-by signing, you are acknowledging that this After Visit Summary has been reviewed with you and you have received a copy. Patient Signature:  ____________________________________________________________ Date:  ____________________________________________________________  
  
[facility] Provider Signature:  ____________________________________________________________ Date:  ____________________________________________________________

## 2018-02-13 NOTE — PROGRESS NOTES
18:20- Attempted to call for report- no answer at this time. Will try again. 18:35- TRANSFER - IN REPORT:    Verbal report received from Freddie Rosas (name) on Downey Rolling  being received from ER (unit) for routine progression of care      Report consisted of patients Situation, Background, Assessment and   Recommendations(SBAR). Information from the following report(s) SBAR, Kardex, Intake/Output, MAR and Recent Results was reviewed with the receiving nurse. Opportunity for questions and clarification was provided.

## 2018-02-14 PROBLEM — I95.9 HYPOTENSION: Status: ACTIVE | Noted: 2018-01-01

## 2018-02-14 NOTE — PROGRESS NOTES
Pt. Greg Mars more confused since admission at 1900. unable to verbalize feelings/pain and shaking continuously. When asked if she was cold, she nodded yes. More blankets were provided and temperature was taken (99 degrees). Upon admission, patients daughter expressed concerns about mother not having her scheduled xanax while in the hospital.    Dr. Roe Simmons notified and bedtime dose of xanax was prescribed. Will continue to monitor.

## 2018-02-14 NOTE — PROGRESS NOTES
Bedside shift change report given to Diane Montes De Oca RN (oncoming nurse) by Tamara Kinsey RN (offgoing nurse). Report included the following information SBAR, Kardex, Intake/Output, MAR and Cardiac Rhythm NSR.

## 2018-02-14 NOTE — PROGRESS NOTES
Primary Nurse Jose Lee RN and Isabela Be RN performed a dual skin assessment on this patient Impairment noted- blanchable redness noted on sacrum/coccyx area, and moles throughout.    Ronaldo score is 18

## 2018-02-14 NOTE — PROGRESS NOTES
Hospitalist Progress Note    NAME: Bertha Allen   :  1934   MRN:  [id number]       Assessment / Plan:  Severe sepsis POA  Hypotension POA  due to gram-negative urinary tract infection POA  Gram neg bacteremia POA    S/p IV fluids- DC later today if BP remains good  Follow Urine & Blood cx for speciation  Cont IV ceftriaxone.      Acute metabolic encephalopathy POA- close to baseline now  likely related to UTI, delirium with underlying dementia  Neuro exam is non focal   CT brain shows ventriculomegaly and no acute process.  She will need outpatient follow-up for this. Restarted Xanax prn today    Mild rhabdomyolysis POA- Ck improved  DC IV fluids later today.      Anemia- unable to determine cause at this time  Repeat CBC to confirm  No obvious source of bleeding  Check Stool for occult blood      Possible CKD stage III  Follow BMP     Hypertension  Keep holding losartan, hydrochlorothiazide and metoprolol for now           Code Status: DNR  Surrogate Decision Maker: Minoo Schwarz     DVT Prophylaxis: heparin  GI Prophylaxis: not indicated     Baseline: lives at home with DTR and semi independent at home    Recommended Disposition: Home w/Family and HH PT, OT, RN?? Subjective:     Chief Complaint / Reason for Physician Visit: F/u AMS, UTI, gram neg bacteremia  \"i am fine\". Discussed with RN events overnight. Review of Systems:  Symptom Y/N Comments  Symptom Y/N Comments   Fever/Chills    Chest Pain     Poor Appetite    Edema     Cough    Abdominal Pain     Sputum    Joint Pain     SOB/HENDERSON    Pruritis/Rash     Nausea/vomit    Tolerating PT/OT     Diarrhea    Tolerating Diet     Constipation    Other       Could NOT obtain due to: dementia     Objective:     VITALS:   Last 24hrs VS reviewed since prior progress note.  Most recent are:  Patient Vitals for the past 24 hrs:   Temp Pulse Resp BP SpO2   18 1557 100.2 °F (37.9 °C) 86 22 141/63 95 %   18 1200 97.8 °F (36.6 °C) (!) 126 24 137/72 94 %   02/14/18 0745 - - - 130/62 -   02/14/18 0725 98.4 °F (36.9 °C) 84 18 (!) 137/108 93 %   02/14/18 0255 97.8 °F (36.6 °C) (!) 57 18 126/53 94 %   02/14/18 0200 97.8 °F (36.6 °C) - - - -   02/14/18 0025 (!) 100.9 °F (38.3 °C) - - - -   02/13/18 2233 - - - 118/50 -   02/13/18 2222 98.4 °F (36.9 °C) 90 20 101/46 96 %   02/13/18 1914 99 °F (37.2 °C) 87 20 92/67 93 %   02/13/18 1815 - 94 22 108/43 92 %       Intake/Output Summary (Last 24 hours) at 02/14/18 1800  Last data filed at 02/14/18 1558   Gross per 24 hour   Intake                0 ml   Output              203 ml   Net             -203 ml        PHYSICAL EXAM:  General: WD, WN. Alert, cooperative, no acute distress    EENT:  EOMI. Anicteric sclerae. MMM  Resp:  CTA bilaterally, no wheezing or rales. No accessory muscle use  CV:  Regular  rhythm,  No edema  GI:  Soft, Non distended, Non tender.  +Bowel sounds  Neurologic:  Alert and oriented X 1/2, normal speech,   Psych:   Poor insight. little anxious but not agitated, dementia noted +  Skin:  No rashes. No jaundice    Reviewed most current lab test results and cultures  YES  Reviewed most current radiology test results   YES  Review and summation of old records today    NO  Reviewed patient's current orders and MAR    YES  PMH/SH reviewed - no change compared to H&P  ________________________________________________________________________  Care Plan discussed with:    Comments   Patient x    Family  x    RN x    Care Manager x [facility]   Consultant                        Multidiciplinary team rounds were held today with , nursing, pharmacist and clinical coordinator. Patient's plan of care was discussed; medications were reviewed and discharge planning was addressed.      ________________________________________________________________________  Total NON critical care TIME:  36   Minutes    Total CRITICAL CARE TIME Spent:   Minutes non procedure based      Comments   >50% of visit spent in counseling and coordination of care     ________________________________________________________________________  King Giles MD     Procedures: see electronic medical records for all procedures/Xrays and details which were not copied into this note but were reviewed prior to creation of Plan. LABS:  I reviewed today's most current labs and imaging studies.   Pertinent labs include:  Recent Labs      02/14/18   0056  02/13/18   1302   WBC  2.3*  2.6*   HGB  6.8*  8.3*   HCT  20.9*  25.1*   PLT  238  286     Recent Labs      02/14/18   0056  02/13/18   1302   NA  141  139   K  4.2  3.1*   CL  110*  103   CO2  25  33*   GLU  126*  123*   BUN  26*  24*   CREA  1.20*  1.36*   CA  7.8*  8.5   ALB   --   3.6   TBILI   --   0.6   SGOT   --   39*   ALT   --   23       Signed: King Giles MD

## 2018-02-14 NOTE — CDMP QUERY
Dr. Luiz Cifuentes :  Please clarify if this patient is (was) being treated/managed for:     => Acute posthemorrhagic anemia  => Other explanation of clinical findings  => Unable to determine (no explanation for clinical findings)    The medical record reflects the following clinical findings, treatment, and risk factors. Risk Factors:  79 yo female admitted for Severe Sepsis/UTI  Clinical Indicators: H&H: 8.3/25. ..6.8/20.9. . BP: 103/44;107/82;108/43. .P: 85;37;06;08;554. .. UA: Blood-moderate. Noted in the chart: She appears lethargic    Treatment: IVF NS Bolus 1,581ml IV/IVF NS Infusion@ 75ml/hr; Monitor Labs    Please clarify and document your clinical opinion in the progress notes and discharge summary including the definitive and/or presumptive diagnosis, (suspected or probable), related to the above clinical findings. Please include clinical findings supporting your diagnosis.   Thank Griselda Pino

## 2018-02-14 NOTE — PROGRESS NOTES
Problem: Mobility Impaired (Adult and Pediatric)  Goal: *Acute Goals and Plan of Care (Insert Text)  Physical Therapy Goals  Initiated 2/14/2018  1. Patient will move from supine to sit and sit to supine  in bed with supervision/set-up within 7 day(s). 2.  Patient will transfer from bed to chair and chair to bed with supervision/set-up using the least restrictive device within 7 day(s). 3.  Patient will perform sit to stand with supervision/set-up within 7 day(s). 4.  Patient will ambulate with supervision/set-up for 100 feet with the least restrictive device within 7 day(s). physical Therapy EVALUATION  Patient: Cy Franco [de-identified]80 y.o. female)  Date: 2/14/2018  Primary Diagnosis: Sepsis ([address]  UTI (urinary tract infection)        Precautions:   Fall    ASSESSMENT :  Based on the objective data described below, the patient presents with baseline dementia, generalized weakness, decreased activity tolerance, impaired balance and altered gait. Pt was received in supine with daughter at beside. Pt poor historian. Daughter stated pt was ambulating with [facility] and needed assistance for all ADLs. They have had HHPT in the past, but due to the dementia pt is not able to learn and utilize skilled therapy. She needed min A for all mobility at this time. She was able to come to the EOB. Stand with RW and ambulate around the bed with assistance to manage the walker. She was left sitting up in the chair. No questions or concerns about mobility from the daughter. She would benefit from acute therapy to mobilize safely, but due to her dementia HHPT would not be helpful. Plan to return home with 24/7 care and familiar environment. Patient will benefit from skilled intervention to address the above impairments.   Patients rehabilitation potential is considered to be Guarded  Factors which may influence rehabilitation potential include:   []         None noted  [x]         Mental ability/status  []         Medical condition  []         Home/family situation and support systems  [x]         Safety awareness  []         Pain tolerance/management  []         Other:      PLAN :  Recommendations and Planned Interventions:  [x]           Bed Mobility Training             []    Neuromuscular Re-Education  [x]           Transfer Training                   []    Orthotic/Prosthetic Training  [x]           Gait Training                         []    Modalities  [x]           Therapeutic Exercises           []    Edema Management/Control  [x]           Therapeutic Activities            [x]    Patient and Family Training/Education  []           Other (comment):    Frequency/Duration: Patient will be followed by physical therapy  3 times a week to address goals. Discharge Recommendations: Home with family and 24/7 care  Further Equipment Recommendations for Discharge: none     SUBJECTIVE:   Patient stated what happened to me.     OBJECTIVE DATA SUMMARY:   HISTORY:    Past Medical History:   Diagnosis Date    Anemia     Anxiety     DDD (degenerative disc disease), cervical     Depression     Esophagitis     FH: breast cancer     FH: hypertension     Hypercholesterolemia     Hypertension     MR (mitral regurgitation)     MVP (mitral valve prolapse)     Stool color black      Past Surgical History:   Procedure Laterality Date    ABDOMEN SURGERY PROC UNLISTED  2015     and    2016    Bowel Blockage    DILATE ESOPHAGUS  6/3/2011         ENDOSCOPY, COLON, DIAGNOSTIC      due 2016    HX ABDOMINAL LAPAROSCOPY  10/2015    lap enterolysis for obstructing solitary adhesive band    HX CATARACT REMOVAL      HX GI      esoph dilatation    HX GI  08/27/2016    LAPAROTOMY EXPLORATORY, Ileocecectomy.     HX HYSTERECTOMY      HX ORTHOPAEDIC      R shoulder replacement    [place] EGD TRANSORAL BIOPSY SINGLE/MULTIPLE  6/3/2011          Prior Level of Function/Home Situation: pt was supervision level for all mobility with SPC (inconsistent) per daughter who takes care of her. Daughter does all ADLs and with her at all times. Personal factors and/or comorbidities impacting plan of care:     Home Situation  Home Environment: Private residence  # Steps to Enter: 1  One/Two Story Residence: One story  Living Alone: No  Support Systems: Child(jayesh), Family member(s)  Patient Expects to be Discharged to[de-identified] Private residence  Current DME Used/Available at Home: Rick[employer] [place], straight, Walker, rolling  Tub or Shower Type: Shower    EXAMINATION/PRESENTATION/DECISION MAKING:   Critical Behavior:  Neurologic State: Confused, Restless  Orientation Level: Disoriented X4  Cognition: Decreased attention/concentration, Decreased command following, Impaired decision making, Memory loss, Poor safety awareness     Hearing: Auditory  Auditory Impairment: None  Skin:  intact  Edema: none  Range Of Motion:  AROM: Generally decreased, functional           PROM: Generally decreased, functional           Strength:    Strength: Generally decreased, functional                    Tone & Sensation:   Tone: Normal                              Coordination:  Coordination: Within functional limits  Vision:      Functional Mobility:  Bed Mobility:  Rolling: Minimum assistance  Supine to Sit: Minimum assistance     Scooting: Stand-by asssistance  Transfers:  Sit to Stand: Contact guard assistance;Minimum assistance  Stand to Sit: Contact guard assistance        Bed to Chair: Minimum assistance              Balance:   Sitting: Intact  Standing: Impaired  Standing - Static: Good;Constant support  Standing - Dynamic : Fair  Ambulation/Gait Training:  Distance (ft): 15 Feet (ft)  Assistive Device: Gait belt;Walker, rolling  Ambulation - Level of Assistance: Minimal assistance        Gait Abnormalities: Decreased step clearance;Shuffling gait        Base of Support: Narrowed     Speed/Constance: Pace decreased (<100 feet/min); Shuffled  Step Length: Left shortened;Right shortened Functional Measure:  Barthel Index:    Bathin  Bladder: 0  Bowels: 5  Groomin  Dressin  Feedin  Mobility: 0  Stairs: 0  Toilet Use: 5  Transfer (Bed to Chair and Back): 10  Total: 30       Barthel and G-code impairment scale:  Percentage of impairment CH  0% CI  1-19% CJ  20-39% CK  40-59% CL  60-79% CM  80-99% CN  100%   Barthel Score 0-100 100 99-80 79-60 59-40 20-39 1-19   0   Barthel Score 0-20 20 17-19 13-16 9-12 5-8 1-4 0      The Barthel ADL Index: Guidelines  1. The index should be used as a record of what a patient does, not as a record of what a patient could do. 2. The main aim is to establish degree of independence from any help, physical or verbal, however minor and for whatever reason. 3. The need for supervision renders the patient not independent. 4. A patient's performance should be established using the best available evidence. Asking the patient, friends/relatives and nurses are the usual sources, but direct observation and common sense are also important. However direct testing is not needed. 5. Usually the patient's performance over the preceding 24-48 hours is important, but occasionally longer periods will be relevant. 6. Middle categories imply that the patient supplies over 50 per cent of the effort. 7. Use of aids to be independent is allowed. Isela Juarez., Barthel, DPINA. (1063). Functional evaluation: the Barthel Index. [facility] (14)2. REGGIE Hilliard, Raffaele Gutierrez., Mela Bruno.[facility], [address] [place] [address] (). Measuring the change indisability after inpatient rehabilitation; comparison of the responsiveness of the Barthel Index and Functional [place] Measure. Journal of Neurology, Neurosurgery, and Psychiatry, 66(4), 265-015. Osito Babcock, [place]CRISTIN, STEVEN Aguillon, & Danielle Roy MKatieA. (2004.) Assessment of post-stroke quality of life in cost-effectiveness studies: The usefulness of the Barthel Index and the EuroQoL-5D.  Quality of Life Research, 13, 909-75         G codes: In compliance with CMSs Claims Based Outcome Reporting, the following G-code set was chosen for this patient based on their primary functional limitation being treated: The outcome measure chosen to determine the severity of the functional limitation was the barthel with a score of 30/100 which was correlated with the impairment scale. ? Mobility - Walking and Moving Around:     - CURRENT STATUS: CL - 60%-79% impaired, limited or restricted    - GOAL STATUS: CK - 40%-59% impaired, limited or restricted    - D/C STATUS:  ---------------To be determined---------------      Physical Therapy Evaluation Charge Determination   History Examination Presentation Decision-Making   HIGH Complexity :3+ comorbidities / personal factors will impact the outcome/ POC  MEDIUM Complexity : 3 Standardized tests and measures addressing body structure, function, activity limitation and / or participation in recreation  MEDIUM Complexity : Evolving with changing characteristics  MEDIUM Complexity : FOTO score of 26-74      Based on the above components, the patient evaluation is determined to be of the following complexity level: MEDIUM    Pain:                    Activity Tolerance:   VSS  Please refer to the flowsheet for vital signs taken during this treatment. After treatment:   [x]         Patient left in no apparent distress sitting up in chair  []         Patient left in no apparent distress in bed  [x]         Call bell left within reach  [x]         Nursing notified  []         Caregiver present  [x]         Bed alarm activated    COMMUNICATION/EDUCATION:   The patients plan of care was discussed with: Registered Nurse. [x]         Fall prevention education was provided and the patient/caregiver indicated understanding. []         Patient/family have participated as able in goal setting and plan of care.   []         Patient/family agree to work toward stated goals and plan of care. []         Patient understands intent and goals of therapy, but is neutral about his/her participation. [x]         Patient is unable to participate in goal setting and plan of care.     Thank you for this referral.  Theora Fabry, PT,DPT   Time Calculation: 18 mins

## 2018-02-14 NOTE — PROGRESS NOTES
Verbal shift change report given to Peter Ovalle RN (oncoming nurse) by Berta Batista RN   (offgoing nurse). Report included the following information SBAR, Kardex, Intake/Output, MAR and Recent Results.  (Pt not on floor at this time)

## 2018-02-15 NOTE — PROGRESS NOTES
Bedside shift change report given to David Malagon (oncoming nurse) by Isiah Betts RN (offgoing nurse). Report included the following information SBAR, Kardex and MAR.

## 2018-02-15 NOTE — PROGRESS NOTES
Bedside report given to Zackery Morrison RN. RN aware of patients complaints of nausea and recent administration of zofran. Care relinquished to Zackery Morrison, [facility] at this time.

## 2018-02-15 NOTE — PROGRESS NOTES
Problem: Falls - Risk of  Goal: *Absence of Falls  Document Alicia Fall Risk and appropriate interventions in the flowsheet.    Outcome: Progressing Towards Goal  Fall Risk Interventions:  Mobility Interventions: Bed/chair exit alarm, OT consult for ADLs, PT Consult for mobility concerns, PT Consult for assist device competence    Mentation Interventions: Bed/chair exit alarm, Door open when patient unattended, Family/sitter at bedside, More frequent rounding, Reorient patient, Room close to nurse's station    Medication Interventions: Bed/chair exit alarm    Elimination Interventions: Bed/chair exit alarm, Call light in reach, Toileting schedule/hourly rounds

## 2018-02-15 NOTE — PROGRESS NOTES
Hospitalist Progress Note    NAME: Cy Franco   :  1934   MRN:  [id number]       Assessment / Plan:  Severe sepsis POA  Hypotension POA  due to [facility] urinary tract infection POA  Gram neg bacteremia POA  Urine Cx shows EColi    S/p IVF  Follow  Blood cx for speciation  Cont IV ceftriaxone for now  Repeat Blood Cx today for clearance    Acute metabolic encephalopathy POA- close to baseline now  likely related to UTI, delirium with underlying dementia  Neuro exam is non focal   CT brain shows ventriculomegaly and no acute process.  She will need outpatient follow-up for this. Cont Xanax as at home  Resume zoloft & neurontin today    Mild rhabdomyolysis POA- Ck improved  Off IV fluids now    Anemia- Hb 7.2  Repeat CBC stable Hb >7.0  No obvious source of bleeding  Check Stool for occult blood when able      Possible CKD stage III- Cr improved to 1.0  Follow BMP     Hypertension  Keep holding losartan, hydrochlorothiazide and metoprolol for now           Code Status: DNR  Surrogate Decision Maker: Son Jermaine Brought     DVT Prophylaxis: heparin  GI Prophylaxis: not indicated     Baseline: lives at home with DTR and semi independent at home    Recommended Disposition: Home with family as per daughter's wishes- pt not rehabable, Daughter is RN     Subjective:     Chief Complaint / Reason for Physician Visit: F/u AMS, UTI, gram neg bacteremia  \"i am fine\". Discussed with RN events overnight. Review of Systems:  Symptom Y/N Comments  Symptom Y/N Comments   Fever/Chills    Chest Pain     Poor Appetite    Edema     Cough    Abdominal Pain     Sputum    Joint Pain     SOB/HENDERSON    Pruritis/Rash     Nausea/vomit    Tolerating PT/OT     Diarrhea    Tolerating Diet     Constipation    Other       Could NOT obtain due to: dementia     Objective:     VITALS:   Last 24hrs VS reviewed since prior progress note.  Most recent are:  Patient Vitals for the past 24 hrs:   Temp Pulse Resp BP SpO2   02/15/18 1643 98.3 °F (36.8 °C) (!) 111 16 148/81 96 %   02/15/18 1153 98 °F (36.7 °C) 91 17 153/84 96 %   02/15/18 0953 - 84 - - -   02/15/18 0804 97.1 °F (36.2 °C) (!) 114 - 142/82 99 %   02/15/18 0152 97.7 °F (36.5 °C) (!) 111 18 137/71 95 %   02/14/18 2226 99.4 °F (37.4 °C) (!) 116 20 153/75 95 %   02/14/18 1836 100.2 °F (37.9 °C) - - - -       Intake/Output Summary (Last 24 hours) at 02/15/18 1716  Last data filed at 02/14/18 1932   Gross per 24 hour   Intake                0 ml   Output                1 ml   Net               -1 ml        PHYSICAL EXAM:  General: WD, WN. Alert, cooperative, no acute distress    EENT:  EOMI. Anicteric sclerae. MMM  Resp:  CTA bilaterally, no wheezing or rales. No accessory muscle use  CV:  Regular  rhythm,  No edema  GI:  Soft, Non distended, Non tender.  +Bowel sounds  Neurologic:  Alert and oriented X 1/2, normal speech,   Psych:   Poor insight. little anxious but not agitated, dementia noted +  Skin:  No rashes. No jaundice    Reviewed most current lab test results and cultures  YES  Reviewed most current radiology test results   YES  Review and summation of old records today    NO  Reviewed patient's current orders and MAR    YES  PMH/ reviewed - no change compared to H&P  ________________________________________________________________________  Care Plan discussed with:    Comments   Patient x    Family  x Daughter at bedside   RN x    Care Manager x Randolph Aguilar   Consultant                        Multidiciplinary team rounds were held today with , nursing, pharmacist and clinical coordinator. Patient's plan of care was discussed; medications were reviewed and discharge planning was addressed.      ________________________________________________________________________  Total NON critical care TIME:  26   Minutes    Total CRITICAL CARE TIME Spent:   Minutes non procedure based      Comments   >50% of visit spent in counseling and coordination of care ________________________________________________________________________  King Giles MD     Procedures: see electronic medical records for all procedures/Xrays and details which were not copied into this note but were reviewed prior to creation of Plan. LABS:  I reviewed today's most current labs and imaging studies.   Pertinent labs include:  Recent Labs      02/15/18   0153  02/14/18   0056  02/13/18   1302   WBC  2.3*  2.3*  2.6*   HGB  7.2*  6.8*  8.3*   HCT  21.7*  20.9*  25.1*   PLT  208  238  286     Recent Labs      02/15/18   0153  02/14/18   0056  02/13/18   1302   NA  138  141  139   K  3.5  4.2  3.1*   CL  106  110*  103   CO2  23  25  33*   GLU  136*  126*  123*   BUN  24*  26*  24*   CREA  1.07*  1.20*  1.36*   CA  8.1*  7.8*  8.5   ALB   --    --   3.6   TBILI   --    --   0.6   SGOT   --    --   39*   ALT   --    --   23       Signed: King Giles MD

## 2018-02-16 NOTE — PROGRESS NOTES
Pt admitted with UTI. P lives with dtr, has dementia, DNR, Medicare//BC, needs assist with adls, has a cane, bsc, and MD CECILY anticipates d/c to home with po antibiotics. Goes to [facility] in [place]. 3p  I spoke with dtr in room. She states pt has lived with she//spouse x3 years; she would like DDNR completed prior to d/c; declines [facility] as pt can not remember anything taught; and anticipates d/c to home tomorrow. She will transport. I placed blank DDNR on paper chart for MD  1530  DDNR completed. Dtr given original and 5 copies//1 copy on pt's clipboard      Care Management Interventions  PCP Verified by CM:  Yes  Mode of Transport at Discharge: Self  Transition of Care Consult (CM Consult): Discharge Planning  MyChart Signup: No  Discharge Durable Medical Equipment: Yes  Health Maintenance Reviewed: Yes  Physical Therapy Consult: Yes  Occupational Therapy Consult: No  Speech Therapy Consult: No  Current Support Network: Family Lives Nearby, Lives with Caregiver  Confirm Follow Up Transport: Family  Plan discussed with Pt/Family/Caregiver: Yes  Freedom of Choice Offered: Yes  Discharge Location  Discharge Placement: Home

## 2018-02-16 NOTE — PROGRESS NOTES
Pt heart rhythm converted from NSR to Atrial escape, increasing to low 100's then decreasing to 30s-50s, then back to NSR; paged Dr. Elias Orozco and informed him of heart rhythm change and reviewed current medications; Metoprolol adjusted per Dr. Elias Orozco.

## 2018-02-16 NOTE — PROGRESS NOTES
Spiritual Care Partner Volunteer visited patient in [address] on 2/15/18. Documented by:  REBEKAH Conley

## 2018-02-16 NOTE — PROGRESS NOTES
Bedside and Verbal shift change report given to David Gross (oncoming nurse) by Katherine Perez (offgoing nurse). Report included the following information SBAR, Kardex, Intake/Output, MAR and Recent Results. Zone Phone for oncoming shift:   [phone]    Shift Summary: Patient rested quietly throughout the night. No C/O pain voiced. PRN Xanax given for patient's anxiety.      LDAs               Peripheral IV Right Antecubital (Active)   Site Assessment Clean, dry, & intact 2/16/2018  3:18 AM   Phlebitis Assessment 0 2/16/2018  3:18 AM   Infiltration Assessment 0 2/16/2018  3:18 AM   Dressing Status Clean, dry, & intact 2/16/2018  3:18 AM   Dressing Type Tape;Transparent 2/16/2018  3:18 AM   Hub Color/Line Status Pink;Flushed 2/16/2018  3:18 AM                        Intake & Output     Last Bowel Movement Last Bowel Movement Date: 02/14/18   Glucose Checks [x] N/A  [] AC/HS  [] Q6  Concerns:   Nutrition Active Orders   Diet    DIET CARDIAC Regular       Consults [x]PT  [x]OT  []Speech  []Case Management   Cardiac Monitoring []N/A [x]Yes Expires: 48 hours

## 2018-02-17 NOTE — DISCHARGE SUMMARY
Hospitalist Discharge Summary     Patient ID:  Tao Ng  [id number]  80 y.o.  1934    PCP on record:     Admit date: 2/13/2018  Discharge date and time: 2/17/2018      DISCHARGE DIAGNOSIS:    Severe sepsis POA - resolved  Hypotension POA- resolved  due to [facility] urinary tract infection POA - cont Bactrim x 10 more days  E Coli bacteremia POA- resolved, repeat Blood Cx (2/15) remains neg x 2 days, deferred IV Abx as per the wishes of pt/mPOA daughter, Bactrim PO x 10 days to complete full 14 d Abx therapy  Hypertension- now not hypotensive  Paroxsymal Sinus tachycardia - likely reflex from holding metoprolol, resolved now  Mild rhabdomyolysis POA- Ck improved  Off IV fluids now   Anemia- likely due to age associated BM suppression- deferred all workup by the pt's Daughter at this time  Possible CKD stage III- Cr improved to 1.0  Hypokalemia- Replenished PO today    CONSULTATIONS:    None    Excerpted HPI from H&P of Evens Naik MD:  Vanita Short female with past medical history of hypertension, breast cancer presented to the ED after she was sent from her PCPs office for low blood pressure and also suspected urinary tract infection.  Patient is currently confused and cannot provide any information secondary to dementia so information is obtained by reviewing the records and also talking to patient's daughter. Cristóbal Gunter at baseline lives at home with her daughter and is semi-independent on activities of daily living. Shade Ar the last 2 days she is acting more confused than her normal.  She also had borderline fever along with chills.  She also had increased frequency of urination.  She also has been sleeping more than her normal.  She was recently treated with ciprofloxacin for 5 days for urinary tract infection.  She went to her PCPs office with above complaints and was also borderline hypotensive along with borderline fever for which she was referred to emergency department for further evaluation. On arrival to the hospital, patient was noted to have borderline fever but her blood pressure was stable.  She was given IV fluids for sepsis protocol and also ceftriaxone and urine cultures have been sent.     We were asked to admit for work up and evaluation of the above problems. \"    ______________________________________________________________________  DISCHARGE SUMMARY/HOSPITAL COURSE:  for full details see H&P, daily progress notes, labs, consult notes. Severe sepsis POA - resolved  Hypotension POA- resolved  due to [facility] urinary tract infection POA  E Coli bacteremia POA  Urine Cx & initial Blood Cx shows EColi- pan sensitive  Repeat Blood Cx (2/15) remains neg x 17 hrs     Off IVF    Cont IV ceftriaxone for now  Follow repeat Blood Cx for clearance- if remains neg till tomorrow, will DC home on PO Abx (Pt's daughter not interested in IV ABx at home- will be conservative at her age- will honor the wishes)     Hypertension- now not hypotensive  Paroxsymal Sinus tachycardia - likely reflex from holding metoprolol  Keep holding losartan, hydrochlorothiazide     Resumed metoprolol yesterday -increase the dose to 50 mg daily (takes 25 mg at home)    Acute metabolic encephalopathy POA- close to baseline now  likely related to UTI, delirium with underlying dementia  Neuro exam is non focal   CT brain shows ventriculomegaly and no acute process.  She will need outpatient follow-up for this. Cont Xanax as at home  Resumed zoloft & neurontin  Sitter prn, ? Haldol prn if too agitated at nights/sundowning    Mild rhabdomyolysis POA- Ck improved  Off IV fluids now   Anemia- Hb 7.2  Repeat CBC stable Hb >7.0  No obvious source of bleeding  Check Stool for occult blood when able       Possible CKD stage III- Cr improved to 1.0  Follow BMP                  Code Status: DNR, will DC telemetry today  Surrogate Decision Maker: Jordan Choe      DVT Prophylaxis: heparin  GI Prophylaxis: not indicated      Baseline: lives at home with DTR and semi independent at home        _______________________________________________________________________  Patient seen and examined by me on discharge day. Pertinent Findings:  Gen:    Not in distress  Chest: Clear lungs  CVS:   Regular rhythm. No edema  Abd:  Soft, not distended, not tender  Neuro:  Alert, oriented x 1, Dementia noted +  _______________________________________________________________________  DISCHARGE MEDICATIONS:   Current Discharge Medication List      START taking these medications    Details   trimethoprim-sulfamethoxazole (BACTRIM DS) 160-800 mg per tablet Take 1 Tab by mouth two (2) times a day for 10 days. Qty: 20 Tab, Refills: 0         CONTINUE these medications which have CHANGED    Details   metoprolol succinate (TOPROL-XL) 25 mg XL tablet Take 2 Tabs by mouth daily. Qty: 30 Tab, Refills: 11    Associated Diagnoses: Essential hypertension with goal blood pressure less than 140/90         CONTINUE these medications which have NOT CHANGED    Details   sertraline (ZOLOFT) 50 mg tablet Take 75 mg by mouth daily. ALPRAZolam (XANAX) 0.25 mg tablet TAKE ONE TABLET BY MOUTH TWICE DAILY AS NEEDED FOR ANXIETY. MAX OF 2 TABLETS DAILY  Indications: anxiety  Qty: 60 Tab, Refills: 3    Associated Diagnoses: Depression, unspecified depression type      gabapentin (NEURONTIN) 300 mg capsule Take 1 Cap by mouth two (2) times a day.  Can increase to BID after 1 week  Qty: 60 Cap, Refills: 3    Associated Diagnoses: Burning sensation in lower extremity         STOP taking these medications       IBUPROFEN/DIPHENHYDRAMINE CIT (ADVIL PM PO) Comments:   Reason for Stopping:         aspirin delayed-release 81 mg tablet Comments:   Reason for Stopping:         losartan-hydroCHLOROthiazide (HYZAAR) 100-25 mg per tablet Comments:   Reason for Stopping:         aspirin 81 mg chewable tablet Comments:   Reason for Stopping:               My Recommended Diet, Activity, Wound Care, and follow-up labs are listed in the patient's Discharge Insturctions which I have personally completed and reviewed.     ______________________________________________________________________    Risk of deterioration: Low    Condition at Discharge:  Stable  ______________________________________________________________________    Disposition  Home with family, no needs  ______________________________________________________________________    Care Plan discussed with:   Patient, Family, RN    ______________________________________________________________________    Code Status: DNR/DNI  ______________________________________________________________________      Follow up with:   PCP : Ayse Good MD  Follow-up Information     Follow up With Details Comments Contact Info    Ayse Good MD   [address] [place] [address]  [phone]                Total time in minutes spent coordinating this discharge (includes going over instructions, follow-up, prescriptions, and preparing report for sign off to her PCP) :  36 minutes    Signed:  Josefina Chavarria MD

## 2018-02-17 NOTE — DISCHARGE INSTRUCTIONS
HOSPITALIST DISCHARGE INSTRUCTIONS    NAME: Maikel Click   :  1934   MRN:  [id number]     Date/Time:  2018 11:48 AM    ADMIT DATE: 2018     DISCHARGE DATE: 2018     DISCHARGE DIAGNOSIS:  Severe sepsis POA - resolved  Hypotension POA- resolved  due to EColi urinary tract infection POA - cont Bactrim x 10 more days  E Coli bacteremia POA- resolved, repeat Blood Cx (2/15) remains neg x 2 days, deferred IV Abx as per the wishes of pt/mPOA daughter, Bactrim PO x 10 days to complete full 14 d Abx therapy  Hypertension- now not hypotensive  Paroxsymal Sinus tachycardia - likely reflex from holding metoprolol, resolved now  Mild rhabdomyolysis POA- Ck improved  Off IV fluids now   Anemia- likely due to age associated BM suppression- deferred all workup by the pt's Daughter at this time  Possible CKD stage III- Cr improved to 1.0  Hypokalemia- Replenished PO today    Active Problems:    UTI (urinary tract infection) (2018)      Sepsis ([address] (2018)      Hypotension (2018)         MEDICATIONS:  As per medication reconciliation  list  · It is important that you take the medication exactly as they are prescribed. · Keep your medication in the bottles provided by the pharmacist and keep a list of the medication names, dosages, and times to be taken in your wallet. · Do not take other medications without consulting your doctor. Pain Management: per above medications    What to do at Home    Recommended diet:  Cardiac Diet    Recommended activity: Activity as tolerated    If you have questions regarding the hospital related prescriptions or hospital related issues please call [facility] at [phone].     If you experience any of the following symptoms then please call your primary care physician or return to the emergency room if you cannot get hold of your doctor:  Fever, chills, nausea, vomiting, diarrhea, change in mentation, falling, bleeding, shortness of breath,     Follow Up:  Dr. Beverly Talavera, NP  you are to call and set up an appointment to see them in 7-10 days. Information obtained by :  I understand that if any problems occur once I am at home I am to contact my physician. I understand and acknowledge receipt of the instructions indicated above.                                                                                                                                            Physician's or R.N.'s Signature                                                                  Date/Time                                                                                                                                              Patient or Representative Signature                                                          Date/Time

## 2018-02-17 NOTE — PROGRESS NOTES
Discharge order noted by CM. Chart reviewed for CM assessment and needs. No call from MD for CM needs - chart indicates family has refused HH due to patient's dementia and they have all DME needed. DDNR has been completed and daughter has copies for home.     Italo Montgomery, RN, BSN, ACM   - Medical Oncology  [phone]

## 2018-02-17 NOTE — PROGRESS NOTES
Verbal shift change report given to Adina RN (oncoming nurse) by Jenny Raya RN (offgoing nurse). Report included the following information SBAR, Kardex, Intake/Output, MAR, Recent Results and Cardiac Rhythm NSR.

## 2018-02-17 NOTE — PROGRESS NOTES
Verbal shift change report given to Long Castanon RN (oncoming nurse) by David Moore RN and Delicia Hernandez RN (offgoing nurse). Report included the following information SBAR, Kardex, MAR and Recent Results.

## 2018-02-27 NOTE — PROGRESS NOTES
HISTORY OF PRESENT ILLNESS  Corby Becker is a 80 y.o. female. HPI  Patient comes in today for hospital follow up. Was in ED [facility] 2/13-2/17/18 for sepsis due to UTI, hypotension. Hospital records reviewed. Antibiotic switched to Keflex due to having blisters with Bactrim. Patient finished keflex yesterday. Patient comes in with daughter, Tyrone Pretty, who is also caregiver. Patient's daughter states she is not same person. Does not talk most of time, will mumble. Patient has fallen multiple times - daughter states she falls 2-3 times daily, will find her at night on floor. Complains of pain on left side of rib cage, unsure if she has fractured anything due to falls. Received PT once in hospital.  Did not go home with PT. Daughter states she is not eating. Patient is having some incontinence due to inability to move well and fast.  Seems to have a little better control over incontinence. [facility].   Admit date: 2/13/2018  Discharge date and time: 2/17/2018   DISCHARGE DIAGNOSIS:  Severe sepsis POA - resolved  Hypotension POA- resolved  due to EColi urinary tract infection POA - cont Bactrim x 10 more days  E Coli bacteremia POA- resolved, repeat Blood Cx (2/15) remains neg x 2 days, deferred IV Abx as per the wishes of pt/mPOA daughter, Bactrim PO x 10 days to complete full 14 d Abx therapy  Hypertension- now not hypotensive  Paroxsymal Sinus tachycardia - likely reflex from holding metoprolol, resolved now  Mild rhabdomyolysis POA- Ck improved  Off IV fluids now   Anemia- likely due to age associated BM suppression- deferred all workup by the pt's Daughter at this time  Possible CKD stage III- Cr improved to 1.0  Hypokalemia- Replenished PO today  Allergies   Allergen Reactions    Bactrim [Sulfamethoxazole-Trimethoprim] Rash       Past Medical History:   Diagnosis Date    Anemia     Anxiety     DDD (degenerative disc disease), cervical     Depression     Esophagitis     FH: breast cancer     FH: hypertension     Hypercholesterolemia     Hypertension     MR (mitral regurgitation)     MVP (mitral valve prolapse)     Stool color black        Past Surgical History:   Procedure Laterality Date    ABDOMEN SURGERY PROC UNLISTED  2015     and    2016    Bowel Blockage    DILATE ESOPHAGUS  6/3/2011         ENDOSCOPY, COLON, DIAGNOSTIC      due 2016    HX ABDOMINAL LAPAROSCOPY  10/2015    lap enterolysis for obstructing solitary adhesive band    HX CATARACT REMOVAL      HX GI      esoph dilatation    HX GI  08/27/2016    LAPAROTOMY EXPLORATORY, Ileocecectomy.  HX HYSTERECTOMY      HX ORTHOPAEDIC      R shoulder replacement    CO EGD TRANSORAL BIOPSY SINGLE/MULTIPLE  6/3/2011            Social History     Social History    Marital status:      Spouse name: N/A    Number of children: N/A    Years of education: N/A     Occupational History    Not on file. Social History Main Topics    Smoking status: Former Smoker     Quit date: 8/5/1960    Smokeless tobacco: Never Used    Alcohol use No    Drug use: No    Sexual activity: Not Currently     Other Topics Concern    Not on file     Social History Narrative       Family History   Problem Relation Age of Onset    Cancer Mother      breast    Hypertension Mother     Diabetes Mother     Cancer Father      head and neck    Hypertension Father     Cancer Brother     Cancer Maternal Aunt        Current Outpatient Prescriptions   Medication Sig    metoprolol succinate (TOPROL-XL) 25 mg XL tablet Take 2 Tabs by mouth daily.  sertraline (ZOLOFT) 50 mg tablet Take 75 mg by mouth daily.  ALPRAZolam (XANAX) 0.25 mg tablet TAKE ONE TABLET BY MOUTH TWICE DAILY AS NEEDED FOR ANXIETY. MAX OF 2 TABLETS DAILY  Indications: anxiety    gabapentin (NEURONTIN) 300 mg capsule Take 1 Cap by mouth two (2) times a day. Can increase to BID after 1 week     No current facility-administered medications for this visit.       Review of Systems Constitutional: Negative for chills and fever. Respiratory: Positive for cough (no change from normal per daughter). Negative for shortness of breath. Cardiovascular: Negative for chest pain and palpitations. Gastrointestinal: Negative for abdominal pain, nausea and vomiting. No appetite   Genitourinary: Positive for frequency (with some incontinence). Negative for dysuria and urgency. Psychiatric/Behavioral:        More confusion, difficulty walking, difficulty speaking at times     Vitals:    02/27/18 1103   BP: 111/40   Pulse: 61   Resp: 16   Temp: 95.5 °F (35.3 °C)   TempSrc: Oral   SpO2: 94%   Weight: 111 lb 9.6 oz (50.6 kg)   Height: 5' 5\" (1.651 m)     Physical Exam   Constitutional: She is oriented to person, place, and time. She appears well-developed and well-nourished. Cardiovascular: Normal rate, regular rhythm and normal heart sounds. Pulses:       Dorsalis pedis pulses are 2+ on the right side, and 2+ on the left side. No edema noted   Pulmonary/Chest: Effort normal and breath sounds normal.   Abdominal: Soft. There is no tenderness. Neurological: She is alert and oriented to person, place, and time. Psychiatric: Her speech is normal and behavior is normal. Thought content normal. Cognition and memory are impaired. She exhibits abnormal recent memory and abnormal remote memory. Difficulty following commands, staring around room   Vitals reviewed. ASSESSMENT and PLAN    ICD-10-CM ICD-9-CM    1. Hospital discharge follow-up Z09 V67.59    2. Urinary tract infection without hematuria, site unspecified N39.0 599.0 AMB POC URINALYSIS DIP STICK AUTO W/ MICRO       REFERRAL TO HOME HEALTH   3. Anemia, unspecified type D64.9 285.9 AMB POC COMPLETE CBC,AUTOMATED ENTER      METABOLIC PANEL, COMPREHENSIVE   4. Essential hypertension with goal blood pressure less than 140/90 I10 401.9 metoprolol succinate (TOPROL-XL) 50 mg XL tablet   5.  Lewy body dementia without behavioral disturbance G31.83 331.82 REFERRAL TO HOME HEALTH    F02.80 294.10    6. Physical deconditioning R53.81 799.3 REFERRAL TO HOME HEALTH     Encounter Diagnoses   Name Primary? [facility] discharge follow-up Yes    Urinary tract infection without hematuria, site unspecified     Anemia, unspecified type     Essential hypertension with goal blood pressure less than 140/90     Lewy body dementia without behavioral disturbance     Physical deconditioning      Orders Placed This Encounter    METABOLIC PANEL, COMPREHENSIVE    REFERRAL TO HOME HEALTH    AMB POC COMPLETE CBC,AUTOMATED ENTER    AMB POC URINALYSIS DIP STICK AUTO W/ MICRO     metoprolol succinate (TOPROL-XL) 50 mg XL tablet     Diagnoses and all orders for this visit:    1. Hospital discharge follow-up    2. Urinary tract infection without hematuria, site unspecified - patient finished Keflec yesterday. Will repeat urine  -     AMB POC URINALYSIS DIP STICK AUTO W/ MICRO   -     REFERRAL TO HOME HEALTH    3. Anemia, unspecified type - hgb 6.7 on discharge. Today 7.2, slowly improving  -     AMB POC COMPLETE CBC,AUTOMATED ENTER  -     METABOLIC PANEL, COMPREHENSIVE    4. Essential hypertension with goal blood pressure less than 140/90  -     metoprolol succinate (TOPROL-XL) 50 mg XL tablet; Take 1 Tab by mouth daily. 5. Lewy body dementia without behavioral disturbance - home health referral for dementia program, nursing and PT. Will need assistance in obtaining hospital bed, bed alarm  -     [facility]    6. Physical deconditioning  -     REFERRAL TO HOME HEALTH  -     Needs BSC as she is increased risk of falls with multiple falls recently, hx of dementia, hx UTI with urinary incontinence. Follow-up Disposition:  Return in about 3 months (around 5/27/2018), or if symptoms worsen or fail to improve. I have reviewed the patient's allergies and made any necessary changes.  Medical, procedural, social and family histories have been reviewed and updated as medically indicated. I have reconciled and/or revised patient medications in the EMR. I have discussed each diagnosis listed in this note with Marky Das and/or their family. I have discussed treatment options and the risk/benefit analysis of those options, including safe use of medications and possible medication side effects. Through the use of shared decision making we have agreed to the above plan. The patient has received an after-visit summary and questions were answered concerning future plans. Ingrid Milton, CLARISSEP-C    This note will not be viewable in [employer].

## 2018-02-27 NOTE — PROGRESS NOTES
Chief Complaint   Patient presents with   [facility] Follow Up     UTI   Daughter states that since she was discharged, pt has been different. She walks at times but is now confining self to wheelchair and constantly falling in the floor. She is not eating or talking. Fighting. Being aggressive. Would pt qualify for home care? Daughter request a hospital bed with rails and a bedside commode. Daughter feels Pt may need an xray of the left side of rib cage. Pt points to the area as if in pain. Daughter would like to have an xray completed.

## 2018-02-27 NOTE — MR AVS SNAPSHOT
[address] [facility], operated by [facility] 
 
 
 [address] [facility] [place] [address] [address] [phone] Patient: Angela Ashley MRN: [id number] Visit Information Date & Time Provider Department Dept. Phone Encounter #  
 2/27/2018 11:00 AM Eduardo Mckeon NP [facility] [phone] [id number] Follow-up Instructions Return in about 3 months (around 5/27/2018), or if symptoms worsen or fail to improve. Upcoming Health Maintenance Date Due  
 GLAUCOMA SCREENING Q2Y 5/13/2016 Pneumococcal 65+ High/Highest Risk (2 of 2 - PPSV23) 9/15/2016 MEDICARE YEARLY EXAM 8/1/2018 DTaP/Tdap/Td series (2 - Td) 7/31/2027 Allergies as of 2/27/2018  Review Complete On: 2/27/2018 By: Soni Pearce LPN Severity Noted Reaction Type Reactions Bactrim [Sulfamethoxazole-trimethoprim] High 02/19/2018   Topical Rash Current Immunizations  Reviewed on 10/18/2015 Name Date Influenza High Dose Vaccine PF 9/15/2017  9:57 AM, 1/28/2015 Influenza Vaccine 10/3/2013 Influenza Vaccine (Quad) PF 10/18/2015  8:45 AM  
 Influenza Vaccine Split 8/31/2012, 9/15/2011 Tdap 7/31/2017 12:11 PM  
 ZZZ-RETIRED (DO NOT USE) Pneumococcal Vaccine (Unspecified Type) 9/15/2011 Not reviewed this visit You Were Diagnosed With   
  
 Codes Comments Urinary tract infection without hematuria, site unspecified    -  Primary ICD-10-CM: N39.0 ICD-9-CM: 599.0 Essential hypertension with goal blood pressure less than 140/90     ICD-10-CM: I10 
ICD-9-CM: 401.9 Anemia, unspecified type     ICD-10-CM: D64.9 ICD-9-CM: 003. 9 Vitals BP Pulse Temp Resp Height(growth percentile) Weight(growth percentile) 111/40 (BP 1 Location: Right arm, BP Patient Position: Sitting) 61 95.5 °F (35.3 °C) (Oral) 16 5' 5\" (1.651 m) 111 lb 9.6 oz (50.6 kg) LMP SpO2 BMI OB Status Smoking Status (LMP Unknown) 94% 18.57 kg/m2 Hysterectomy Former Smoker Vitals History BMI and BSA Data Body Mass Index Body Surface Area 18.57 kg/m 2 1.52 m 2 Preferred Pharmacy Pharmacy Name Phone [id number] Kanga[address]ne jacques [phone] Your Updated Medication List  
  
   
This list is accurate as of 2/27/18 11:48 AM.  Always use your most recent med list.  
  
  
  
  
 ALPRAZolam 0.25 mg tablet Commonly known as:  XANAX  
TAKE ONE TABLET BY MOUTH TWICE DAILY AS NEEDED FOR ANXIETY. MAX OF 2 TABLETS DAILY  Indications: anxiety  
  
 gabapentin 300 mg capsule Commonly known as:  NEURONTIN Take 1 Cap by mouth two (2) times a day. Can increase to BID after 1 week  
  
 metoprolol succinate 50 mg XL tablet Commonly known as:  TOPROL-XL Take 1 Tab by mouth daily. ZOLOFT 50 mg tablet Generic drug:  sertraline Take 75 mg by mouth daily. Prescriptions Sent to Pharmacy Refills  
 metoprolol succinate (TOPROL-XL) 50 mg XL tablet 5 Sig: Take 1 Tab by mouth daily. Class: Normal  
 Pharmacy: [facility] #[id number] - Doc Adrianne Kelly Ph #: [phone] Route: Oral  
  
We Performed the Following AMB POC COMPLETE CBC,AUTOMATED ENTER [id number] CPT(R)] AMB POC URINALYSIS DIP STICK AUTO W/ MICRO  [34683 CPT(R)] METABOLIC PANEL, COMPREHENSIVE [13464 CPT(R)] Follow-up Instructions Return in about 3 months (around 5/27/2018), or if symptoms worsen or fail to improve. Introducing [facility]! Partha May introduces Ziippi patient portal. Now you can access parts of your medical record, email your doctor's office, and request medication refills online. 1. In your internet browser, go to [email or web address] 2. Click on the First Time User? Click Here link in the Sign In box. You will see the New Member Sign Up page. 3. Enter your Ziippi Access Code exactly as it appears below.  You will not need to use this code after youve completed the sign-up process. If you do not sign up before the expiration date, you must request a new code. · SitScape Access Code: [email or web address] Expires: 3/5/2018  9:46 AM 
 
4. Enter the last four digits of your Social Security Number (xxxx) and Date of Birth (mm/dd/yyyy) as indicated and click Submit. You will be taken to the next sign-up page. 5. Create a SitScape ID. This will be your SitScape login ID and cannot be changed, so think of one that is secure and easy to remember. 6. Create a SitScape password. You can change your password at any time. 7. Enter your Password Reset Question and Answer. This can be used at a later time if you forget your password. 8. Enter your e-mail address. You will receive e-mail notification when new information is available in [address]. 9. Click Sign Up. You can now view and download portions of your medical record. 10. Click the Download Summary menu link to download a portable copy of your medical information. If you have questions, please visit the Frequently Asked Questions section of the SitScape website. Remember, SitScape is NOT to be used for urgent needs. For medical emergencies, dial 911. Now available from your iPhone and Android! Please provide this summary of care documentation to your next provider. Your primary care clinician is listed as Julián Ledesma. If you have any questions after today's visit, please call [phone].

## 2018-03-20 NOTE — ED PROVIDER NOTES
EMERGENCY DEPARTMENT HISTORY AND PHYSICAL EXAM      I have seen and evaluated this patient in the Express Care portion of triage for left leg swelling x 1 week. Pt had out patient ultrasound performed today that was significant for extensive left DVT. Pt has a history of dementia. The patient's care will begin now and orders have been placed. This patient will be seen and provided further care in the Emergency Room. Written by luke Hassan scribe for [facility]. Date: 3/20/2018  Patient Name: Adriel Espinoza    History of Presenting Illness     Chief Complaint   Patient presents with    Leg Swelling     pt sent by PCP for confirmed L DVT       History Provided By: Patient's Daughter    HPI: Adriel Espinoza, 80 y.o. female with PMHx significant for HTN, MVP, DDD, MR, depression, anxiety, anemia, presents via wheelchair to the ED for evaluation of diffuse LLE swelling x 1 week. Per pt's daughter, she was seen by her PCP today for this problem and sent to [place] at [address] for r/o blood clot. However, when there, an extensive L common femoral DVT was found and she was sent to the ED for evaluation. Of note, pt has a hx of anemia with her most recent HBG being 7. The cause is unknown, but pt is no longer on any NSAIDs or ASA. Pt does live with her daughter who closely monitors her for falls. There are no other complaints, changes, or physical findings at this time. HPI is limited due to pt's dementia    PCP: Ken Burger NP    Current Outpatient Prescriptions   Medication Sig Dispense Refill    rivaroxaban (XARELTO) 15 mg (42)- 20 mg (9) DsPk Take one 15 mg tablet twice a day with food for the first 21 days. Then, take one 20 mg tablet once a day with food for 9 days. 1 Dose Pack 0    metoprolol succinate (TOPROL-XL) 50 mg XL tablet Take 1 Tab by mouth daily. 30 Tab 5    sertraline (ZOLOFT) 50 mg tablet Take 75 mg by mouth daily.       ALPRAZolam (XANAX) 0.25 mg tablet TAKE ONE TABLET BY MOUTH TWICE DAILY AS NEEDED FOR ANXIETY. MAX OF 2 TABLETS DAILY  Indications: anxiety 60 Tab 3       Past History     Past Medical History:  Past Medical History:   Diagnosis Date    Anemia     Anxiety     DDD (degenerative disc disease), cervical     Depression     Esophagitis     FH: breast cancer     FH: hypertension     Hypercholesterolemia     Hypertension     MR (mitral regurgitation)     MVP (mitral valve prolapse)     Stool color black        Past Surgical History:  Past Surgical History:   Procedure Laterality Date    ABDOMEN SURGERY PROC UNLISTED  2015     and    2016    Bowel Blockage    DILATE ESOPHAGUS  6/3/2011         ENDOSCOPY, COLON, DIAGNOSTIC      due 2016    HX ABDOMINAL LAPAROSCOPY  10/2015    lap enterolysis for obstructing solitary adhesive band    HX CATARACT REMOVAL      HX GI      esoph dilatation    HX GI  08/27/2016    LAPAROTOMY EXPLORATORY, Ileocecectomy.  HX HYSTERECTOMY      HX ORTHOPAEDIC      R shoulder replacement    AL EGD TRANSORAL BIOPSY SINGLE/MULTIPLE  6/3/2011            Family History:  Family History   Problem Relation Age of Onset    Cancer Mother      breast    Hypertension Mother     Diabetes Mother     Cancer Father      head and neck    Hypertension Father     Cancer Brother     Cancer Maternal Aunt        Social History:  Social History   Substance Use Topics    Smoking status: Former Smoker     Quit date: 8/5/1960    Smokeless tobacco: Never Used    Alcohol use No       Allergies: Allergies   Allergen Reactions    Bactrim [Sulfamethoxazole-Trimethoprim] Rash       Review of Systems   Review of Systems   Unable to perform ROS: Dementia       Physical Exam   Physical Exam   Constitutional: She is oriented to person, place, and time. She appears well-developed and well-nourished. No distress. HENT:   Head: Normocephalic. Nose: Nose normal.   Mouth/Throat: Oropharynx is clear and moist. No oropharyngeal exudate.    Eyes: Conjunctivae are normal. Pupils are equal, round, and reactive to light. No scleral icterus. Neck: Normal range of motion. Neck supple. No JVD present. No tracheal deviation present. No thyromegaly present. Cardiovascular: Normal rate, regular rhythm and intact distal pulses. Exam reveals no gallop and no friction rub. No murmur heard. Pulses:       Dorsalis pedis pulses are 2+ on the left side. Posterior tibial pulses are 2+ on the left side. Pulmonary/Chest: Effort normal and breath sounds normal. No stridor. No respiratory distress. She has no wheezes. She has no rales. Abdominal: Soft. Bowel sounds are normal. She exhibits no distension. There is no tenderness. There is no rebound and no guarding. Musculoskeletal: Normal range of motion. She exhibits edema (moderate diffuse LLE swelling with 2+ pitting edema up to the knee). Toes up/down   Lymphadenopathy:     She has no cervical adenopathy. Neurological: She is alert and oriented to person, place, and time. No cranial nerve deficit. She exhibits normal muscle tone. Coordination normal.   Skin: Skin is warm, dry and intact. No rash noted. She is not diaphoretic. No erythema. Psychiatric: She has a normal mood and affect. Her behavior is normal.   Nursing note and vitals reviewed. Diagnostic Study Results     Radiologic Studies -      INDICATION:  Leg swelling, pain, DVT suspected     COMPARISON: None.     FINDINGS: Duplex Doppler sonography of the left lower extreme was performed from  the groin to the calf. There is evidence of DVT left common femoral, superficial  femoral, popliteal and calf veins. Right common femoral vein is patent. Results  were called to Dr. Ruy Ordonez. Patient was taken to the emergency department. .     IMPRESSION  IMPRESSION:  Extensive left DVT. Patient was taken to the emergency department.                 Medical Decision Making   I am the first provider for this patient.     I reviewed the vital signs, available nursing notes, past medical history, past surgical history, family history and social history. Vital Signs-Reviewed the patient's vital signs. Patient Vitals for the past 12 hrs:   Temp Pulse Resp BP SpO2   03/20/18 1707 97.1 °F (36.2 °C) (!) 109 16 126/79 100 %       Records Reviewed: Nursing Notes and Old Medical Records    Provider Notes (Medical Decision Making):   DDx: DVT    Discussed with daughter about benefits/risks of anticoagulation in the setting of pt's anemia. Daughter agrees with the benefits and will further discuss with PCP. ED Course:   Initial assessment performed. The patients presenting problems have been discussed, and they are in agreement with the care plan formulated and outlined with them. I have encouraged them to ask questions as they arise throughout their visit. Disposition:  DISCHARGE NOTE:  7:01 PM  Pt has been reexamined. Pt has no new complaints, changes, or physical findings. Care plan outlined and precautions discussed. All available results reviewed with pt. All medications reviewed with pt. All of pts questions and concerns addressed. Pt agrees to f/u as instructed and agrees to return to ED upon further deterioration. Pt is ready to go home. Written by Roya Marinelli ED Scribe as dictated by Dylan Costa MD    PLAN:  1. Discharge Medication List as of 3/20/2018  6:16 PM      START taking these medications    Details   rivaroxaban (XARELTO) 15 mg (42)- 20 mg (9) DsPk Take one 15 mg tablet twice a day with food for the first 21 days. Then, take one 20 mg tablet once a day with food for 9 days. , Normal, Disp-1 Dose Pack, R-0         CONTINUE these medications which have NOT CHANGED    Details   metoprolol succinate (TOPROL-XL) 50 mg XL tablet Take 1 Tab by mouth daily. , Normal, Disp-30 Tab, R-5      sertraline (ZOLOFT) 50 mg tablet Take 75 mg by mouth daily. , Historical Med      ALPRAZolam (XANAX) 0.25 mg tablet TAKE ONE TABLET BY MOUTH TWICE DAILY AS NEEDED FOR ANXIETY. MAX OF 2 TABLETS DAILY  Indications: anxiety, Print, Disp-60 Tab, R-3           2. Follow-up Information     Follow up With Details Comments [address] Albright, NP Schedule an appointment as soon as possible for a visit in 1 day  [address]  [phone]          Return to ED if worse     Diagnosis     Clinical Impression:   1. Acute deep vein thrombosis (DVT) of femoral vein of left lower extremity (HCC)        Attestations: This note is prepared by Niki Quispe acting as scribe for MD Zuleyka Cerna MD : The scribe's documentation has been prepared under my direction and personally reviewed by me in its entirety. I confirm that the note above accurately reflects all work, treatment, procedures, and medical decision making performed by me. This note will not be viewable in [address]

## 2018-03-20 NOTE — ED NOTES
Pt arrived via wheelchair to room #28 from triage. Pt with c/o of left calf swelling, worsening over several days. Per patient's daughter, patient was in the hospital a month ago for UTI leading to sepsis. Patient was ambulatory with assistance with walker but was not as ambulatory prior to discharge. While sitting around the house, her daughter noticed that her left calf was becoming red and swollen with the swelling radiating up the leg. Patient's daughter brought her to the ED to rule out DVT. Pt resting in position of comfort. Call bell within reach. Placed on monitor x3. Daughter at bedside.

## 2018-03-20 NOTE — MR AVS SNAPSHOT
[address] [facility] 
 
 
 [address] [facility] [place] [address] [address] 
[phone] Patient: Viad Mcneal MRN: [id number] Visit Information Date & Time Provider Department Dept. Phone Encounter #  
 3/20/2018  2:30 PM Alessia Pleitez NP [facility] [phone] [id number] Follow-up Instructions Return if symptoms worsen or fail to improve. Upcoming Health Maintenance Date Due  
 GLAUCOMA SCREENING Q2Y 5/13/2016 Pneumococcal 65+ High/Highest Risk (2 of 2 - PPSV23) 9/15/2016 MEDICARE YEARLY EXAM 8/1/2018 DTaP/Tdap/Td series (2 - Td) 7/31/2027 Allergies as of 3/20/2018  Review Complete On: 3/20/2018 By: Alessia Pleitez NP Severity Noted Reaction Type Reactions Bactrim [Sulfamethoxazole-trimethoprim] High 02/19/2018   Topical Rash Current Immunizations  Reviewed on 10/18/2015 Name Date Influenza High Dose Vaccine PF 9/15/2017  9:57 AM, 1/28/2015 Influenza Vaccine 10/3/2013 Influenza Vaccine (Quad) PF 10/18/2015  8:45 AM  
 Influenza Vaccine Split 8/31/2012, 9/15/2011 Tdap 7/31/2017 12:11 PM  
 ZZZ-RETIRED (DO NOT USE) Pneumococcal Vaccine (Unspecified Type) 9/15/2011 Not reviewed this visit You Were Diagnosed With   
  
 Codes Comments Left leg swelling    -  Primary ICD-10-CM: M79.89 ICD-9-CM: 729.81 Vitals BP Pulse Temp Resp Height(growth percentile) Weight(growth percentile) 127/77 (BP 1 Location: Right arm, BP Patient Position: Sitting) 63 96.8 °F (36 °C) (Oral) 16 5' 5\" (1.651 m) 107 lb 9.6 oz (48.8 kg) LMP SpO2 BMI OB Status Smoking Status (LMP Unknown) 94% 17.91 kg/m2 Hysterectomy Former Smoker BMI and BSA Data Body Mass Index Body Surface Area  
 17.91 kg/m 2 1.5 m 2 Preferred Pharmacy Pharmacy Name Phone [id number] [address] [facility] [phone] Your Updated Medication List  
  
 This list is accurate as of 3/20/18  3:07 PM.  Always use your most recent med list.  
  
  
  
  
 ALPRAZolam 0.25 mg tablet Commonly known as:  XANAX  
TAKE ONE TABLET BY MOUTH TWICE DAILY AS NEEDED FOR ANXIETY. MAX OF 2 TABLETS DAILY  Indications: anxiety  
  
 metoprolol succinate 50 mg XL tablet Commonly known as:  TOPROL-XL Take 1 Tab by mouth daily. ZOLOFT 50 mg tablet Generic drug:  sertraline Take 75 mg by mouth daily. Follow-up Instructions Return if symptoms worsen or fail to improve. To-Do List   
 03/20/2018 Imaging:  DUPLEX LOWER EXT VENOUS LEFT   
  
 03/20/2018 4:30 PM  
  Appointment with Anand Deluna 2 at [facility] Ultrasound ([phone]) No Prep  GENERAL INSTRUCTIONS 1. Bring any non [facility] facility films/reports pertaining to the area being studied with you on the day of appointment. 2. A written order with a valid diagnosis and Physicians signature is required for all scheduled tests. 3. Check in at registration 30 minutes before your appointment time unless you were instructed to do otherwise. Introducing [facility]! Jaskaran Garcia introduces bMobilized patient portal. Now you can access parts of your medical record, email your doctor's office, and request medication refills online. 1. In your internet browser, go to [email or web address] 2. Click on the First Time User? Click Here link in the Sign In box. You will see the New Member Sign Up page. 3. Enter your bMobilized Access Code exactly as it appears below. You will not need to use this code after youve completed the sign-up process. If you do not sign up before the expiration date, you must request a new code. · bMobilized Access Code: [email or web address] Expires: 6/18/2018  3:07 PM 
 
4. Enter the last four digits of your Social Security Number (xxxx) and Date of Birth (mm/dd/yyyy) as indicated and click Submit.  You will be taken to the next sign-up page. 5. Create a P2Binvestor ID. This will be your P2Binvestor login ID and cannot be changed, so think of one that is secure and easy to remember. 6. Create a P2Binvestor password. You can change your password at any time. 7. Enter your Password Reset Question and Answer. This can be used at a later time if you forget your password. 8. Enter your e-mail address. You will receive e-mail notification when new information is available in [address]. 9. Click Sign Up. You can now view and download portions of your medical record. 10. Click the Download Summary menu link to download a portable copy of your medical information. If you have questions, please visit the Frequently Asked Questions section of the P2Binvestor website. Remember, P2Binvestor is NOT to be used for urgent needs. For medical emergencies, dial 911. Now available from your iPhone and Android! Please provide this summary of care documentation to your next provider. Your primary care clinician is listed as [facility] Jonny Latif. If you have any questions after today's visit, please call [phone].

## 2018-03-20 NOTE — PROGRESS NOTES
Chief Complaint   Patient presents with    Leg Swelling     left lower leg     Patient here for left lower swelling. Became prominent this past week. No falls or redness noted.

## 2018-03-20 NOTE — PROGRESS NOTES
HISTORY OF PRESENT ILLNESS  Irineo Keller is a 80 y.o. female. HPI  Pt of Dr. Andria Brown with PMhx significant for MVP, hypertension, dementia, and recently hospitalized for sepsis 2/2 UTI here for an acute visit with CC of L lower leg swelling. Had home health evaluation, but was told that nothing could be done for her. She has declined significantly since she was discharged from the hospital in February. She is not eating except for a few bites; not sleeping well. She is very confused and cries a lot. She does not see any specialists. She lives with her daughter and her , who are her primary caregivers. She has had some left-sided leg swelling. Her diuretic was stopped in the hospital. In the last week, however, she has had left-sided leg swelling, all the way up to the thigh. Was going down at night, but now is not diminishing at night. No hx of DVT or clotting disorders. Spends most of her time sitting. No falls since hospital discharge. No fevers. Unsure of pain, as patient has dementia and is unable to express her pain. Visit Vitals    /77 (BP 1 Location: Right arm, BP Patient Position: Sitting)    Pulse 63    Temp 96.8 °F (36 °C) (Oral)    Resp 16    Ht 5' 5\" (1.651 m)    Wt 107 lb 9.6 oz (48.8 kg)    LMP  (LMP Unknown)    SpO2 94%    BMI 17.91 kg/m2     Current Outpatient Prescriptions on File Prior to Visit   Medication Sig Dispense Refill    metoprolol succinate (TOPROL-XL) 50 mg XL tablet Take 1 Tab by mouth daily. 30 Tab 5    sertraline (ZOLOFT) 50 mg tablet Take 75 mg by mouth daily.  ALPRAZolam (XANAX) 0.25 mg tablet TAKE ONE TABLET BY MOUTH TWICE DAILY AS NEEDED FOR ANXIETY. MAX OF 2 TABLETS DAILY  Indications: anxiety 60 Tab 3     No current facility-administered medications on file prior to visit. Review of Systems   Constitutional: Negative for chills and fever. Cardiovascular: Positive for leg swelling. Musculoskeletal: Negative for falls. Skin: Negative for itching and rash. Physical Exam   Constitutional: She is oriented to person, place, and time. She appears well-developed and well-nourished. No distress. HENT:   Head: Normocephalic and atraumatic. Cardiovascular: Normal rate, regular rhythm and normal heart sounds. 1+ left pedal pulse, brisk cap refill  R calf circumference 29 cm  L calf circumference 37.5 cm   Pulmonary/Chest: Effort normal and breath sounds normal. No respiratory distress. She has no wheezes. Neurological: She is alert and oriented to person, place, and time. Skin: Skin is warm and dry. She is not diaphoretic. Psychiatric: She has a normal mood and affect. Her behavior is normal. Judgment normal.   Nursing note and vitals reviewed. ASSESSMENT and PLAN    ICD-10-CM ICD-9-CM    1. Left leg swelling M79.89 729.81 DUPLEX LOWER EXT VENOUS LEFT     Concern for DVT today, given her sedentary lifestyle and significant size difference between her calves. Duplex arranged for 4:30pm this afternoon; daughter aware, and to bring her there right away. Await results from ultrasound and follow their directions. Follow-up Disposition:  Return if symptoms worsen or fail to improve.    Felix Tirado NP

## 2018-03-21 NOTE — ED NOTES
Discharge instructions given to patient by Shyanne Hoover MD. Patient verbalized understanding of discharge instructions. Pt discharged without difficulty. Pt discharged in stable condition via wheelchair, accompanied by daughter.

## 2018-03-21 NOTE — TELEPHONE ENCOUNTER
----- Message from Tanner Dasilva sent at 3/21/2018  8:07 AM EDT -----  Regarding: Dr Daryle Khat  Pt was sent to the ER yesterday for Blood Clots in her (L) legs, pt was given Xarelto it cost over $700 , need something cheaper, please call pt's daughter Tamara Enriquez at [phone].

## 2018-03-23 NOTE — TELEPHONE ENCOUNTER
Spoke with Solange Villafana, patient's daughter. Expressed my concerns about patient being on blood thinners (risk vs benefit) when patient was noted to be falling every day at her last office visit with me. Daughter indicated that falling has improved, patient has not fallen in at least 1 month. Daughter is not interested in Coumadin, states that she is familiar with coumadin therapy and required follow up and it is too taxing for such frequent visits. Will provide daughter with coupon for free 30d supply of Eliquis, and we can reassess how patient is doing after 30 days, if falling has increased again, she may need to stop and just take ASA. Patient daughter is agreeable. Will follow up in 1 month.

## 2018-03-23 NOTE — TELEPHONE ENCOUNTER
Called and spoke with daughter whom advised she got the Rx. Pts daughter also stated that the eliquis was 400. Which is also expensive. Advised her there is a 30 day free trial coupon here and one for commercial insurance. Pt wanted to know if there was anything at all cheaper.

## 2018-03-26 PROBLEM — D64.9 ANEMIA: Status: ACTIVE | Noted: 2018-01-01

## 2018-03-26 PROBLEM — F03.918 DEMENTIA WITH BEHAVIORAL DISTURBANCE: Status: ACTIVE | Noted: 2018-01-01

## 2018-03-26 NOTE — ED PROVIDER NOTES
EMERGENCY DEPARTMENT HISTORY AND PHYSICAL EXAM      Date: 3/26/2018  Patient Name: Carmelina Marx    History of Presenting Illness     Chief Complaint   Patient presents with    Anemia     Sent by [facility], Hgb was 7.2 in Feb, now 6.0. On Eliquis for DVT       History Provided By: Patient's Daughter and medical records    HPI: Carmelina Marx, 80 y.o. female with PMHx significant for HTN, HLD, anemia, and dementia, presents via EMS from [facility] to the ED with cc of progressively worsening, generalized weakness and associated fatigue x 1 week. Per daughter, the patient also c/o a productive cough with a moderate amount of sputum x 1 week. Per daughter, the patient was evaluated at her PCP office today, and she had blood work drawn that was significant for a Hgb of 6.0. Per medical records, the patient's Hgb was 7.2. 1 week ago. Per medical records, the patient was admitted on 2/13/2018 for sepsis secondary to a UTI. Per medical records, the patient was also diagnosed with a left DVT on 3/20/2018 and started on Eliquis. Per daughter, the patient's code status is DNR/DNI. Per daughter, the patient denies a history of heart failure. Per daughter, the patient specifically denies nausea, vomiting, hematemesis, blood in her stool/melena, SOB, hematuria, dysuria, eye drainage, rhinorrhea, diarrhea, or other complaints at this time. There are no other complaints, changes, or physical findings at this time.     PCP: Kelli Grimaldo NP   Heme/Onc: Iva Lockhart MD    Current Facility-Administered Medications   Medication Dose Route Frequency Provider Last Rate Last Dose    sodium chloride (NS) flush 5-10 mL  5-10 mL IntraVENous PRN Libia Pantoja MD        0.9% sodium chloride infusion 250 mL  250 mL IntraVENous PRN Libia Pantoja MD        cefTRIAXone (ROCEPHIN) 1 g in 0.9% sodium chloride (MBP/ADV) 50 mL MBP  1 g IntraVENous NOW Libia Pantoja MD         Current Outpatient Prescriptions Medication Sig Dispense Refill    apixaban (ELIQUIS) 5 mg tablet Take 1 Tab by mouth two (2) times a day for 180 days. Start in 1 week after loading dose. Take for 6 months 60 Tab 5    metoprolol succinate (TOPROL-XL) 50 mg XL tablet Take 1 Tab by mouth daily. 30 Tab 5    sertraline (ZOLOFT) 50 mg tablet Take 75 mg by mouth daily.  ALPRAZolam (XANAX) 0.25 mg tablet TAKE ONE TABLET BY MOUTH TWICE DAILY AS NEEDED FOR ANXIETY. MAX OF 2 TABLETS DAILY  Indications: anxiety 60 Tab 3       Past History     Past Medical History:  Past Medical History:   Diagnosis Date    Anemia     Anxiety     DDD (degenerative disc disease), cervical     Depression     Esophagitis     FH: breast cancer     FH: hypertension     Hypercholesterolemia     Hypertension     MR (mitral regurgitation)     MVP (mitral valve prolapse)     Stool color black     Thromboembolus ([address] 03/20/2018       Past Surgical History:  Past Surgical History:   Procedure Laterality Date    ABDOMEN SURGERY PROC UNLISTED  2015     and    2016    Bowel Blockage    DILATE ESOPHAGUS  6/3/2011         ENDOSCOPY, COLON, DIAGNOSTIC      due 2016    HX ABDOMINAL LAPAROSCOPY  10/2015    lap enterolysis for obstructing solitary adhesive band    HX CATARACT REMOVAL      HX GI      esoph dilatation    HX GI  08/27/2016    LAPAROTOMY EXPLORATORY, Ileocecectomy.  HX HYSTERECTOMY      HX ORTHOPAEDIC      R shoulder replacement    MS EGD TRANSORAL BIOPSY SINGLE/MULTIPLE  6/3/2011            Family History:  Family History   Problem Relation Age of Onset    Cancer Mother      breast    Hypertension Mother     Diabetes Mother     Cancer Father      head and neck    Hypertension Father     Cancer Brother     Cancer Maternal Aunt        Social History:  Social History   Substance Use Topics    Smoking status: Former Smoker    Smokeless tobacco: Never Used    Alcohol use No       Allergies:   Allergies   Allergen Reactions    Bactrim [Sulfamethoxazole-Trimethoprim] Rash       Review of Systems   Review of Systems   Constitutional: Positive for fatigue. Negative for chills and fever. HENT: Negative. Negative for rhinorrhea. Eyes: Negative for discharge. Respiratory: Positive for cough. Negative for shortness of breath. Cardiovascular: Negative for chest pain. Gastrointestinal: Negative for blood in stool, constipation, diarrhea, nausea and vomiting. Denies hematemesis   Genitourinary: Negative for dysuria and hematuria. Musculoskeletal: Negative. Skin: Negative. Neurological: Positive for weakness. Negative for numbness. All other systems reviewed and are negative. Physical Exam   Physical Exam   Constitutional:   Pt appears thin. HENT:   Head: Normocephalic and atraumatic. Eyes: Conjunctivae and EOM are normal.   Neck: Normal range of motion. Neck supple. Cardiovascular: Regular rhythm. Tachycardia present. Pulmonary/Chest: Effort normal and breath sounds normal. No respiratory distress. Abdominal: Soft. She exhibits no distension. There is no tenderness. Musculoskeletal: Normal range of motion. Neurological: She is alert. Pt is moving all extremities spontaneously. Skin: Skin is warm and dry. There is pallor. Psychiatric: She has a normal mood and affect. Nursing note and vitals reviewed. Diagnostic Study Results   Labs -     Recent Results (from the past 12 hour(s))   AMB POC COMPLETE CBC,AUTOMATED ENTER    Collection Time: 03/26/18  2:03 PM   Result Value Ref Range    WBC (POC)  K/uL    RBC (POC)  M/uL    HGB (POC)  g/dL    HCT (POC)  %    MCV (POC)  fL    MCH (POC)  pg    MCHC (POC)  g/dL    PLATELET (POC)  K/uL    ABS. LYMPHS (POC)  K/uL    LYMPHOCYTES (POC)  %    Mid # (POC)  10^3/ul    MID% POC  %    ABS.  GRANS (POC)  K/uL    GRANULOCYTES (POC)  %   METABOLIC PANEL, COMPREHENSIVE    Collection Time: 03/26/18  4:05 PM   Result Value Ref Range    Sodium 141 136 - 145 mmol/L Potassium 3.9 3.5 - 5.1 mmol/L    Chloride 107 97 - 108 mmol/L    CO2 25 21 - 32 mmol/L    Anion gap 9 5 - 15 mmol/L    Glucose 115 (H) 65 - 100 mg/dL    BUN 19 6 - 20 MG/DL    Creatinine 1.23 (H) 0.55 - 1.02 MG/DL    BUN/Creatinine ratio 15 12 - 20      GFR est AA 51 (L) >60 ml/min/1.73m2    GFR est non-AA 42 (L) >60 ml/min/1.73m2    Calcium 8.4 (L) 8.5 - 10.1 MG/DL    Bilirubin, total 0.7 0.2 - 1.0 MG/DL    ALT (SGPT) 13 12 - 78 U/L    AST (SGOT) 24 15 - 37 U/L    Alk. phosphatase 96 45 - 117 U/L    Protein, total 7.3 6.4 - 8.2 g/dL    Albumin 3.3 (L) 3.5 - 5.0 g/dL    Globulin 4.0 2.0 - 4.0 g/dL    A-G Ratio 0.8 (L) 1.1 - 2.2     CBC WITH AUTOMATED DIFF    Collection Time: 03/26/18  4:05 PM   Result Value Ref Range    WBC 3.5 (L) 3.6 - 11.0 K/uL    RBC 1.78 (L) 3.80 - 5.20 M/uL    HGB 6.0 (L) 11.5 - 16.0 g/dL    HCT 18.4 (L) 35.0 - 47.0 %    .4 (H) 80.0 - 99.0 FL    MCH 33.1 26.0 - 34.0 PG    MCHC 32.1 30.0 - 36.5 g/dL    RDW 18.1 (H) 11.5 - 14.5 %    PLATELET 155 625 - 722 K/uL    MPV 9.7 8.9 - 12.9 FL    NRBC 0.0 0  WBC    ABSOLUTE NRBC 0.00 0.00 - 0.01 K/uL    NEUTROPHILS 38 32 - 75 %    LYMPHOCYTES 53 (H) 12 - 49 %    MONOCYTES 7 5 - 13 %    EOSINOPHILS 0 0 - 7 %    BASOPHILS 1 0 - 1 %    IMMATURE GRANULOCYTES 1 (H) 0.0 - 0.5 %    ABS. NEUTROPHILS 1.3 (L) 1.8 - 8.0 K/UL    ABS. LYMPHOCYTES 1.9 0.8 - 3.5 K/UL    ABS. MONOCYTES 0.3 0.0 - 1.0 K/UL    ABS. EOSINOPHILS 0.0 0.0 - 0.4 K/UL    ABS. BASOPHILS 0.0 0.0 - 0.1 K/UL    ABS. IMM.  GRANS. 0.0 0.00 - 0.04 K/UL    DF AUTOMATED     PROTHROMBIN TIME + INR    Collection Time: 03/26/18  4:05 PM   Result Value Ref Range    INR 1.5 (H) 0.9 - 1.1      Prothrombin time 15.0 (H) 9.0 - 11.1 sec   OCCULT BLOOD, STOOL    Collection Time: 03/26/18  4:05 PM   Result Value Ref Range    Occult blood, stool NEGATIVE  NEG     LACTIC ACID    Collection Time: 03/26/18  4:05 PM   Result Value Ref Range    Lactic acid 1.5 0.4 - 2.0 MMOL/L       Radiologic Studies - CXR Results  (Last 48 hours)               03/26/18 1633  XR CHEST PORT Final result    Impression:  Impression: No acute process. Narrative: Indication: Sepsis       Comparison: 2/13/2018       Portable exam of the chest obtained at 1631 demonstrates normal heart size. There is no acute process in the lung fields. The patient is status post right   shoulder replacement. Medical Decision Making   I am the first provider for this patient. I reviewed the vital signs, available nursing notes, past medical history, past surgical history, family history and social history. Vital Signs-Reviewed the patient's vital signs. Patient Vitals for the past 12 hrs:   Temp Pulse Resp BP SpO2   03/26/18 1633 (!) 102 °F (38.9 °C) - - - -   03/26/18 1608 - - - - 98 %   03/26/18 1504 (!) 101.9 °F (38.8 °C) 71 16 116/51 98 %       Pulse Oximetry Analysis - 97% on RA    Cardiac Monitor:   Rate: 120 bpm  Rhythm: Sinus Tachycardia     EKG interpretation: (Preliminary) 16:39  Rhythm: normal sinus rhythm; and regular . Rate (approx.): 73; Axis: normal; TN interval: normal; QRS interval: normal ; ST/T wave: non-specific changes; Other findings: Prolonged QT. Written by ANMOL Gomez, as dictated by Pawan Huerta MD.    Records Reviewed: Nursing Notes, Old Medical Records, Previous electrocardiograms, Previous Radiology Studies and Previous Laboratory Studies    Provider Notes (Medical Decision Making):   Patient presenting with generalized fatigue. DDx: sepsis, infection, anemia, electrolyte anomoly (hypo or hyperkalemia, hypomagnesemia), hypothyroid, dehydration, depression, CA, ACS. Will obtain EKG, UA, labwork for any urgent/emergent pathology. ED Course:   Initial assessment performed. The patients presenting problems have been discussed, and they are in agreement with the care plan formulated and outlined with them.   I have encouraged them to ask questions as they arise throughout their visit. Procedure Note - Rectal Exam:   3:54 PM  Performed by: Jenny Sorto MD  Chaperoned by: Immanuel Nicholas RN  Rectal exam performed. Brown stool was collected. Stool was collected and sent to the lab for Hemoccult testing. The procedure took 1-15 minutes, and pt tolerated well. Progress Note:  5:02 PM  The patient's Hgb is 6.0, will order a unit of blood. Pt's WBC is decreased. Pt's lactic acid is negative. Will update the patient's daughter on the results and plan of care. Written by Tamiko Morfin ED Scribe, as dictated by Jenny Sorto MD.    CONSULT NOTE:   5:23 PM  Jenny Sorto MD spoke with Juliet Wilkins MD,   Specialty: Hospitalist  Discussed pt's hx, disposition, and available diagnostic and imaging results. Reviewed care plans. Consultant will evaluate pt for admission. Written by Tamiko Morfin ED Scribe, as dictated by Jenny Sorto MD.    CRITICAL CARE NOTE :  5:23 PM  IMPENDING DETERIORATION -Cardiovascular  ASSOCIATED RISK FACTORS - Hypotension and Shock  MANAGEMENT- Bedside Assessment and Supervision of Care  INTERPRETATION -  Blood Pressure and Cardiac Output Measures   INTERVENTIONS - hemodynamic mngmt  CASE REVIEW - Hospitalist, Nursing and Family  TREATMENT RESPONSE -Stable  PERFORMED BY - Self    NOTES   :  I have spent 40 minutes of critical care time involved in lab review, consultations with specialist, family decision- making, bedside attention and documentation. During this entire length of time I was immediately available to the patient . Admit Note:  5:23 PM  Pt is being admitted by Dr. Benjamin Rawls . The results of their tests and reason(s) for their admission have been discussed with pt and/or available family. They convey agreement and understanding for the need to be admitted and for admission diagnosis. Diagnosis     Clinical Impression:   1. Sepsis, due to unspecified organism ([address]    2. Anemia, unspecified type    3. Sinus tachycardia    4.  Dementia with behavioral disturbance, unspecified dementia type        Attestations: This note is prepared by Mayo Odonnell, acting as a Scribe for Pawan Huerta MD.    Pawan Huerta MD: The scribe's documentation has been prepared under my direction and personally reviewed by me in its entirety. I confirm that the notes above accurately reflects all work, treatment, procedures, and medical decision making performed by me. This note will not be viewable in [address]

## 2018-03-26 NOTE — MR AVS SNAPSHOT
[facility] 
 
 
 [address] [facility] [place] [address] [id number] [phone] Patient: Hu Lau MRN: [id number] UGR:[id number] Visit Information Date & Time Provider Department Dept. Phone Encounter #  
 3/26/2018 12:00 PM Mary Holman NP [facility] [phone] [id number] Follow-up Instructions Return if symptoms worsen or fail to improve. Upcoming Health Maintenance Date Due  
 GLAUCOMA SCREENING Q2Y 5/13/2016 Pneumococcal 65+ High/Highest Risk (2 of 2 - PPSV23) 9/15/2016 MEDICARE YEARLY EXAM 8/1/2018 DTaP/Tdap/Td series (2 - Td) 7/31/2027 Allergies as of 3/26/2018  Review Complete On: 3/26/2018 By: Michelle Humphreys LPN Severity Noted Reaction Type Reactions Bactrim [Sulfamethoxazole-trimethoprim] High 02/19/2018   Topical Rash Current Immunizations  Reviewed on 10/18/2015 Name Date Influenza High Dose Vaccine PF 9/15/2017  9:57 AM, 1/28/2015 Influenza Vaccine 10/3/2013 Influenza Vaccine (Quad) PF 10/18/2015  8:45 AM  
 Influenza Vaccine Split 8/31/2012, 9/15/2011 Tdap 7/31/2017 12:11 PM  
 ZZZ-RETIRED (DO NOT USE) Pneumococcal Vaccine (Unspecified Type) 9/15/2011 Not reviewed this visit You Were Diagnosed With   
  
 Codes Comments Acute deep vein thrombosis (DVT) of proximal vein of left lower extremity (HCC)    -  Primary ICD-10-CM: P55.8U3 ICD-9-CM: 453.41 Vitals BP Pulse Temp Resp Height(growth percentile) Weight(growth percentile) 137/45 (BP 1 Location: Left arm, BP Patient Position: Sitting) 85 96.1 °F (35.6 °C) (Oral) 16 5' 5\" (1.651 m) 107 lb 3.2 oz (48.6 kg) LMP SpO2 BMI OB Status Smoking Status (LMP Unknown) 95% 17.84 kg/m2 Hysterectomy Former Smoker BMI and BSA Data Body Mass Index Body Surface Area  
 17.84 kg/m 2 1.49 m 2 Preferred Pharmacy Pharmacy Name Phone [id number] Adrianne Funes [phone] Your Updated Medication List  
  
   
This list is accurate as of 3/26/18 12:48 PM.  Always use your most recent med list.  
  
  
  
  
 ALPRAZolam 0.25 mg tablet Commonly known as:  XANAX  
TAKE ONE TABLET BY MOUTH TWICE DAILY AS NEEDED FOR ANXIETY. MAX OF 2 TABLETS DAILY  Indications: anxiety  
  
 apixaban 5 mg tablet Commonly known as:  Tawannasandeep Rowell Take 1 Tab by mouth two (2) times a day for 180 days. Start in 1 week after loading dose. Take for 6 months  
  
 metoprolol succinate 50 mg XL tablet Commonly known as:  TOPROL-XL Take 1 Tab by mouth daily. ZOLOFT 50 mg tablet Generic drug:  sertraline Take 75 mg by mouth daily. Follow-up Instructions Return if symptoms worsen or fail to improve. Patient Instructions Deep Vein Thrombosis: Care Instructions Your Care Instructions A deep vein thrombosis (DVT) is a blood clot in certain veins of the legs, pelvis, or arms. Blood clots in these veins need to be treated because they can get bigger, break loose, and travel through the bloodstream to the lungs. A blood clot in a lung can be life-threatening. The doctor may have given you a blood thinner (anticoagulant). A blood thinner can stop the blood clot from growing larger and prevent new clots from forming. You will need to take a blood thinner for 3 to 6 months or longer. The doctor has checked you carefully, but problems can develop later. If you notice any problems or new symptoms, get medical treatment right away. Follow-up care is a key part of your treatment and safety. Be sure to make and go to all appointments, and call your doctor if you are having problems. It's also a good idea to know your test results and keep a list of the medicines you take. How can you care for yourself at home? · Take your medicines exactly as prescribed.  Call your doctor if you think you are having a problem with your medicine. · If you are taking a blood thinner, be sure you get instructions about how to take your medicine safely. Blood thinners can cause serious bleeding problems. · Wear compression stockings if your doctor recommends them. These stockings are tighter at the feet than on the legs. They may reduce pain and swelling in your legs. But there are different types of stockings, and they need to fit right. So your doctor will recommend what you need. · When you sit, use a pillow to raise the arm or leg that has the blood clot. Try to keep it above the level of your heart. When should you call for help? Call 911 anytime you think you may need emergency care. For example, call if: 
? · You passed out (lost consciousness). ? · You have symptoms of a blood clot in your lung (called a pulmonary embolism). These include: 
¨ Sudden chest pain. ¨ Trouble breathing. ¨ Coughing up blood. ?Call your doctor now or seek immediate medical care if: 
? · You have new or worse trouble breathing. ? · You are dizzy or lightheaded, or you feel like you may faint. ? · You have symptoms of a blood clot in your arm or leg. These may include: 
¨ Pain in the arm, calf, back of the knee, thigh, or groin. ¨ Redness and swelling in the arm, leg, or groin. ? Watch closely for changes in your health, and be sure to contact your doctor if: 
? · You do not get better as expected. Where can you learn more? Go to http://gautam-nathaniel.info/. Enter B276 in the search box to learn more about \"Deep Vein Thrombosis: Care Instructions. \" Current as of: March 20, 2017 Content Version: 11.4 © 1443-0041 [employer]. Care instructions adapted under license by [employer] (which disclaims liability or warranty for this information).  If you have questions about a medical condition or this instruction, always ask your healthcare professional. [employer] disclaims any warranty or liability for your use of this information. Introducing [facility] & HEALTH [facility]! [employer] introduces [employer] patient portal. Now you can access parts of your medical record, email your doctor's office, and request medication refills online. 1. In your internet browser, go to [email or web address] 2. Click on the First Time User? Click Here link in the Sign In box. You will see the New Member Sign Up page. 3. Enter your [employer] Access Code exactly as it appears below. You will not need to use this code after youve completed the sign-up process. If you do not sign up before the expiration date, you must request a new code. · [employer] Access Code: [email or web address] Expires: 6/18/2018  3:07 PM 
 
4. Enter the last four digits of your Social Security Number (xxxx) and Date of Birth (mm/dd/yyyy) as indicated and click Submit. You will be taken to the next sign-up page. 5. Create a [employer] ID. This will be your [employer] login ID and cannot be changed, so think of one that is secure and easy to remember. 6. Create a [employer] password. You can change your password at any time. 7. Enter your Password Reset Question and Answer. This can be used at a later time if you forget your password. 8. Enter your e-mail address. You will receive e-mail notification when new information is available in [address] 9. Click Sign Up. You can now view and download portions of your medical record. 10. Click the Download Summary menu link to download a portable copy of your medical information. If you have questions, please visit the Frequently Asked Questions section of the [employer] website. Remember, [employer] is NOT to be used for urgent needs. For medical emergencies, dial 911. Now available from your iPhone and Android! Please provide this summary of care documentation to your next provider. Your primary care clinician is listed as [facility]. If you have any questions after today's visit, please call [phone].

## 2018-03-26 NOTE — ED NOTES
Attempted to straight cath and no urine output. MD notified. MD updating patient and family with plan of care.

## 2018-03-26 NOTE — PROGRESS NOTES
Spoke with Rafal Momin today. She had planned to set up [facility] services for this new patient today. (Patient had been seen previously by Maribel Henao). Patient now being seen by Rafal Momin NP starting today. Patient has now been sent to ED. [facility] service needs on hold. I have sent message through [employer] system to [facility]/ED/[facility] to be aware patient is now in ED so that patient can be followed for potential d/c needs and services while in ED and if admitted will need f/u by case mgt services.

## 2018-03-26 NOTE — PROGRESS NOTES
Chief Complaint   Patient presents with    Follow-up     ER 3/20/18 DVT     Patient here for follow up to ER for DVT. Patient still not walking on leg. Swelling does not appear to have gone down much but somewhat less.

## 2018-03-26 NOTE — PROGRESS NOTES
HISTORY OF PRESENT ILLNESS  Nasreen Ruiz is a 80 y.o. female. HPI  Patient here for ER follow-up. I saw her last week for left leg swelling, sent for a Doppler due to concern for DVT. Had extensive DVT, sent to the ED and was started on Xarelto. However, it was too expensive, so was switched due to Eliquis; still > $400 per month. Was told, however, that perhaps due to risk for falls, switching to aspirin may be a better choice. Has not been ambulating on the leg still. Has had significant decline in her memory and has had emotional lability. Saw neuropsychiatry, who said there was no reason to come back as there was no testing that would be helpful/informative. Daughter Bel Mahajan has considered LTC, but states that her mother does not have the financial ability to pay for that. Visit Vitals    /45 (BP 1 Location: Left arm, BP Patient Position: Sitting)    Pulse 85    Temp 96.1 °F (35.6 °C) (Oral)    Resp 16    Ht 5' 5\" (1.651 m)    Wt 107 lb 3.2 oz (48.6 kg)    LMP  (LMP Unknown)    SpO2 95%    BMI 17.84 kg/m2     Current Outpatient Prescriptions on File Prior to Visit   Medication Sig Dispense Refill    apixaban (ELIQUIS) 5 mg tablet Take 1 Tab by mouth two (2) times a day for 180 days. Start in 1 week after loading dose. Take for 6 months 60 Tab 5    metoprolol succinate (TOPROL-XL) 50 mg XL tablet Take 1 Tab by mouth daily. 30 Tab 5    sertraline (ZOLOFT) 50 mg tablet Take 75 mg by mouth daily.  ALPRAZolam (XANAX) 0.25 mg tablet TAKE ONE TABLET BY MOUTH TWICE DAILY AS NEEDED FOR ANXIETY. MAX OF 2 TABLETS DAILY  Indications: anxiety 60 Tab 3     No current facility-administered medications on file prior to visit. Review of Systems   Constitutional: Negative for chills, fever and malaise/fatigue. Respiratory: Negative for cough, sputum production, shortness of breath and wheezing. Cardiovascular: Positive for leg swelling. Negative for chest pain and palpitations. Musculoskeletal: Positive for falls. Psychiatric/Behavioral: Positive for memory loss. The patient has insomnia. Physical Exam   Constitutional: She is oriented to person, place, and time. She appears well-developed and well-nourished. No distress. HENT:   Head: Normocephalic and atraumatic. Cardiovascular: Normal rate, regular rhythm and normal heart sounds. Pulmonary/Chest: Effort normal and breath sounds normal. No respiratory distress. She has no wheezes. Musculoskeletal:   Left calf still significantly larger than right calf, taut skin appearance   Neurological: She is alert and oriented to person, place, and time. Skin: Skin is warm and dry. She is not diaphoretic. Psychiatric: She has a normal mood and affect. Nursing note and vitals reviewed. ASSESSMENT and PLAN    ICD-10-CM ICD-9-CM    1. Acute deep vein thrombosis (DVT) of proximal vein of left lower extremity (HCC) I82.4Y2 453.41 REFERRAL TO HEMATOLOGY ONCOLOGY      REFERRAL TO HOME HEALTH   2. Anemia, unspecified type D64.9 285.9 AMB POC COMPLETE CBC,AUTOMATED ENTER      FERRITIN      IRON PROFILE      VITAMIN B12 & FOLATE   3. Dementia with behavioral disturbance, unspecified dementia type F03.91 294.21      DVT - Will continue eliquis for now, but discussed with daughter the pros/cons of the use of eliquis vs. Warfarin (cost, reversal agent, INR checks). [facility] and involve NN to see if we can get home health to check INR. For now, continue eliquis. Referral to heme-onc for further evaluation. After patient/daughter left, I realized that her most recent Hgb was 7.2, drawn 2.27.2018, and never repeated. Was not repeated in the INR last week. Called daughter and left message with  to return mother to the office for repeat CBC and anemia labs. If still low, will stop eliquis and refer urgently to GI for additional workup. Addendum: Patient/daughter returned for labwork. Hgb 6.0 on in-office CBC. Daughter/patient informed and sent to the ED. Report called to     Follow-up Disposition:  Return if symptoms worsen or fail to improve.    Lindie Meckel, NP

## 2018-03-26 NOTE — IP AVS SNAPSHOT
[address] [facility] 
[phone] Patient: Anil Anderson MRN: [id number] XLJ:[birth year] About your hospitalization You were admitted on:  March 26, 2018 You last received care in the:  [facility] 2 GENERAL SURGERY You were discharged on:  March 28, 2018 Why you were hospitalized Your primary diagnosis was:  Not on File Your diagnoses also included:  Anemia, Malignant Neoplasm Of Ascending Colon (Hcc), Dementia With Behavioral Disturbance, Left Leg Dvt (Hcc), Gram-Negative Bacteremia, Uti (Urinary Tract Infection) Follow-up Information Follow up With Details Comments Contact Info Glenis Varela NP  As needed [address] [place] [id number] [phone] Your Scheduled Appointments Monday April 09, 2018  3:00 PM EDT New Patient with Danyelle Matthews MD  
[address] [facility] [address] [facility]  
[phone] Discharge Orders None A check cuba indicates which time of day the medication should be taken. My Medications START taking these medications Instructions Each Dose to Equal  
 Morning Noon Evening Bedtime  
 cephALEXin 500 mg capsule Commonly known as:  Shannan Gayer Your last dose was: Your next dose is: Take 1 Cap by mouth four (4) times daily for 10 days. 500 mg CONTINUE taking these medications Instructions Each Dose to Equal  
 Morning Noon Evening Bedtime ALPRAZolam 0.25 mg tablet Commonly known as:  Dennisrachel Guerra Your last dose was: Your next dose is: TAKE ONE TABLET BY MOUTH TWICE DAILY AS NEEDED FOR ANXIETY. MAX OF 2 TABLETS DAILY  Indications: anxiety  
     
   
   
   
  
 metoprolol succinate 50 mg XL tablet Commonly known as:  TOPROL-XL  
   
 Your last dose was: Your next dose is: Take 1 Tab by mouth daily. 50 mg  
    
   
   
   
  
 ZOLOFT 50 mg tablet Generic drug:  sertraline Your last dose was: Your next dose is: Take 75 mg by mouth daily. 75 mg  
    
   
   
   
  
  
STOP taking these medications   
 apixaban 5 mg tablet Commonly known as:  Alejandro Rowell Where to Get Your Medications Information on where to get these meds will be given to you by the nurse or doctor. ! Ask your nurse or doctor about these medications  
  cephALEXin 500 mg capsule Discharge Instructions HOSPITALIST DISCHARGE INSTRUCTIONS 
 
NAME: Merced Villagomez :  1934 MRN:  [id number] Date/Time:  3/28/2018 9:06 AM 
 
ADMIT DATE: 3/26/2018 DISCHARGE DATE: 3/28/2018 DISCHARGE DIAGNOSIS: 
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 
 
Active Problems: 
  Malignant neoplasm of ascending colon ([facility] [address] (2016) UTI (urinary tract infection) (2018) Dementia with behavioral disturbance (3/26/2018) Anemia (3/26/2018) Left leg DVT ([facility] [address] (3/27/2018) Gram-negative bacteremia (3/27/2018) MEDICATIONS: 
As per medication reconciliation  list 
· It is important that you take the medication exactly as they are prescribed. · Keep your medication in the bottles provided by the pharmacist and keep a list of the medication names, dosages, and times to be taken in your wallet. · Do not take other medications without consulting your doctor. Pain Management: per above medications What to do at [facility] Recommended diet:  Comfort Diet as per hospice philosophy Recommended activity: Activity as tolerated If you have questions regarding the hospital related prescriptions or hospital related issues please call Beatriz Buck at . Follow Up: 
Dr. Mir Brink, NP  As needed for any Hospice orders Home hospice as arranged by CM today Information obtained by : 
I understand that if any problems occur once I am at home I am to contact my physician. I understand and acknowledge receipt of the instructions indicated above. Physician's or R.N.'s Signature                                                                  Date/Time Patient or Representative Signature                                                          Date/Time 
 
ACO Transitions of Care Introducing [employer] [address] Maxine Moura offers a voluntary care coordination program to provide high quality service and care to [facility] fee-for-service beneficiaries. Veda Hunter was designed to help you enhance your health and well-being through the following services: ? Transitions of Care  support for individuals who are transitioning from one care setting to another (example: Hospital to home). ?  Chronic and Complex Care Coordination  support for individuals and caregivers of those with serious or chronic illnesses or with more than one chronic (ongoing) condition and those who take a number of different medications. If you meet specific medical criteria, a [facility] [address] may call you directly to coordinate your care with your primary care physician and your other care providers. For questions about the [facility] programs, please, contact your physicians office. For general questions or additional information about Accountable Care Organizations: 
Please visit www.medicare.gov/acos. html or call 1-800-MEDICARE (4-119.795.3833) TTY users should call 3-554.646.8946. [employer] Announcement We are excited to announce that we are making your provider's discharge notes available to you in [employer]. You will see these notes when they are completed and signed by the physician that discharged you from your recent hospital stay. If you have any questions or concerns about any information you see in [employer], please call the Health Information Department where you were seen or reach out to your Primary Care Provider for more information about your plan of care. Introducing [facility] [place] [facility]! Jo Ann Catherine introduces [employer] patient portal. Now you can access parts of your medical record, email your doctor's office, and request medication refills online. 1. In your internet browser, go to [email or web address] 2. Click on the First Time User? Click Here link in the Sign In box. You will see the New Member Sign Up page. 3. Enter your [employer] Access Code exactly as it appears below. You will not need to use this code after youve completed the sign-up process. If you do not sign up before the expiration date, you must request a new code. · [employer] Access Code: [email or web address] Expires: 6/18/2018  3:07 PM 
 
4.  Enter the last four digits of your Social Security Number (xxxx) and Date of Birth (mm/dd/yyyy) as indicated and click Submit. You will be taken to the next sign-up page. 5. Create a [employer] ID. This will be your [employer] login ID and cannot be changed, so think of one that is secure and easy to remember. 6. Create a [employer] password. You can change your password at any time. 7. Enter your Password Reset Question and Answer. This can be used at a later time if you forget your password. 8. Enter your e-mail address. You will receive e-mail notification when new information is available in [address] 9. Click Sign Up. You can now view and download portions of your medical record. 10. Click the Download Summary menu link to download a portable copy of your medical information. If you have questions, please visit the Frequently Asked Questions section of the [employer] website. Remember, [employer] is NOT to be used for urgent needs. For medical emergencies, dial 911. Now available from your iPhone and Android! Introducing Ilya Rothman As a [facility] patient, I wanted to make you aware of our electronic visit tool called Ilya Rothman. [facility] 24/7 allows you to connect within minutes with a medical provider 24 hours a day, seven days a week via a mobile device or tablet or logging into a secure website from your computer. You can access Ilya Rothman from anywhere in the [place]. A virtual visit might be right for you when you have a simple condition and feel like you just dont want to get out of bed, or cant get away from work for an appointment, when your regular [facility] provider is not available (evenings, weekends or holidays), or when youre out of town and need minor care. Electronic visits cost only $49 and if the [facility] 24/7 provider determines a prescription is needed to treat your condition, one can be electronically transmitted to a nearby pharmacy*. Please take a moment to enroll today if you have not already done so. The enrollment process is free and takes just a few minutes. To enroll, please download the [employer] 24/7 jackeline to your tablet or phone, or visit [email or web address] to enroll on your computer. And, as an [address] [place] [address] patient with a [employer] account, the results of your visits will be scanned into your electronic medical record and your primary care provider will be able to view the scanned results. We urge you to continue to see your regular [employer] provider for your ongoing medical care. And while your primary care provider may not be the one available when you seek a [employer]her virtual visit, the peace of mind you get from getting a real diagnosis real time can be priceless. For more information on [employer], view our Frequently Asked Questions (FAQs) at [email or web address]. Sincerely, 
 
Jean Steen MD 
Chief Medical Officer [facility] Maxine Moura *:  certain medications cannot be prescribed via B[employer]her Unresulted Labs-Please follow up with your PCP about these lab tests Order Current Status CULTURE, BLOOD Preliminary result CULTURE, BLOOD Preliminary result CULTURE, URINE Preliminary result Providers Seen During Your Hospitalization Provider Specialty Primary office phone Juan Carlos Kerr MD Emergency Medicine [phone] Navya Gomez MD Internal Medicine [phone] Your Primary Care Physician (PCP) Primary Care Physician Office Phone Office Fax Hellen AMARAL [phone] You are allergic to the following Allergen Reactions Bactrim (Sulfamethoxazole-Trimethoprim) Rash Recent Documentation Height Weight BMI OB Status Smoking Status 1.651 m 48 kg 17.61 kg/m2 Hysterectomy Former Smoker Emergency Contacts Name Discharge Info Relation Home Work Mobile [facility] DISCHARGE CAREGIVER [3] Child [2] [phone] Patient Belongings The following personal items are in your possession at time of discharge: 
     Visual Aid: None Please provide this summary of care documentation to your next provider. Signatures-by signing, you are acknowledging that this After Visit Summary has been reviewed with you and you have received a copy. Patient Signature:  ____________________________________________________________ Date:  ____________________________________________________________  
  
[facility] Signature:  ____________________________________________________________ Date:  ____________________________________________________________

## 2018-03-26 NOTE — TELEPHONE ENCOUNTER
Called to have patient come back for repeat CBC and anemia workup labs. Unable to reach patient/daughter; message left with daughter's , who said he will relay the message. Will have LPN call to follow up later today if they do not return.   Di Quintero, NP

## 2018-03-26 NOTE — PATIENT INSTRUCTIONS
Deep Vein Thrombosis: Care Instructions  Your Care Instructions    A deep vein thrombosis (DVT) is a blood clot in certain veins of the legs, pelvis, or arms. Blood clots in these veins need to be treated because they can get bigger, break loose, and travel through the bloodstream to the lungs. A blood clot in a lung can be life-threatening. The doctor may have given you a blood thinner (anticoagulant). A blood thinner can stop the blood clot from growing larger and prevent new clots from forming. You will need to take a blood thinner for 3 to 6 months or longer. The doctor has checked you carefully, but problems can develop later. If you notice any problems or new symptoms, get medical treatment right away. Follow-up care is a key part of your treatment and safety. Be sure to make and go to all appointments, and call your doctor if you are having problems. It's also a good idea to know your test results and keep a list of the medicines you take. How can you care for yourself at home? · Take your medicines exactly as prescribed. Call your doctor if you think you are having a problem with your medicine. · If you are taking a blood thinner, be sure you get instructions about how to take your medicine safely. Blood thinners can cause serious bleeding problems. · Wear compression stockings if your doctor recommends them. These stockings are tighter at the feet than on the legs. They may reduce pain and swelling in your legs. But there are different types of stockings, and they need to fit right. So your doctor will recommend what you need. · When you sit, use a pillow to raise the arm or leg that has the blood clot. Try to keep it above the level of your heart. When should you call for help? Call 911 anytime you think you may need emergency care. For example, call if:  ? · You passed out (lost consciousness). ? · You have symptoms of a blood clot in your lung (called a pulmonary embolism).  These include:  ¨ Sudden chest pain. ¨ Trouble breathing. ¨ Coughing up blood. ?Call your doctor now or seek immediate medical care if:  ? · You have new or worse trouble breathing. ? · You are dizzy or lightheaded, or you feel like you may faint. ? · You have symptoms of a blood clot in your arm or leg. These may include:  ¨ Pain in the arm, calf, back of the knee, thigh, or groin. ¨ Redness and swelling in the arm, leg, or groin. ? Watch closely for changes in your health, and be sure to contact your doctor if:  ? · You do not get better as expected. Where can you learn more? Go to http://gautam-nathaniel.info/. Enter P926 in the search box to learn more about \"Deep Vein Thrombosis: Care Instructions. \"  Current as of: March 20, 2017  Content Version: 11.4  © 3625-5744 [employer]. Care instructions adapted under license by [employer] (which disclaims liability or warranty for this information). If you have questions about a medical condition or this instruction, always ask your healthcare professional. [place] [address] any warranty or liability for your use of this information.

## 2018-03-26 NOTE — ED NOTES
Assumed care of pt from Kashif Johnson RN. Pt resting quietly and in no acute distress at this time. VSS. Call bell within reach.

## 2018-03-27 PROBLEM — R78.81 GRAM-NEGATIVE BACTEREMIA: Status: ACTIVE | Noted: 2018-01-01

## 2018-03-27 PROBLEM — I82.402 LEFT LEG DVT (HCC): Status: ACTIVE | Noted: 2018-01-01

## 2018-03-27 NOTE — CDMP QUERY
1.   Please give the clinical significance of baseline serum creatinine of 37-56 with corresponding GFR of 37-49 dating 12/2016 through present admission    => CKD 3  => Other explanation of clinical findings  => Clinically Undetermined (no explanation for clinical findings)    The medical record reflects the following clinical findings, treatment, and risk factors. Risk Factors:  hypertension, elderly female    Clinical Indicators:  81 y/o female admitted for anemia, sepsis and UTI, on admission is noted that serum creatinine baseline dating 2016 December through present stay is 37-56 with GFR of 37-49    Treatment: serial monitoring of labs    Please clarify and document your clinical opinion in the progress notes and discharge summary including the definitive and/or presumptive diagnosis, (suspected or probable), related to the above clinical findings. Please include clinical findings supporting your diagnosis. Thanks for your time.     Pratima Johnson RN, BSN  [phone]

## 2018-03-27 NOTE — H&P
Hospitalist Admission Note    NAME: Hu Lau   :  1934   MRN:  [id number]     Date/Time:  3/26/2018 8:09 PM    Patient PCP: Mary Holman, PERNELL  ______________________________________________________________________  Given the patient's current clinical presentation, I have a high level of concern for decompensation if discharged from the emergency department. Complex decision making was performed, which includes reviewing the patient's available past medical records, laboratory results, and x-ray films. My assessment of this patient's clinical condition and my plan of care is as follows. Assessment / Plan:  Acute on chronic anemia and leukopenia  -heme occult negative  -pt is chronically low with last hgb ~ 6.7 when last discharged in 18  -s/p 1 unit PRBC   -f/u with iron panel, B12, folate  -discussed oncology evaluation with pt's daughter with possible BM biopsy. Daughter declined as she wish for no aggressive/invasive procedure. Daughter wish to have pt discharge home as soon as possible. She states she attempted to discuss with pt's PCP today in regards to home with hospice, however was sent here for evaluation. I will consult our hospice team to meet with daughter per her wish. Daughter is requesting home with hospice in the AM    Sepsis, POA  UTI  -follow urine cx, Bcx  -cont' rocephin  -IVF    Recent L leg DVT  -will hold Eliquis  -discussed with possibility of IVC filter placement, but daughter declined. She understand that DVT can lead to PE. Daughter is willing to take that risk if pt cannot be AC. Hx of adenocarcinoma of cecum   -follows with Dr Jacqueline Matias. Per chart review, she has an excellent long term prognosis. No role of adjuvant chemotherapy.   rec obs with routine surveillance scans    Severe dementia with hx of sundowning  Anxiety and agitation, per daughter, difficult to control  -cont' home xanax  -haldol prn  -may need sitter     HTN  -resume BB  -hydralazine prn    CKD 3  -cr stable, monitor cr    Anxiety    Code Status: DNR  Surrogate Decision Maker:  Pt's daughter  DVT Prophylaxis: took Eliquis this morning. Unable to do SCDs due to new L leg DVT  GI Prophylaxis: not indicated      Subjective:   CHIEF COMPLAINT: anemia    HISTORY OF PRESENT ILLNESS:     Maikel Kelsey is a 80 y.o.  female with PMHx significant for severe dementia, leukopenia, anemia, recent L leg DVT on Eliquis, present to the ED for further evaluation of acute on chronic anemia with hgb 6. Per daughter, pt was at PCP today for routine visit, found to have hgb of 6, but was asymptomatic. Pt  was sent to the ER for further evaluation with concerns of acute blood loss while on Eliquis. No associated cp, sob, palpitations, n/v/d. Anemia appears to be chronic, however daughter states had never been workup. In speaking with pt's daughter, she wish for no aggressive measure/ no invasive procedure. Daughter does not wish to have further work up in regards to anemia. In the ER, pt was anxious, at baseline confusion. Daughter states she wish to have patient discharge to home the sooner the better. Vitals: T101.9, P71, /95, SpO2 98% on RA  Pertinent las: CBC 3.5, hgb 6.0, , UA consistent with UTI, heme occult neg  We were asked to admit for work up and evaluation of the above problems.      Past Medical History:   Diagnosis Date    Anemia     Anxiety     DDD (degenerative disc disease), cervical     Depression     Esophagitis     FH: breast cancer     FH: hypertension     Hypercholesterolemia     Hypertension     MR (mitral regurgitation)     MVP (mitral valve prolapse)     Stool color black     Thromboembolus ([facility] [address] 03/20/2018        Past Surgical History:   Procedure Laterality Date    ABDOMEN SURGERY PROC UNLISTED  2015     and    2016    Bowel Blockage    DILATE ESOPHAGUS  6/3/2011         ENDOSCOPY, COLON, DIAGNOSTIC      due 2016    HX ABDOMINAL LAPAROSCOPY  10/2015    lap enterolysis for obstructing solitary adhesive band    HX CATARACT REMOVAL      HX GI      esoph dilatation    HX GI  08/27/2016    LAPAROTOMY EXPLORATORY, Ileocecectomy.  HX HYSTERECTOMY      HX ORTHOPAEDIC      R shoulder replacement    CO EGD TRANSORAL BIOPSY SINGLE/MULTIPLE  6/3/2011            Social History   Substance Use Topics    Smoking status: Former Smoker    Smokeless tobacco: Never Used    Alcohol use No        Family History   Problem Relation Age of Onset    Cancer Mother      breast    Hypertension Mother     Diabetes Mother     Cancer Father      head and neck    Hypertension Father     Cancer Brother     Cancer Maternal Aunt      Allergies   Allergen Reactions    Bactrim [Sulfamethoxazole-Trimethoprim] Rash        Prior to Admission medications    Medication Sig Start Date End Date Taking? Authorizing Provider   apixaban (ELIQUIS) 5 mg tablet Take 1 Tab by mouth two (2) times a day for 180 days. Start in 1 week after loading dose. Take for 6 months 3/21/18 9/17/18  David Sarah NP   metoprolol succinate (TOPROL-XL) 50 mg XL tablet Take 1 Tab by mouth daily. 2/27/18   David Sarah NP   sertraline (ZOLOFT) 50 mg tablet Take 75 mg by mouth daily. Historical Provider   ALPRAZolam (XANAX) 0.25 mg tablet TAKE ONE TABLET BY MOUTH TWICE DAILY AS NEEDED FOR ANXIETY. MAX OF 2 TABLETS DAILY  Indications: anxiety 11/1/17   David Sarah NP       REVIEW OF SYSTEMS:     I am not able to complete the review of systems because:    The patient is intubated and sedated    The patient has altered mental status due to his acute medical problems    The patient has baseline aphasia from prior stroke(s)   xxxx The patient has baseline dementia and is not reliable historian    The patient is in acute medical distress and unable to provide information           Total of 12 systems reviewed as follows:       POSITIVE= BOLD text  Negative = text not BOLD  General:  fever, chills, sweats, generalized weakness, weight loss/gain,      loss of appetite   Eyes:    blurred vision, eye pain, loss of vision, double vision  ENT:    rhinorrhea, pharyngitis   Respiratory:   cough, sputum production, SOB, HENDERSON, wheezing, pleuritic pain   Cardiology:   chest pain, palpitations, orthopnea, PND, edema, syncope   Gastrointestinal:  abdominal pain , N/V, diarrhea, dysphagia, constipation, bleeding   Genitourinary:  frequency, urgency, dysuria, hematuria, incontinence   Muskuloskeletal :  arthralgia, myalgia, back pain  Hematology:  easy bruising, nose or gum bleeding, lymphadenopathy   Dermatological: rash, ulceration, pruritis, color change / jaundice  Endocrine:   hot flashes or polydipsia   Neurological:  headache, dizziness, confusion, focal weakness, paresthesia,     Speech difficulties, memory loss, gait difficulty  Psychological: Feelings of anxiety, depression, agitation    Objective:   VITALS:    Visit Vitals    BP (!) 141/95    Pulse (!) 112    Temp (P) 98.6 °F (37 °C)    Resp 16    Ht 5' 5\" (1.651 m)    Wt 48 kg (105 lb 13.1 oz)    SpO2 (P) 92%    BMI 17.61 kg/m2       PHYSICAL EXAM:    General:    Female in bed, appeared anxious, agitated  HEENT: Atraumatic, anicteric sclerae, pink conjunctivae     No oral ulcers, mucosa moist, throat clear  Neck:  Supple, symmetrical,  thyroid: non tender  Lungs:   CTA b/l. No wheezing or Rhonchi. No rales. Chest wall:  No tenderness. No accessory muscle use. Heart:   tachycardia,  No  Murmur. No edema  Abdomen:   Soft, NT. ND  BS+  Extremities: No cyanosis. No clubbing,      Skin turgor normal, Radial dial pulse 2+  Skin:     Not Jaundiced  No rashes   Psych:  Anxious and agitated  Neurologic: No facial asymmetry. No aphasia.   AAOx0    _______________________________________________________________________  Care Plan discussed with:    Comments   Patient x    Family  x daughter   RN x    Care Manager Consultant:  kacey COREA physician   _______________________________________________________________________  Expected  Disposition:   Home with Family x   HH/PT/OT/RN    SNF/LTC    GYPSY    ________________________________________________________________________  TOTAL TIME:  72 Minutes    Critical Care Provided     Minutes non procedure based      Comments    x Reviewed previous records   >50% of visit spent in counseling and coordination of care x Discussion with patient and/or family and questions answered       ________________________________________________________________________  Signed: Kelly Roman MD    Procedures: see electronic medical records for all procedures/Xrays and details which were not copied into this note but were reviewed prior to creation of Plan. LAB DATA REVIEWED:    Recent Results (from the past 24 hour(s))   AMB POC COMPLETE CBC,AUTOMATED ENTER    Collection Time: 03/26/18  2:03 PM   Result Value Ref Range    WBC (POC)  K/uL    RBC (POC)  M/uL    HGB (POC)  g/dL    HCT (POC)  %    MCV (POC)  fL    MCH (POC)  pg    MCHC (POC)  g/dL    PLATELET (POC)  K/uL    ABS. LYMPHS (POC)  K/uL    LYMPHOCYTES (POC)  %    Mid # (POC)  10^3/ul    MID% POC  %    ABS. GRANS (POC)  K/uL    GRANULOCYTES (POC)  %   METABOLIC PANEL, COMPREHENSIVE    Collection Time: 03/26/18  4:05 PM   Result Value Ref Range    Sodium 141 136 - 145 mmol/L    Potassium 3.9 3.5 - 5.1 mmol/L    Chloride 107 97 - 108 mmol/L    CO2 25 21 - 32 mmol/L    Anion gap 9 5 - 15 mmol/L    Glucose 115 (H) 65 - 100 mg/dL    BUN 19 6 - 20 MG/DL    Creatinine 1.23 (H) 0.55 - 1.02 MG/DL    BUN/Creatinine ratio 15 12 - 20      GFR est AA 51 (L) >60 ml/min/1.73m2    GFR est non-AA 42 (L) >60 ml/min/1.73m2    Calcium 8.4 (L) 8.5 - 10.1 MG/DL    Bilirubin, total 0.7 0.2 - 1.0 MG/DL    ALT (SGPT) 13 12 - 78 U/L    AST (SGOT) 24 15 - 37 U/L    Alk.  phosphatase 96 45 - 117 U/L    Protein, total 7.3 6.4 - 8.2 g/dL    Albumin 3.3 (L) 3.5 - 5.0 g/dL Globulin 4.0 2.0 - 4.0 g/dL    A-G Ratio 0.8 (L) 1.1 - 2.2     CBC WITH AUTOMATED DIFF    Collection Time: 03/26/18  4:05 PM   Result Value Ref Range    WBC 3.5 (L) 3.6 - 11.0 K/uL    RBC 1.78 (L) 3.80 - 5.20 M/uL    HGB 6.0 (L) 11.5 - 16.0 g/dL    HCT 18.4 (L) 35.0 - 47.0 %    .4 (H) 80.0 - 99.0 FL    MCH 33.1 26.0 - 34.0 PG    MCHC 32.1 30.0 - 36.5 g/dL    RDW 18.1 (H) 11.5 - 14.5 %    PLATELET 715 633 - 352 K/uL    MPV 9.7 8.9 - 12.9 FL    NRBC 0.0 0  WBC    ABSOLUTE NRBC 0.00 0.00 - 0.01 K/uL    NEUTROPHILS 38 32 - 75 %    LYMPHOCYTES 53 (H) 12 - 49 %    MONOCYTES 7 5 - 13 %    EOSINOPHILS 0 0 - 7 %    BASOPHILS 1 0 - 1 %    IMMATURE GRANULOCYTES 1 (H) 0.0 - 0.5 %    ABS. NEUTROPHILS 1.3 (L) 1.8 - 8.0 K/UL    ABS. LYMPHOCYTES 1.9 0.8 - 3.5 K/UL    ABS. MONOCYTES 0.3 0.0 - 1.0 K/UL    ABS. EOSINOPHILS 0.0 0.0 - 0.4 K/UL    ABS. BASOPHILS 0.0 0.0 - 0.1 K/UL    ABS. IMM.  GRANS. 0.0 0.00 - 0.04 K/UL    DF AUTOMATED     PROTHROMBIN TIME + INR    Collection Time: 03/26/18  4:05 PM   Result Value Ref Range    INR 1.5 (H) 0.9 - 1.1      Prothrombin time 15.0 (H) 9.0 - 11.1 sec   TYPE & SCREEN    Collection Time: 03/26/18  4:05 PM   Result Value Ref Range    Crossmatch Expiration 03/29/2018     ABO/Rh(D) Gui Victor POSITIVE     Antibody screen NEG     Unit number [id number]     Blood component type RC LR     Unit division 00     Status of unit ISSUED     Crossmatch result Compatible    OCCULT BLOOD, STOOL    Collection Time: 03/26/18  4:05 PM   Result Value Ref Range    Occult blood, stool NEGATIVE  NEG     LACTIC ACID    Collection Time: 03/26/18  4:05 PM   Result Value Ref Range    Lactic acid 1.5 0.4 - 2.0 MMOL/L   EKG, 12 LEAD, INITIAL    Collection Time: 03/26/18  4:39 PM   Result Value Ref Range    Ventricular Rate 73 BPM    Atrial Rate 73 BPM    P-R Interval 128 ms    QRS Duration 80 ms    Q-T Interval 466 ms    QTC Calculation (Bezet) 513 ms    Calculated P Axis 111 degrees    Calculated R Axis 50 degrees Calculated T Axis 67 degrees    Diagnosis       Normal sinus rhythm  Nonspecific ST and T wave abnormality  Prolonged QT  Abnormal ECG  When compared with ECG of 13-FEB-2018 15:05,  Nonspecific T wave abnormality no longer evident in Anterolateral leads  QT has lengthened     URINALYSIS W/ REFLEX CULTURE    Collection Time: 03/26/18  6:35 PM   Result Value Ref Range    Color YELLOW/STRAW      Appearance CLOUDY (A) CLEAR      Specific gravity 1.012 1.003 - 1.030      pH (UA) 7.5 5.0 - 8.0      Protein 100 (A) NEG mg/dL    Glucose NEGATIVE  NEG mg/dL    Ketone NEGATIVE  NEG mg/dL    Bilirubin NEGATIVE  NEG      Blood LARGE (A) NEG      Urobilinogen 1.0 0.2 - 1.0 EU/dL    Nitrites POSITIVE (A) NEG      Leukocyte Esterase SMALL (A) NEG      WBC  0 - 4 /hpf    RBC  0 - 5 /hpf    Epithelial cells FEW FEW /lpf    Bacteria 3+ (A) NEG /hpf    UA:UC IF INDICATED URINE CULTURE ORDERED (A) CNI

## 2018-03-27 NOTE — PROGRESS NOTES
Spiritual Care Partner Volunteer visited patient in [place] on 3/27/18. Documented by:  REBEKAH Gardner

## 2018-03-27 NOTE — HOSPICE
[facility] STEF request for information  note. Order for hospice noted. History and events of this hospitalization reviewed. I called daughter Kyle Keith, introduced myself and provided support. Plans are to meet in Ms Park's room at 1330 today.     Please call [phone] if questions or concerns    Mario Hernandez RN [facility]

## 2018-03-27 NOTE — PROGRESS NOTES
Call from tele that patient HR is 150's. Patient trying to get out of bed. Will continue to monitor. 0925:  at this time. Patient resting in bed. Tele-sitter placed in room, patient confused and pulling at lines.

## 2018-03-27 NOTE — PROGRESS NOTES
9:00 PM Patient arrived to room 2136 via stretcher. VSS. Assessment complete. Daughter states she is exhausted and is going home. Patient oriented to room and call bell system. No evidence or learning demonstrated. Will continue to reorient patient. Blood transfusing still infusing w/o difficulty noted. Will continue to monitor closely. 9:40 PM Spoke with Dr. Donta Batista and orders received to change tylenol from PO to suppository. See MAR. Orders also received for sitter PRN. Within 30 minutes of arrival on floor patient already attempting to pull out IV with blood infusing. Will continue to monitor. Primary Nurse Miranda Andino, STEF and Brandee Fitzgerald RN performed a dual skin assessment on this patient No impairment noted. Sacrum pink - blanchable. Patient turned to right side. PMT consult placed. Ronaldo score is 12    7:50 AM Received call from [facility] lab. Patient w/ + blood cultures in 1 of 2 bottles growing gram negative rods. Oncoming RN, Sandra Partida to make attending aware.

## 2018-03-27 NOTE — PROGRESS NOTES
9:00 PM Patient arrived to room 2136 via stretcher. VSS. Assessment complete. Daughter states she is exhausted and is going home. Patient oriented to room and call bell system. No evidence or learning demonstrated. Will continue to reorient patient. Blood transfusing still infusing w/o difficulty noted. Will continue to monitor closely. 9:40 PM Spoke with Dr. Abi Higuera and orders received to change tylenol from PO to suppository. See MAR. Orders also received for sitter PRN. Within 30 minutes of arrival on floor patient already attempting to pull out IV with blood infusing. Will continue to monitor. Primary Nurse Guerrero Rabago, STEF and Karlie Kee RN performed a dual skin assessment on this patient No impairment noted. Sacrum pink - blanchable. Patient turned to right side. PMT consult placed.   Ronaldo score is 12

## 2018-03-27 NOTE — PROGRESS NOTES
TRANSFER - IN REPORT:    Verbal report received from Carroll Bonner RN (name) on Vida Mcneal  being received from ED (unit) for routine progression of care      Report consisted of patients Situation, Background, Assessment and   Recommendations(SBAR). Information from the following report(s) SBAR, Kardex, Intake/Output, MAR, Recent Results, Med Rec Status and Cardiac Rhythm NSR/ST was reviewed with the receiving nurse. Opportunity for questions and clarification was provided. Assessment completed upon patients arrival to unit and care assumed.

## 2018-03-27 NOTE — ED NOTES
TRANSFER - OUT REPORT:    Verbal report given to Swapna Nolasco RN (name) on Rolena Wedgefield  being transferred to gen surg (unit) for routine progression of care       Report consisted of patients Situation, Background, Assessment and   Recommendations(SBAR). Information from the following report(s) SBAR, Kardex, ED Summary, Intake/Output, MAR, Recent Results and Cardiac Rhythm sinus tach, NSR was reviewed with the receiving nurse. Lines:   Peripheral IV 03/26/18 Right Forearm (Active)       Peripheral IV 03/26/18 Left Forearm (Active)   Site Assessment Clean, dry, & intact 3/26/2018  5:21 PM        Opportunity for questions and clarification was provided.       Patient transported with:   Registered nurse

## 2018-03-27 NOTE — PROGRESS NOTES
03/27/18 1948   Vitals   Temp 98.7 °F (37.1 °C)   Temp Source Oral   Pulse (Heart Rate) (!) 112   Resp Rate 18   O2 Sat (%) 97 %   Level of Consciousness Alert   /77   MAP (Calculated) 98   BP 1 Location Left arm   BP 1 Method Automatic   BP Patient Position At rest   Cardiac Rhythm Sinus Tach   MEWS Score 3   Height/Weight   Weight (refused)   Oxygen Therapy   O2 Device Room air   Pain 1   Pain Scale 1 Numeric (0 - 10)   Pain Intensity 1 0   Patient Stated Pain Goal 0   MEWS = 3 d/t continued tachycardia. MD aware. Will continue to monitor closely.

## 2018-03-27 NOTE — PROGRESS NOTES
Hospitalist Progress Note    NAME: Angela Ashley   :  1934   MRN:  [id number]       Assessment / Plan:  Acute on chronic anemia and leukopenia POA  -heme occult negative  -pt is chronically low with last hgb ~ 6.7 when last discharged in 18  -s/p 1 unit PRBC   -f/u with iron panel, B12, folate  -discussed oncology evaluation with pt's daughter with possible BM biopsy. Daughter declined as she wish for no aggressive/invasive procedure. Daughter wish to have pt discharge home as soon as possible. She states she attempted to discuss with pt's PCP today in regards to home with hospice, however was sent here for evaluation. Hospice to meet pt/family today- likely DC in 24 hrs with Home hospice on PO Abx as discussed with daughter (RN) today    Sepsis, POA  Gram neg bacteremia POA  UTI POA  -follow urine cx, Bcx  -cont' rocephin for now till pt going home on hospice- will change to PO Abx  -IVF     Recently diagnosed Acute L leg DVT POA  -will hold Eliquis for anemia- pt/family decided against resuming AC, planning to go home on hospice  -discussed with possibility of IVC filter placement, but daughter declined. She understand that DVT can lead to PE. Daughter is willing to take that risk if pt cannot be AC.     Hx of adenocarcinoma of cecum   -follows with Dr Jerardo Del Valle. Per chart review, she has an excellent long term prognosis. No role of adjuvant chemotherapy.   rec obs with routine surveillance scans     Severe dementia with hx of   Anxiety and agitation, per daughter, difficult to control  -cont' home xanax  -haldol prn  -may need sitter      HTN  -resume BB  -hydralazine prn     CKD 3  -cr stable, monitor cr     Anxiety     Code Status: DNR  Surrogate Decision Maker:  Pt's daughter  DVT Prophylaxis: took Eliquis yesterday Unable to do SCDs due to new L leg DVT  GI Prophylaxis: not indicated  Recommended Disposition: Home Hospice in 24 hrs once arranged after meeting today Afternoon Subjective:     Chief Complaint / Reason for Physician Visit: F/u Anemia, UTI, Gram neg bacteremia, severe dementia  \"I am fine\". Discussed with RN events overnight. Review of Systems:  Symptom Y/N Comments  Symptom Y/N Comments   Fever/Chills    Chest Pain     Poor Appetite    Edema     Cough    Abdominal Pain     Sputum    Joint Pain     SOB/HENDERSON    Pruritis/Rash     Nausea/vomit    Tolerating PT/OT     Diarrhea    Tolerating Diet     Constipation    Other       Could NOT obtain due to: Dementia     Objective:     VITALS:   Last 24hrs VS reviewed since prior progress note. Most recent are:  Patient Vitals for the past 24 hrs:   Temp Pulse Resp BP SpO2   03/27/18 1511 98.8 °F (37.1 °C) 70 18 142/75 97 %   03/27/18 1235 98.4 °F (36.9 °C) (!) 109 20 126/73 97 %   03/27/18 0910 97.7 °F (36.5 °C) (!) 107 18 133/85 100 %   03/27/18 0249 97.7 °F (36.5 °C) (!) 108 18 134/85 93 %   03/27/18 0028 97.5 °F (36.4 °C) (!) 55 18 124/71 97 %   03/26/18 2249 98.1 °F (36.7 °C) (!) 106 18 118/64 95 %   03/26/18 2222 98.3 °F (36.8 °C) (!) 107 18 118/66 97 %   03/26/18 2131 99.6 °F (37.6 °C) 78 18 127/53 97 %   03/26/18 2101 98.9 °F (37.2 °C) 76 18 124/56 100 %   03/26/18 2039 - - - - 100 %   03/26/18 2023 99.2 °F (37.3 °C) 82 14 (!) 148/102 92 %   03/26/18 2015 - - - 149/73 -   03/26/18 2012 - (!) 105 14 (!) 132/93 -   03/26/18 2004 98.6 °F (37 °C) (!) 103 18 (!) 132/93 92 %   03/26/18 2003 - - - (!) 141/95 -   03/26/18 1949 98.6 °F (37 °C) (!) 112 18 (!) 141/95 95 %   03/26/18 1947 - (!) 136 - - 95 %   03/26/18 1915 - (!) 110 - - 96 %   03/26/18 1633 (!) 102 °F (38.9 °C) - - - -   03/26/18 1608 - - - - 98 %       Intake/Output Summary (Last 24 hours) at 03/27/18 1557  Last data filed at 03/27/18 0249   Gross per 24 hour   Intake            753.8 ml   Output              100 ml   Net            653.8 ml        PHYSICAL EXAM:  General: WD, WN. Alert, cooperative, no acute distress    EENT:  EOMI. Anicteric sclerae. MMM  Resp:  CTA bilaterally, no wheezing or rales. No accessory muscle use  CV:  Regular  rhythm,  No edema  GI:  Soft, Non distended, Non tender.  +Bowel sounds  Neurologic:  Alert and oriented X 3, normal speech,   Psych:   Good insight. Not anxious nor agitated  Skin:  No rashes. No jaundice    Reviewed most current lab test results and cultures  YES  Reviewed most current radiology test results   YES  Review and summation of old records today    NO  Reviewed patient's current orders and MAR    YES  PMH/SH reviewed - no change compared to H&P  ________________________________________________________________________  Care Plan discussed with:    Comments   Patient x    Family  x Daughter at bedside   RN x    Care Manager x    Consultant                        Multidiciplinary team rounds were held today with , nursing, pharmacist and clinical coordinator. Patient's plan of care was discussed; medications were reviewed and discharge planning was addressed. ________________________________________________________________________  Total NON critical care TIME:  26   Minutes    Total CRITICAL CARE TIME Spent:   Minutes non procedure based      Comments   >50% of visit spent in counseling and coordination of care     ________________________________________________________________________  Bowen Colon MD     Procedures: see electronic medical records for all procedures/Xrays and details which were not copied into this note but were reviewed prior to creation of Plan. LABS:  I reviewed today's most current labs and imaging studies.   Pertinent labs include:  Recent Labs      03/27/18   0140  03/26/18   1605   WBC  3.5*  3.5*   HGB  7.6*  6.0*   HCT  24.1*  18.4*   PLT  272  309     Recent Labs      03/27/18   0140  03/26/18   1605   NA  142  141   K  3.5  3.9   CL  110*  107   CO2  25  25   GLU  109*  115*   BUN  18  19   CREA  1.07*  1.23*   CA  7.8*  8.4*   ALB   --   3.3*   TBILI   -- 0. 7   SGOT   --   24   ALT   --   13   INR   --   1.5*       Signed: Earl Campbell MD

## 2018-03-27 NOTE — ROUTINE PROCESS
1511- MEWS score 3. Patient is confused, which is baseline for her. She is resting comfortably in bed. Tele monitor sitter and daughter at bedside. 1235- MEWS score 4. Patient is tachycardic at 109 and confused. No s/s of distress. Tele monitor sitter at bedside. Will continue to monitor. 8343- MEWS score of 4. Patient is tachycardic at 107 and confused (which is baseline for her). No s/s of distress. Tele monitor sitter at bedside. Will continue to monitor.

## 2018-03-27 NOTE — CDMP QUERY
2.   The diagnosis of recent DVT has been rendered for this patient. The medical record reflects that this was originally diagnosed on 3/20/18. Based on references provided below can this be further specified as acute L lower leg DVT    => Acute DVT of L lower leg  => Other explanation of clinical findings  => Clinically Undetermined (no explanation for clinical findings)    The medical record reflects the following clinical findings, treatment, and risk factors. 79 y/o white female admitted for sepsis 2/2 UTI and anemia. The diagnosis of recent L leg DVT is noted. Medical record reveals this was just diagnosed on 3/20/18. REFERENCE  Acute DVT:  \"Acute\" defines the period of time beginning with the initial diagnosis, up to and including the entire period of time where anticoagulation is instituted (3-12 months)     Acute Recurrent DVT: \"Recurrent\" incorporates the definition of acute with the diagnosis of recurrent and ends 3-12 months beyond that time. These patients will likely require lifelong anticoagulation therapy. Chronic DVT:  \"Chronic\" treatment period extending beyond initial 12 months of treatment. Medical condition still exists and is actively being treated. Personal History of DVT: explains the patient's past medical condition that no longer exists, and is not receiving any treatment, but that has the potential for recurrence, and therefore may require monitoring    Please clarify and document your clinical opinion in the progress notes and discharge summary including the definitive and/or presumptive diagnosis, (suspected or probable), related to the above clinical findings. Please include clinical findings supporting your diagnosis. Thanks for your time.     Cuba Mayen RN, BSN  [phone]

## 2018-03-27 NOTE — PROGRESS NOTES
*CM acknowledged consult*. CM will send a referral to hospice and contact hospice, via telephone. CM will speak with pt's family regarding pt's d/c needs. CM spoke with NN: Ammon Foster in regards to pt. It was reported that pt's daughter is a nurse and does not want pt to go to facility. CM will continue to update NN, in regards to pt's care and needs. UPDATE: 12:24PM    CM contacted Hospice, via telephone to follow up with referral that was sent and telephone call. CM spoke with  and it was reported that an hospice worker will contact family. CM will inform MD, via FYI and family.       NATIVIDAD Isabel   [phone]

## 2018-03-27 NOTE — HOSPICE
[facility] follow up on consultation visit note    Patient may need DDNR signed by MD prior to transport    Order for consult noted. History and events of this hospitalization reviewed. Met with patient and daughter Howard Acuña. Support given and Placido Ramirez reviewed the last 2 years of caring for her mother and of how difficult that is at times  Ms Mireya Ramos regularly starts getting agitated about 4 in the afternoon, hollers, shakes and sometimes cries for hours, goes to bed, sometimes sleeps well and sometimes not so well. Then patient is calm and cooperative in the morning. Placido Ramirez thinks it would be best for transport to be arranged at discharge  this time as her mother is much more weak. I advised Placido Ramirez that we could probably admit her mother at home tomorrow yet it may need to be 3/29 and she said that would be \"just fine. \"  If this is ok with Dr. Dillon Wynne    Please arrange transport for around 1100 if possible as we may be able to admit 3/28 in the afternoon. Hospice intake at [phone] will coordinate with CM at [address] [facility] to advise    DME needs are hospital bed, geomatt, full rails  They have elevated toilet and transport chair  These items ordered for delivery by 1030 on 3/28 from [facility]. We will follow up with daughter Placido Ramirez and CM in the morning. Daughter said she would like for Dr. Josh Garcia to follow as hospice AT MD.      Thank you for asking us to be a part of the care for this loved lady and her family.     Please call [phone] if questions or concerns    Pamela Pan RN [facility]

## 2018-03-28 NOTE — DISCHARGE INSTRUCTIONS
HOSPITALIST DISCHARGE INSTRUCTIONS    NAME: Maikel Click   :  1934   MRN:  [id number]     Date/Time:  3/28/2018 9:06 AM    ADMIT DATE: 3/26/2018     DISCHARGE DATE: 3/28/2018     DISCHARGE DIAGNOSIS:  Sepsis, POA - resolved  Gram neg bacteremia POA - Cx results pending but growing Gram neg rods, suspected from urinary source   Gram neg UTI POA- s/p Rocephin IV x 2 doses in hospital before DC home on Hospice, cont Po Abx as prescribed now  Acute on chronic anemia and leukopenia POA - s/p 1 unit PRBC this admission in ER  Recently diagnosed Acute L leg DVT POA - pt/family has opted for hospice & against systemic anticoagulation -- will DC Eliquis on DC today  Severe dementia with hx of sundowning  Anxiety and agitation, per daughter, difficult to control  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 · Keep your medication in the bottles provided by the pharmacist and keep a list of the medication names, dosages, and times to be taken in your wallet. · Do not take other medications without consulting your doctor. Pain Management: per above medications    What to do at [address]:  Comfort Diet as per hospice philosophy     Recommended activity: Activity as tolerated    If you have questions regarding the hospital related prescriptions or hospital related issues please call [facility] at .       Follow Up:  Dr. Della Reid, NP  As needed for any Hospice orders  Home hospice as arranged by  today      Information obtained by :  I understand that if any problems occur once I am at home I am to contact my physician. I understand and acknowledge receipt of the instructions indicated above.                                                                                                                                            Physician's or R.N.'s Signature                                                                  Date/Time                                                                                                                                              Patient or Representative Signature                                                          Date/Time

## 2018-03-28 NOTE — PROGRESS NOTES
Pt will be d/c on today and transported home, via hospice. CM will give pt's nurse information. Pt is scheduled transportation today at 11:30am    Care Management Interventions  PCP Verified by CM: Yes  Mode of Transport at Discharge: Other (see comment)  Transition of Care Consult (CM Consult): Other, [place]: Yes  Discharge Durable Medical Equipment: No  Physical Therapy Consult: No  Occupational Therapy Consult: No  Speech Therapy Consult: No  Current Support Network:  Other  Confirm Follow Up Transport: Family  Plan discussed with Pt/Family/Caregiver: Yes  Discharge Location  Discharge Placement: [facility] [address], MSW   [phone]

## 2018-03-28 NOTE — PROGRESS NOTES
CM contact Hospice liason: Simi Carney, via telephone. CM wanted to clarify arrangements for hospice.   MEJIA will schedule appointment once received clarity from [address] [facility] MEJIA  [phone]

## 2018-03-28 NOTE — HOSPICE
[facility]  [phone]    Inpatient Nursing PRE Admission   Patient Name: Chanda Holguin  YOB: 1934  Age: 80 y.o. Date of PLANNED Hospice Admission: 2018  Hospice Attending:Dr.Aparna Jonathon Scott  Primary Care Physician: Hector Pradhan NP     Home Hospice Zip Code: [address]  Expected  (if patient transferred to [facility]): TBD    ADVANCE CARE PLANNING    Code Status:DNR  Durable DNR: [x]  Yes  []  No  Advance Care Planning 2016   Patient's Healthcare Decision Maker is: Legal Next of Evelyne 69   Primary Decision Maker Name Aaron Hurst   Primary Decision Maker Phone Number [phone]   Primary Decision Maker Relationship to Patient -   Confirm Advance Directive None       Religious: NO Cheondoism   Home: TBD    HOSPICE SUMMARY   ER Visits/ Hospitalizations in past year: 2  Hospice Diagnosis: Protein calorie malnutrition  Onset Date of Hospice Diagnosis:   Summary of Disease Progression Leading to Genesis Gao is a 80 y.o.  female with PMHx significant for severe dementia, leukopenia, anemia, recent L leg DVT on Eliquis, present to the ED for further evaluation of acute on chronic anemia with hgb 6. Per daughter, pt was at PCP today for routine visit, found to have hgb of 6, but was asymptomatic. Pt  was sent to the ER for further evaluation with concerns of acute blood loss while on Eliquis. No associated cp, sob, palpitations, n/v/d. Anemia appears to be chronic, however daughter states had never been workup. In speaking with pt's daughter, she wish for no aggressive measure/ no invasive procedure. Daughter does not wish to have further work up in regards to anemia. Vitals: T101.9, P71, /95, SpO2 98% on RAPertinent las: CBC 3.5, hgb 6.0, , UA consistent with UTI, heme occult neg. Patient with progressive dementia, has lost 15 lbs in less than six months with a low BMI.  Appetite is very decreased due to lewy body dementia. Co-Morbidities:   Patient Active Problem List   Diagnosis Code    Hypercholesterolemia E78.00    MVP (mitral valve prolapse) I34.1    DDD (degenerative disc disease), cervical M50.30    Esophagitis 530.1    Encounter for long-term (current) use of other medications Z79.899    MR (mitral regurgitation) I34.0    Essential hypertension I10    SBO (small bowel obstruction) K56.609    Depression F32.9    Intussusception of small intestine (HCC) K56.1    Intussusception, ileocecal ([address] K56.1    Malignant neoplasm of ascending colon (HCC) C18.2    UTI (urinary tract infection) N39.0    Sepsis (HCC) A41.9    Hypotension I95.9    Dementia with behavioral disturbance F03.91    Anemia D64.9    Left leg DVT (HCC) I82.402    Gram-negative bacteremia R78.81     Diagnoses RELATED to the terminal prognosis: Mitrovalve prolapse, MR, HTN, SBO malignant neoplasm of colon  Other Diagnoses: left leg DVT, anemia, sepsis, depression    Rationale for a prognosis of life expectancy of 6 months or less if the disease follows its normal course (Disease Specific History):     Tao Ng is a 80 y.o. who was admitted to [facility]. The patient's principle diagnosis of protein calorie malnutrition  has resulted in generalized weakness  Functionally, the patient's Palliative Performance Scale has declined over a period of one month and is estimated at 30. Objective information that support this patients limited prognosis includes:     Results for Carly Marcelo (MRN [id number]) as of 3/28/2018 09:21   Ref.  Range 3/27/2018 01:40   WBC Latest Ref Range: 3.6 - 11.0 K/uL 3.5 (L)   NRBC Latest Ref Range: 0  WBC 0.0   RBC Latest Ref Range: 3.80 - 5.20 M/uL 2.38 (L)   HGB Latest Ref Range: 11.5 - 16.0 g/dL 7.6 (L)   HCT Latest Ref Range: 35.0 - 47.0 % 24.1 (L)   MCV Latest Ref Range: 80.0 - 99.0 .3 (H)   MCH Latest Ref Range: 26.0 - 34.0 PG 31.9   MCHC Latest Ref Range: 30.0 - 36.5 g/dL 31.5   RDW Latest Ref Range: 11.5 - 14.5 % 19.7 (H)   PLATELET Latest Ref Range: 150 - 400 K/uL 272   MPV Latest Ref Range: 8.9 - 12.9 FL 9.5   NEUTROPHILS Latest Ref Range: 32 - 75 % 35   LYMPHOCYTES Latest Ref Range: 12 - 49 % 56 (H)   MONOCYTES Latest Ref Range: 5 - 13 % 7   EOSINOPHILS Latest Ref Range: 0 - 7 % 0   BASOPHILS Latest Ref Range: 0 - 1 % 1   IMMATURE GRANULOCYTES Latest Ref Range: 0.0 - 0.5 % 0   DF Latest Units:   AUTOMATED   ABSOLUTE NRBC Latest Ref Range: 0.00 - 0.01 K/uL 0.00   ABS. NEUTROPHILS Latest Ref Range: 1.8 - 8.0 K/UL 1.2 (L)   ABS. IMM. GRANS. Latest Ref Range: 0.00 - 0.04 K/UL 0.0   ABS. LYMPHOCYTES Latest Ref Range: 0.8 - 3.5 K/UL 2.0   ABS. MONOCYTES Latest Ref Range: 0.0 - 1.0 K/UL 0.3   ABS. EOSINOPHILS Latest Ref Range: 0.0 - 0.4 K/UL 0.0   ABS. BASOPHILS Latest Ref Range: 0.0 - 0.1 K/UL 0.0     Results for Guy Bush (MRN [id number]) as of 3/28/2018 09:21   Ref.  Range 2/27/2018 12:01 3/26/2018 14:03 3/26/2018 16:05 3/27/2018 01:40   Sodium Latest Ref Range: 136 - 145 mmol/L 137  141 142   Potassium Latest Ref Range: 3.5 - 5.1 mmol/L 3.9  3.9 3.5   Chloride Latest Ref Range: 97 - 108 mmol/L 98  107 110 (H)   CO2 Latest Ref Range: 21 - 32 mmol/L 25  25 25   Anion gap Latest Ref Range: 5 - 15 mmol/L   9 7   Glucose Latest Ref Range: 65 - 100 mg/dL 100 (H)  115 (H) 109 (H)   BUN Latest Ref Range: 6 - 20 MG/DL 19  19 18   Creatinine Latest Ref Range: 0.55 - 1.02 MG/DL 1.10 (H)  1.23 (H) 1.07 (H)   BUN/Creatinine ratio Latest Ref Range: 12 - 20   17  15 17   Calcium Latest Ref Range: 8.5 - 10.1 MG/DL 8.2 (L)  8.4 (L) 7.8 (L)   GFR est non-AA Latest Ref Range: >60 ml/min/1.73m2 47 (L)  42 (L) 49 (L)   GFR est AA Latest Ref Range: >60 ml/min/1.73m2 54 (L)  51 (L) 59 (L)   Bilirubin, total Latest Ref Range: 0.2 - 1.0 MG/DL 0.4  0.7    Protein, total Latest Ref Range: 6.4 - 8.2 g/dL 6.7  7.3    Albumin Latest Ref Range: 3.5 - 5.0 g/dL 3.6  3.3 (L)    Globulin Latest Ref Range: 2.0 - 4.0 g/dL   4.0    A-G Ratio Latest Ref Range: 1.1 - 2.2   1.2  0.8 (L)    ALT (SGPT) Latest Ref Range: 12 - 78 U/L 13  13    AST Latest Ref Range: 15 - 37 U/L 14  24    Alk. phosphatase Latest Ref Range: 45 - 117 U/L 86  96    Lactic acid Latest Ref Range: 0.4 - 2.0 MMOL/L   1.5    Vitamin B12 Latest Ref Range: 232 - 1245 pg/mL  411     Folate Latest Ref Range: >3.0 ng/mL  7.2     Iron Latest Ref Range: 27 - 139 ug/dL  13 (L)     TIBC Latest Ref Range: 250 - 450 ug/dL  192 (L)     Iron % saturation Latest Ref Range: 15 - 55 %  7 (LL)     UIBC Latest Ref Range: 118 - 369 ug/dL  179     Ferritin Latest Ref Range: 15 - 150 ng/mL  356 (H)       High: Bactrim [Sulfamethoxazole-trimethoprim]       Result Information      Status Provider Status        Final result (Exam End: 3/20/2018  4:54 PM) Reviewed        Study Result      INDICATION:  Leg swelling, pain, DVT suspected     COMPARISON: None.     FINDINGS: Duplex Doppler sonography of the left lower extreme was performed from  the groin to the calf. There is evidence of DVT left common femoral, superficial  femoral, popliteal and calf veins. Right common femoral vein is patent. Results  were called to Dr. Mary Knight. Patient was taken to the emergency department. .     IMPRESSION  IMPRESSION:  Extensive left DVT. Patient was taken to the emergency department.        Hx of adenocarcinoma of cecum   -follows with Dr Bernadine Page.  Per chart review, she has an excellent long term prognosis.  No role of adjuvant chemotherapy.      Patient has been on a cardiac diet here at hospital but eating very little. The patient/family chose comfort measures with the support of Hospice. Patient meets for Routine  LOC as evidenced by vitals are stable, on room air, still able to swallow pills, food.      Verbal certification of terminal diagnosis with life expectancy of 6 months or less received from: Dr. Irving Arenas    Prognosis estimated based on 03/28/18 clinical assessment is:   [] Few to Many Hours  [] Hours to Days   [] Few to Many Days   [x] Days to Weeks   [] Few to Many Weeks   [] Weeks to Months   [] Few to Many Months    CLINICAL INFORMATION   Allergies: Allergies   Allergen Reactions    Bactrim [Sulfamethoxazole-Trimethoprim] Rash       Wt Readings from Last 3 Encounters:   03/26/18 48.6 kg (107 lb 3.2 oz)   03/20/18 48.5 kg (107 lb)   03/20/18 48.8 kg (107 lb 9.6 oz)     Ht Readings from Last 3 Encounters:   03/26/18 5' 5\" (1.651 m)   03/26/18 5' 5\" (1.651 m)   03/20/18 5' 5\" (1.651 m)     Body mass index is 17.61 kg/(m^2). Visit Vitals    /77 (BP 1 Location: Left arm, BP Patient Position: At rest)    Pulse (!) 104    Temp 98.2 °F (36.8 °C)    Resp 18    Ht 5' 5\" (1.651 m)    Wt 48 kg (105 lb 13.1 oz)    SpO2 98%    BMI 17.61 kg/m2       LAB VALUES  No results found for this visit on 03/26/18 (from the past 12 hour(s)). No results found for this visit on 03/26/18 (from the past 6 hour(s)). Lab Results   Component Value Date/Time    Protein, total 7.3 03/26/2018 04:05 PM    Albumin 3.3 (L) 03/26/2018 04:05 PM       Currently this patient has:  [] Supplemental O2 [x] Peripheral IV  [] PICC    [] PORT   [] Villanueva Catheter [] NG Tube   [] PEG Tube [] Ostomy    [] AICD: Has ICD been deactivated? [] Yes [] No:______    Progression to DEPENDENCE WITH ADLs (include time frame): today  x- Dependent for bathing: personal hygiene and grooming   x- Dependent for dressing: dressing and undressing   x- Dependent for transferring: movement and mobility   -x Dependent for toileting: continence-related tasks including control and hygiene   -x Dependent for eating: preparing food and feeding    ASSESSMENT & PLAN     1. Symptom Issues Identified: tachycardic, vitals stable, no complaints of pain, weakness, debility, altered mental status    2. Spiritual Issues Identified: none at this time    3.   Psych/ Social/ Emotional Issues Identified: support for family taking care of patient. 4.  Care Coordination:   Transfer to: home                    Report given to: admitting nurse via this report    Transportation by: ambulance     Scheduled for 1130 3/28/18    Medications Needs:     Current Discharge Medication List             START taking these medications     Details   cephALEXin (KEFLEX) 500 mg capsule Take 1 Cap by mouth four (4) times daily for 10 days. Qty: 40 Cap, Refills: 0                 CONTINUE these medications which have NOT CHANGED     Details   metoprolol succinate (TOPROL-XL) 50 mg XL tablet Take 1 Tab by mouth daily. Qty: 30 Tab, Refills: 5     Associated Diagnoses: Essential hypertension with goal blood pressure less than 140/90       sertraline (ZOLOFT) 50 mg tablet Take 75 mg by mouth daily.       ALPRAZolam (XANAX) 0.25 mg tablet TAKE ONE TABLET BY MOUTH TWICE DAILY AS NEEDED FOR ANXIETY. MAX OF 2 TABLETS DAILY  Indications: anxiety  Qty: 60 Tab, Refills: 3     Associated Diagnoses: Depression, unspecified depression type                STOP taking these medications         apixaban (ELIQUIS) 5 mg tablet Comments:   Reason for Stopping:                      DME: ordered through [employer] supply by Ethan Monterroso DME ordered included: hospital bed, geomatt, full rails to be delivered on 3/28/18 between 11-12noon. Supplies: briefs, and chux.

## 2018-03-28 NOTE — HOSPICE
[facility] liaison note:    I spoke with son in law HannahRadu, equipment was ordered for delivery between 11-noon today, patient to be discharged and transported home at [facility]. Admission to hospice will be tomorrow morning. Family is ok with this plan.      Thank you,    [address] [place] [address] Charlyaretha Liaison

## 2018-03-28 NOTE — DISCHARGE SUMMARY
Hospitalist Discharge Summary     Patient ID:  Marsha Cruz  [id number]  80 y.o.  1934    PCP on record: Lamine Hahn NP    Admit date: 3/26/2018  Discharge date and time: 3/28/2018      DISCHARGE DIAGNOSIS:    Sepsis, POA - resolved  Gram neg bacteremia POA - Cx results pending but growing Gram neg rods, suspected from urinary source   Gram neg UTI POA- s/p Rocephin IV x 2 doses in hospital before DC home on Hospice, cont Po Abx as prescribed now  Acute on chronic anemia and leukopenia POA - s/p 1 unit PRBC this admission in ER  Recently diagnosed Acute L leg DVT POA - pt/family has opted for hospice & against systemic anticoagulation -- will DC Eliquis on DC today  Severe dementia with hx of sundowning  Anxiety and agitation, per daughter, difficult to control  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  female with PMHx significant for severe dementia, leukopenia, anemia, recent L leg DVT on Eliquis, present to the ED for further evaluation of acute on chronic anemia with hgb 6. Per daughter, pt was at PCP today for routine visit, found to have hgb of 6, but was asymptomatic. Pt  was sent to the ER for further evaluation with concerns of acute blood loss while on Eliquis. No associated cp, sob, palpitations, n/v/d. Anemia appears to be chronic, however daughter states had never been workup. In speaking with pt's daughter, she wish for no aggressive measure/ no invasive procedure. Daughter does not wish to have further work up in regards to anemia. In the ER, pt was anxious, at baseline confusion. Daughter states she wish to have patient discharge to home the sooner the better.   Vitals: T101.9, P71, /95, SpO2 98% on RA  Pertinent las: CBC 3.5, hgb 6.0, , UA consistent with UTI, heme occult neg  We were asked to admit for work up and evaluation of the above problems\"    ______________________________________________________________________  DISCHARGE SUMMARY/HOSPITAL COURSE:  for full details see H&P, daily progress notes, labs, consult notes. Acute on chronic anemia and leukopenia POA  -heme occult negative  -pt is chronically low with last hgb ~ 6.7 when last discharged in 2/17/18  -s/p 1 unit PRBC   -f/u with iron panel, B12, folate  -discussed oncology evaluation with pt's daughter with possible BM biopsy. Concetta Almanzar declined as she wish for no aggressive/invasive procedure.    Daughter wish to have pt discharge home as soon as possible.  She states she attempted to discuss with pt's PCP today in regards to home with hospice, however was sent here for evaluation. Hospice to meet pt/family today- likely DC in 24 hrs with Home hospice on PO Abx as discussed with daughter (RN) today     Sepsis, POA  Gram neg bacteremia POA  UTI POA  -follow urine cx, Bcx  -cont' rocephin for now till pt going home on hospice- will change to PO Abx  -IVF      Recently diagnosed Acute L leg DVT POA  -will hold Eliquis for anemia- pt/family decided against resuming AC, planning to go home on hospice  -discussed with possibility of IVC filter placement, but daughter declined.  She understand that DVT can lead to PE.  Daughter is willing to take that risk if pt cannot be AC.      Hx of adenocarcinoma of cecum   -follows with Dr Mehreen Rutledge.  Per chart review, she has an excellent long term prognosis.  No role of adjuvant chemotherapy.  rec obs with routine surveillance scans      Severe dementia with hx of sundowning  Anxiety and agitation, per daughter, difficult to control  -cont' home xanax  -haldol prn  -may need sitter       HTN  -resume BB  -hydralazine prn      CKD 3  -cr stable, monitor cr      Anxiety      Code Status: DNR        _______________________________________________________________________  Patient seen and examined by me on discharge day.   Pertinent Findings:  Gen: Not in distress  Chest: Clear lungs  CVS:   Regular rhythm. No edema  Abd:  Soft, not distended, not tender  Neuro:  Alert, oriented x 1  _______________________________________________________________________  DISCHARGE MEDICATIONS:   Current Discharge Medication List      START taking these medications    Details   cephALEXin (KEFLEX) 500 mg capsule Take 1 Cap by mouth four (4) times daily for 10 days. Qty: 40 Cap, Refills: 0         CONTINUE these medications which have NOT CHANGED    Details   metoprolol succinate (TOPROL-XL) 50 mg XL tablet Take 1 Tab by mouth daily. Qty: 30 Tab, Refills: 5    Associated Diagnoses: Essential hypertension with goal blood pressure less than 140/90      sertraline (ZOLOFT) 50 mg tablet Take 75 mg by mouth daily. ALPRAZolam (XANAX) 0.25 mg tablet TAKE ONE TABLET BY MOUTH TWICE DAILY AS NEEDED FOR ANXIETY. MAX OF 2 TABLETS DAILY  Indications: anxiety  Qty: 60 Tab, Refills: 3    Associated Diagnoses: Depression, unspecified depression type         STOP taking these medications       apixaban (ELIQUIS) 5 mg tablet Comments:   Reason for Stopping:               My Recommended Diet, Activity, Wound Care, and follow-up labs are listed in the patient's Discharge Insturctions which I have personally completed and reviewed.     ______________________________________________________________________    Risk of deterioration: Moderate    Condition at Discharge:  Stable  ______________________________________________________________________    Disposition  Home with hospice services  ______________________________________________________________________    Care Plan discussed with:   Patient, Family, RN, Care Manager    ______________________________________________________________________    Code Status: DNR/DNI  ______________________________________________________________________      Follow up with:   PCP : Jesus Carrillo NP  Follow-up Information     Follow up With Details Comments Contact Info    Glenis Varela NP   [address]  [phone]                Total time in minutes spent coordinating this discharge (includes going over instructions, follow-up, prescriptions, and preparing report for sign off to her PCP) :  26 minutes    Signed:  Scotty Infante MD

## 2018-03-29 NOTE — PROGRESS NOTES
Patient discharged from [facility] on 3-28-18 with discharge plan to home with hospice. Reviewed notes and [facility] to open patient this am to their home hospice program.  DME equipment ordered. [facility] and she verified the plan. She needed me to determine if our PCP would follow or if their hospice attending (Dr. Ryan [place]) would be following. Spoke with Vianney Vargas (Nurse for Sanju Echeverria NP) and it was determined that Sanju Echeverria NP would not continue to follow. I called and spoke with Arsenio later in the afternoon to inform her the final decision was that Sanju Echeverria NP would not continue to follow so that Dr. Ryan [place] would now be her attending Physician. She informed me that they were actually not able to open the case today as the POA/Daughter was not in the home as planned for the admission today. Therefore, the hospice admission will now take place tomorrow when she is available for the paperwork.

## 2018-03-30 PROBLEM — S72.009A HIP FRACTURE (HCC): Status: ACTIVE | Noted: 2018-01-01

## 2018-03-30 NOTE — CONSULTS
ORTHOPAEDIC CONSULT NOTE    Subjective:     Date of Consultation:  March 30, 2018      Royce Shone is a 80 y.o. female who is being seen for R hip pain s/p GLF this AM, per daughter the pt was found on the floor of the BR this AM. Work up has reveled R fem neck fracture. Pt has dementia and was admitted 3/26-3/28 for anemia, UTI with bacteremia, and leukocytosis, her treatment during admission included stopping the eliquis on 3/26, transfusions, IV rocephin x 2 with po abx for 10 days, and hospice consult. Pt lives at home with her daughter and uses a cane to assist with ambulation. Pt's family at bedside during exam. Plan of care discussed with pt and family, all questions/concerns addressed and pt and family verbalized understanding of plan of care.       Patient Active Problem List    Diagnosis Date Noted    Left leg DVT ([address] 03/27/2018    Gram-negative bacteremia 03/27/2018    Dementia with behavioral disturbance 03/26/2018    Anemia 03/26/2018    Hypotension 02/14/2018    UTI (urinary tract infection) 02/13/2018    Sepsis ([address] 02/13/2018    Malignant neoplasm of ascending colon ([address] 08/30/2016    Intussusception, ileocecal ([address] 08/27/2016    Intussusception of small intestine ([address] 08/26/2016    Depression 03/18/2016    SBO (small bowel obstruction) ([address] 10/17/2015    Essential hypertension 06/05/2015    MR (mitral regurgitation)     Encounter for long-term (current) use of other medications 08/05/2010    Hypercholesterolemia     MVP (mitral valve prolapse)     DDD (degenerative disc disease), cervical     Esophagitis      Family History   Problem Relation Age of Onset    Cancer Mother      breast    Hypertension Mother     Diabetes Mother     Cancer Father      head and neck    Hypertension Father     Cancer Brother     Cancer Maternal Aunt       Social History   Substance Use Topics    Smoking status: Former Smoker    Smokeless tobacco: Never Used    Alcohol use No     Past Medical History:   Diagnosis Date    Anemia     Anxiety     DDD (degenerative disc disease), cervical     Depression     Esophagitis     FH: breast cancer     FH: hypertension     Hypercholesterolemia     Hypertension     MR (mitral regurgitation)     MVP (mitral valve prolapse)     Stool color black     Thromboembolus ([address] 03/20/2018      Past Surgical History:   Procedure Laterality Date    ABDOMEN SURGERY PROC UNLISTED  2015     and    2016    Bowel Blockage    DILATE ESOPHAGUS  6/3/2011         ENDOSCOPY, COLON, DIAGNOSTIC      due 2016    HX ABDOMINAL LAPAROSCOPY  10/2015    lap enterolysis for obstructing solitary adhesive band    HX CATARACT REMOVAL      HX GI      esoph dilatation    HX GI  08/27/2016    LAPAROTOMY EXPLORATORY, Ileocecectomy.  HX HYSTERECTOMY      HX ORTHOPAEDIC      R shoulder replacement    AL EGD TRANSORAL BIOPSY SINGLE/MULTIPLE  6/3/2011           Prior to Admission medications    Medication Sig Start Date End Date Taking? Authorizing Provider   cephALEXin (KEFLEX) 500 mg capsule Take 1 Cap by mouth four (4) times daily for 10 days. 3/28/18 4/7/18  Yomaira Humphreys MD   metoprolol succinate (TOPROL-XL) 50 mg XL tablet Take 1 Tab by mouth daily. 2/27/18   Minoo Lara NP   sertraline (ZOLOFT) 50 mg tablet Take 75 mg by mouth daily. Historical Provider   ALPRAZolam (XANAX) 0.25 mg tablet TAKE ONE TABLET BY MOUTH TWICE DAILY AS NEEDED FOR ANXIETY. MAX OF 2 TABLETS DAILY  Indications: anxiety 11/1/17   Minoo Lara NP     Current Facility-Administered Medications   Medication Dose Route Frequency    0.9% sodium chloride infusion 1,000 mL  1,000 mL IntraVENous ONCE     Current Outpatient Prescriptions   Medication Sig    cephALEXin (KEFLEX) 500 mg capsule Take 1 Cap by mouth four (4) times daily for 10 days.  metoprolol succinate (TOPROL-XL) 50 mg XL tablet Take 1 Tab by mouth daily.  sertraline (ZOLOFT) 50 mg tablet Take 75 mg by mouth daily.  ALPRAZolam (XANAX) 0.25 mg tablet TAKE ONE TABLET BY MOUTH TWICE DAILY AS NEEDED FOR ANXIETY. MAX OF 2 TABLETS DAILY  Indications: anxiety      Allergies   Allergen Reactions    Bactrim [Sulfamethoxazole-Trimethoprim] Rash        Review of Systems:  A comprehensive review of systems was negative except for that written in the HPI. Mental Status: severe dementia    Objective:     Patient Vitals for the past 8 hrs:   BP Temp Pulse Resp SpO2   18 1054 (!) 152/93 - - - -   18 1011 106/60 98.1 °F (36.7 °C) - - -   18 1004 - - (!) 57 22 99 %     Temp (24hrs), Av.1 °F (36.7 °C), Min:98.1 °F (36.7 °C), Max:98.1 °F (36.7 °C)      Gen: Well-developed,  in no acute distress   Musc: LLE - +TTP of hip, NVI   + ROM of bilat UE with greater ROM of L than R, NVI   Skin: No skin breakdown noted. Skin warm, pink, dry  Neuro: Cranial nerves are grossly intact, no focal motor weakness, follows commands appropriately   Psych: Good insight, oriented to person, place and time, alert    Imaging Review:  EXAM:  XR HIP RT W OR WO PELV 2-3 VWS     INDICATION:   pain, fall. Patient complains of right hip pain.     COMPARISON: CT 16.     FINDINGS:   AP view of the pelvis with attempted frog leg lateral view of the right hip were  obtained. There is a right subcapital femoral neck fracture with compression and no  definite displacement.   Left hip and bony pelvis are otherwise intact.     IMPRESSION  IMPRESSION: Right femoral neck fracture.             Labs:   Recent Results (from the past 24 hour(s))   CBC WITH AUTOMATED DIFF    Collection Time: 18 11:11 AM   Result Value Ref Range    WBC 1.6 (L) 3.6 - 11.0 K/uL    RBC 2.32 (L) 3.80 - 5.20 M/uL    HGB 7.6 (L) 11.5 - 16.0 g/dL    HCT 23.2 (L) 35.0 - 47.0 %    .0 (H) 80.0 - 99.0 FL    MCH 32.8 26.0 - 34.0 PG    MCHC 32.8 30.0 - 36.5 g/dL    RDW 18.6 (H) 11.5 - 14.5 %    PLATELET 215 663 - 655 K/uL    MPV 10.6 8.9 - 12.9 FL    NRBC 0.0 0  WBC ABSOLUTE NRBC 0.00 0.00 - 0.01 K/uL    NEUTROPHILS 30 (L) 32 - 75 %    LYMPHOCYTES 58 (H) 12 - 49 %    MONOCYTES 9 5 - 13 %    EOSINOPHILS 1 0 - 7 %    BASOPHILS 2 (H) 0 - 1 %    IMMATURE GRANULOCYTES 0 0.0 - 0.5 %    ABS. NEUTROPHILS 0.5 (L) 1.8 - 8.0 K/UL    ABS. LYMPHOCYTES 1.0 0.8 - 3.5 K/UL    ABS. MONOCYTES 0.1 0.0 - 1.0 K/UL    ABS. EOSINOPHILS 0.0 0.0 - 0.4 K/UL    ABS. BASOPHILS 0.0 0.0 - 0.1 K/UL    ABS. IMM.  GRANS. 0.0 0.00 - 0.04 K/UL    DF AUTOMATED      RBC COMMENTS OVALOCYTES  1+        RBC COMMENTS ANISOCYTOSIS  1+       EKG, 12 LEAD, INITIAL    Collection Time: 03/30/18 11:25 AM   Result Value Ref Range    Ventricular Rate 70 BPM    Atrial Rate 70 BPM    P-R Interval 138 ms    QRS Duration 90 ms    Q-T Interval 446 ms    QTC Calculation (Bezet) 481 ms    Calculated P Axis 78 degrees    Calculated R Axis 66 degrees    Calculated T Axis 58 degrees    Diagnosis       Normal sinus rhythm  Left ventricular hypertrophy with repolarization abnormality  When compared with ECG of 26-MAR-2018 16:39,  No significant change  Confirmed by Niels Emerson ([id number]) on 3/30/2018 11:37:34 AM           Impression:     Patient Active Problem List    Diagnosis Date Noted    Left leg DVT ([address] 03/27/2018    Gram-negative bacteremia 03/27/2018    Dementia with behavioral disturbance 03/26/2018    Anemia 03/26/2018    Hypotension 02/14/2018    UTI (urinary tract infection) 02/13/2018    Sepsis ([address] 02/13/2018    Malignant neoplasm of ascending colon ([address] 08/30/2016    Intussusception, ileocecal ([address] 08/27/2016    Intussusception of small intestine ([address] 08/26/2016    Depression 03/18/2016    SBO (small bowel obstruction) ([address] 10/17/2015    Essential hypertension 06/05/2015    MR (mitral regurgitation)     Encounter for long-term (current) use of other medications 08/05/2010    Hypercholesterolemia     MVP (mitral valve prolapse)     DDD (degenerative disc disease), cervical     Esophagitis Active Problems:    * No active hospital problems. *      Plan:   -  Plan for hemiarthroplasty for femur fracture with Dr. Tariq Aguiar today is possible   -  NPO, bedrest, [place], IVF, pain management  -  Clearance pending   -  DVT Prophylaxis - SCDs pre op    Dr. Tariq Aguiar aware and agrees with plan as above, he has seen pt and family.         Romulo Johnson NP  Orthopedic Nurse Practitioner   [place]

## 2018-03-30 NOTE — ED PROVIDER NOTES
EMERGENCY DEPARTMENT HISTORY AND PHYSICAL EXAM      Date: 3/30/2018  Patient Name: Hu Fall    History of Presenting Illness     Chief Complaint   Patient presents with    Hip Pain     right hip pain per family       History Provided By: Patient, EMS and medical records    HPI: Hu Fall, 80 y.o. female with PMHx significant for HTN, HLD, and dementia, presents via EMS to the ED with cc of progressively worsening right hip pain s/p an injury that occurred PTA. EMS states that the patient was found down in her bathroom this morning. EMS states that the fall was unwitnessed, and there is an unknown down time. EMS is unsure if the patient had a mechanical fall vs a syncopal episode. Per medical records, the patient was recently discharged from a 2-day hospital admission on 3/26/2018. At time of discharge, the patient was discontinued her prescription for Eliquis. The patient's history is limited secondary to her history of dementia. There is no caregiver present.     PCP: Zoraida Molina NP    Current Facility-Administered Medications   Medication Dose Route Frequency Provider Last Rate Last Dose    sodium chloride (NS) flush 5-10 mL  5-10 mL IntraVENous Q8H Maxim Gonzalez NP        sodium chloride (NS) flush 5-10 mL  5-10 mL IntraVENous PRN Maxim Gonzalez NP        acetaminophen (TYLENOL) tablet 650 mg  650 mg Oral Q6H Maxim Gonzalez NP   Stopped at 03/30/18 1224    oxyCODONE IR (ROXICODONE) tablet 2.5 mg  2.5 mg Oral Q4H PRN Maxim Gonzalez NP        oxyCODONE IR (ROXICODONE) tablet 5 mg  5 mg Oral Q4H PRN Maxim Gonzalez NP        naloxone [facility]) injection 0.4 mg  0.4 mg IntraVENous PRN Maxim Gonzalez NP        senna-docusate (PERICOLACE) 8.6-50 mg per tablet 2 Tab  2 Tab Oral BID Maxim Gonzalez NP   Stopped at 03/30/18 1224    sodium chloride (NS) flush 5-10 mL  5-10 mL IntraVENous Q8H Mango Monroe MD        sodium chloride (NS) flush 5-10 mL  5-10 mL IntraVENous PRN Mango Monroe MD        0.9% sodium chloride infusion  75 mL/hr IntraVENous CONTINUOUS Kell Newell MD        acetaminophen (TYLENOL) tablet 650 mg  650 mg Oral Q4H PRN Kell Newell MD        naloxone [facility]) injection 0.4 mg  0.4 mg IntraVENous PRN Kell Newell MD        ondansetron [facility]) injection 4 mg  4 mg IntraVENous Q4H PRN Kell Newell MD        bisacodyl (DULCOLAX) suppository 10 mg  10 mg Rectal DAILY PRN Kell Newell MD        potassium chloride SR (KLOR-CON 10) tablet 40 mEq  40 mEq Oral NOW Kell Newell MD   Stopped at 03/30/18 1318    0.9% sodium chloride infusion 250 mL  250 mL IntraVENous PRN Pink Parrot, NP        potassium chloride 10 mEq in 100 ml IVPB  10 mEq IntraVENous Q1H Kell Newell MD           Past History     Past Medical History:  Past Medical History:   Diagnosis Date    Anemia     Anxiety     DDD (degenerative disc disease), cervical     Depression     Esophagitis     FH: breast cancer     FH: hypertension     Hypercholesterolemia     Hypertension     MR (mitral regurgitation)     MVP (mitral valve prolapse)     Stool color black     Thromboembolus ([address] 03/20/2018       Past Surgical History:  Past Surgical History:   Procedure Laterality Date    ABDOMEN SURGERY PROC UNLISTED  2015     and    2016    Bowel Blockage    DILATE ESOPHAGUS  6/3/2011         ENDOSCOPY, COLON, DIAGNOSTIC      due 2016    HX ABDOMINAL LAPAROSCOPY  10/2015    lap enterolysis for obstructing solitary adhesive band    HX CATARACT REMOVAL      HX GI      esoph dilatation    HX GI  08/27/2016    LAPAROTOMY EXPLORATORY, Ileocecectomy.     HX HYSTERECTOMY      HX ORTHOPAEDIC      R shoulder replacement    ID EGD TRANSORAL BIOPSY SINGLE/MULTIPLE  6/3/2011            Family History:  Family History   Problem Relation Age of Onset    Cancer Mother      breast    Hypertension Mother     Diabetes Mother     Cancer Father      head and neck    Hypertension Father     Cancer Brother     Cancer Maternal Aunt        Social History:  Social History   Substance Use Topics    Smoking status: Former Smoker    Smokeless tobacco: Never Used    Alcohol use No       Allergies: Allergies   Allergen Reactions    Bactrim [Sulfamethoxazole-Trimethoprim] Rash       Review of Systems   Review of Systems   Unable to perform ROS: Dementia       Physical Exam   Physical Exam  Nursing note and vitals reviewed. General appearance: Alert, demented. Eyes: Pupils equal, round and reactive to light, conjunctiva pale, anicteric sclera. HEENT: NC/AT. Mucous membranes are tacky, oropharynx is benign. Pulmonary: Clear to auscultation bilaterally. Cardiac: Normal rate and regular rhythm, no murmurs, gallops, or rubs. Palpable DP pulses. Abdomen: Soft, nontender, nondistended. MSK: 2-3+ pitting edema to LLE, no edema to RLE. Abrasion to right proximal humerus, unable to perform full shoulder A/PROM, moderate TTP right hip. Pt is grabbing her right LE and right hip. Severe pain w/ any RLE ROM. No C-spine step offs. Well-healed scar to right anterior shoulder; no obvious deformities. Skin: Capillary refill < 3 seconds. Neuro: Alert, not able to answer questions appropriately secondary to hx of dementia.   Written by Minoo Whitmore ED Scribsandeep, as dictated by Wilfredo Ngo MD.    Diagnostic Study Results   Labs -     Recent Results (from the past 12 hour(s))   CBC WITH AUTOMATED DIFF    Collection Time: 03/30/18 11:11 AM   Result Value Ref Range    WBC 1.6 (L) 3.6 - 11.0 K/uL    RBC 2.32 (L) 3.80 - 5.20 M/uL    HGB 7.6 (L) 11.5 - 16.0 g/dL    HCT 23.2 (L) 35.0 - 47.0 %    .0 (H) 80.0 - 99.0 FL    MCH 32.8 26.0 - 34.0 PG    MCHC 32.8 30.0 - 36.5 g/dL    RDW 18.6 (H) 11.5 - 14.5 %    PLATELET 148 478 - 445 K/uL    MPV 10.6 8.9 - 12.9 FL    NRBC 0.0 0  WBC    ABSOLUTE NRBC 0.00 0.00 - 0.01 K/uL    NEUTROPHILS 30 (L) 32 - 75 %    LYMPHOCYTES 58 (H) 12 - 49 %    MONOCYTES 9 5 - 13 %    EOSINOPHILS 1 0 - 7 %    BASOPHILS 2 (H) 0 - 1 %    IMMATURE GRANULOCYTES 0 0.0 - 0.5 %    ABS. NEUTROPHILS 0.5 (L) 1.8 - 8.0 K/UL    ABS. LYMPHOCYTES 1.0 0.8 - 3.5 K/UL    ABS. MONOCYTES 0.1 0.0 - 1.0 K/UL    ABS. EOSINOPHILS 0.0 0.0 - 0.4 K/UL    ABS. BASOPHILS 0.0 0.0 - 0.1 K/UL    ABS. IMM. GRANS. 0.0 0.00 - 0.04 K/UL    DF AUTOMATED      RBC COMMENTS OVALOCYTES  1+        RBC COMMENTS ANISOCYTOSIS  1+       METABOLIC PANEL, COMPREHENSIVE    Collection Time: 03/30/18 11:11 AM   Result Value Ref Range    Sodium 142 136 - 145 mmol/L    Potassium 3.1 (L) 3.5 - 5.1 mmol/L    Chloride 110 (H) 97 - 108 mmol/L    CO2 26 21 - 32 mmol/L    Anion gap 6 5 - 15 mmol/L    Glucose 95 65 - 100 mg/dL    BUN 14 6 - 20 MG/DL    Creatinine 0.89 0.55 - 1.02 MG/DL    BUN/Creatinine ratio 16 12 - 20      GFR est AA >60 >60 ml/min/1.73m2    GFR est non-AA >60 >60 ml/min/1.73m2    Calcium 8.4 (L) 8.5 - 10.1 MG/DL    Bilirubin, total 0.4 0.2 - 1.0 MG/DL    ALT (SGPT) 18 12 - 78 U/L    AST (SGOT) 26 15 - 37 U/L    Alk.  phosphatase 90 45 - 117 U/L    Protein, total 7.2 6.4 - 8.2 g/dL    Albumin 3.0 (L) 3.5 - 5.0 g/dL    Globulin 4.2 (H) 2.0 - 4.0 g/dL    A-G Ratio 0.7 (L) 1.1 - 2.2     CK    Collection Time: 03/30/18 11:11 AM   Result Value Ref Range    CK 55 26 - 192 U/L   TROPONIN I    Collection Time: 03/30/18 11:11 AM   Result Value Ref Range    Troponin-I, Qt. <0.04 <0.05 ng/mL   EKG, 12 LEAD, INITIAL    Collection Time: 03/30/18 11:25 AM   Result Value Ref Range    Ventricular Rate 70 BPM    Atrial Rate 70 BPM    P-R Interval 138 ms    QRS Duration 90 ms    Q-T Interval 446 ms    QTC Calculation (Bezet) 481 ms    Calculated P Axis 78 degrees    Calculated R Axis 66 degrees    Calculated T Axis 58 degrees    Diagnosis       Normal sinus rhythm  Left ventricular hypertrophy with repolarization abnormality  When compared with ECG of 26-MAR-2018 16:39,  No significant change  Confirmed by Danielle Duarte ([id number]) on 3/30/2018 11:37:34 AM     PROTHROMBIN TIME + INR    Collection Time: 03/30/18 11:47 AM   Result Value Ref Range    INR 1.2 (H) 0.9 - 1.1      Prothrombin time 11.9 (H) 9.0 - 11.1 sec   PTT    Collection Time: 03/30/18 11:47 AM   Result Value Ref Range    aPTT 27.3 22.1 - 32.0 sec    aPTT, therapeutic range     58.0 - 77.0 SECS   TYPE & SCREEN    Collection Time: 03/30/18 12:00 PM   Result Value Ref Range    Crossmatch Expiration 04/02/2018     ABO/Rh(D) Kaylie Belgian POSITIVE     Antibody screen NEG     Unit number [id number]     Blood component type RC LR     Unit division 00     Status of unit ALLOCATED     Crossmatch result Compatible    GLUCOSE, POC    Collection Time: 03/30/18  1:16 PM   Result Value Ref Range    Glucose (POC) 112 (H) 65 - 100 mg/dL    Performed by Fabiana Rojas    URINALYSIS W/ REFLEX CULTURE    Collection Time: 03/30/18  1:31 PM   Result Value Ref Range    Color YELLOW/STRAW      Appearance CLEAR CLEAR      Specific gravity 1.016 1.003 - 1.030      pH (UA) 5.5 5.0 - 8.0      Protein TRACE (A) NEG mg/dL    Glucose NEGATIVE  NEG mg/dL    Ketone NEGATIVE  NEG mg/dL    Bilirubin NEGATIVE  NEG      Blood NEGATIVE  NEG      Urobilinogen 0.2 0.2 - 1.0 EU/dL    Nitrites NEGATIVE  NEG      Leukocyte Esterase NEGATIVE  NEG      WBC 0-4 0 - 4 /hpf    RBC 0-5 0 - 5 /hpf    Epithelial cells FEW FEW /lpf    Bacteria NEGATIVE  NEG /hpf    UA:UC IF INDICATED CULTURE NOT INDICATED BY UA RESULT CNI         Radiologic Studies -     CXR Results  (Last 48 hours)               03/30/18 1042  XR CHEST SNGL V Final result    Impression:  IMPRESSION: No acute cardiopulmonary abnormality or significant change. Narrative:  INDICATION: Recently treated for sepsis 3/26/18. Preoperative evaluation for   orthopedic surgery. COMPARISON:  3/26/18       FINDINGS:    A upright AP radiograph of the chest was obtained at approximately 1023 hours. Less than optimal depth of inspiration. Wesly Beatty Heart size and pulmonary vascularity are within normal limits and stable.    Mild diffuse interstitial prominence with coarse or markings at the bases may   represent minimal atelectasis. Right shoulder prosthesis again noted. EXAM:  XR HIP RT W OR WO PELV 2-3 VWS     INDICATION:   pain, fall. Patient complains of right hip pain.     COMPARISON: CT 8/26/16.     FINDINGS:   AP view of the pelvis with attempted frog leg lateral view of the right hip were  obtained. There is a right subcapital femoral neck fracture with compression and no  definite displacement. Left hip and bony pelvis are otherwise intact.     IMPRESSION  IMPRESSION: Right femoral neck fracture.                  CT HEAD:  FINDINGS:  There is motion artifact as patient was unable to fully cooperate and hold  still. Generalized prominence of the ventricles similar to the prior study out of  proportion to sulcal size. Cerebral density is otherwise relatively normal throughout. No evidence of intracranial hemorrhage, infarct, mass or abnormal extra-axial  fluid collections. Visualized osseous structures of the calvarium and skull base including  paranasal sinuses are unremarkable.     IMPRESSION  IMPRESSION:   1. Generalized ventriculomegaly similar to prior exams appears to be primarily  due to central volume loss. 2. No acute intracranial abnormality demonstrated. XR R SHOULDER:  FINDINGS: Three views of the right shoulder demonstrate no fracture,  dislocation. A right shoulder humeral is in place without evidence for loosening  or fracture. The humeral head is superiorly displaced with respect to the  osseous glenoid. Multiple well healed right lateral rib deformities are seen.     IMPRESSION  IMPRESSION: Possible right rotator cuff tear. No evidence for hardware  Loosening. Medical Decision Making   I am the first provider for this patient. I reviewed the vital signs, available nursing notes, past medical history, past surgical history, family history and social history. Vital Signs-Reviewed the patient's vital signs.   Patient Vitals for the past 12 hrs:   Temp Pulse Resp BP SpO2   03/30/18 1500 - 68 27 169/64 100 %   03/30/18 1430 - 71 21 164/63 99 %   03/30/18 1400 - 74 22 170/67 99 %   03/30/18 1310 - 86 20 (!) 181/103 (!) 79 %   03/30/18 1054 - - - (!) 152/93 -   03/30/18 1011 98.1 °F (36.7 °C) - - 106/60 -   03/30/18 1004 - (!) 57 22 - 99 %       EKG interpretation: (Preliminary) 11:25  Rhythm: Normal sinus rhythm; and regular . Rate (approx.): 70; Axis: Normal; ST/T wave: No JEREMY/STD; WY interval 138 ms, QRS interval 90 ms,  ms, QTc 481 ms. Written by Barry Disla ED Scribe, as dictated by Katy Rosales MD.    Pulse Oximetry Analysis - 98% on RA    Records Reviewed: Nursing Notes, Old Medical Records, Previous electrocardiograms, Previous Radiology Studies and Previous Laboratory Studies    Provider Notes (Medical Decision Making):   DDx: Hip fracture, shoulder contusion vs fracture, CHI, ICH, UTI, rhabdomyolysis, possible syncope. ED Course:   Initial assessment performed. The patients presenting problems have been discussed, and they are in agreement with the care plan formulated and outlined with them. I have encouraged them to ask questions as they arise throughout their visit. Progress Note:  11:38 PM  Reviewed the patient's xray, and it is significant for a right femoral neck fracture. Written by Barry Disla ED Scribe, as dictated by Katy Rosales MD.    Consult Note:  11:41 AM  Katy Rosales MD spoke with Ele Bender NP who will discuss the case with Ambar Mushtaq. Zenovia Phoenix, MD (attending physician),  Specialty: Orthopedic Surgery  Discussed pt's hx, disposition, and available diagnostic and imaging results. Reviewed care plans. Consultant agrees with plans as outlined. Ele Bender NP is aware of the patient, and she will consult at bedside. Progress Note:  11:47 AM  Yang Jean Baptiste MD is at bedside.   Written by Barry Disla ED Scribe, as dictated by Katy Rosales MD.    CONSULT NOTE:   12:09 PM  Betty Tripp Ernestina Peterson MD spoke with Hector Aaron MD,   Specialty: Hospitalist  Discussed pt's hx, disposition, and available diagnostic and imaging results. Reviewed care plans. Consultant will evaluate pt for admission. Written by Niesha Martinez, ED Scribe, as dictated by Yamel Avery MD.    Critical Care Time: 0 minutes    Admit Note:  12:09 PM  Pt is being admitted by Hector Aaron MD. The results of their tests and reason(s) for their admission have been discussed with pt and/or available family. They convey agreement and understanding for the need to be admitted and for admission diagnosis. Diagnosis     Clinical Impression:   1. Closed fracture of neck of right femur, initial encounter ([facility] [address]    2. Chronic anemia    3. Leukopenia, unspecified type    4. Hypokalemia        Attestations: This note is prepared by Niesha Martinez, acting as a Scribe for Yamel Avery MD.    Yamel Avery MD: The scribe's documentation has been prepared under my direction and personally reviewed by me in its entirety. I confirm that the notes above accurately reflects all work, treatment, procedures, and medical decision making performed by me. This note will not be viewable in [address]

## 2018-03-30 NOTE — ED NOTES
Pt. To room from x-ray. Patient in stretcher in position of comfort. No family at bedside. Pt. Unable to answer questions due to dementia.

## 2018-03-30 NOTE — H&P
Hospitalist Admission Note    NAME: Patricia Vo   :  1934   MRN:  [id number]     Date/Time:  3/30/2018 1:09 PM    Patient PCP: Julio Choi NP  ______________________________________________________________________  Given the patient's current clinical presentation, I have a high level of concern for decompensation if discharged from the emergency department. Complex decision making was performed, which includes reviewing the patient's available past medical records, laboratory results, and x-ray films. My assessment of this patient's clinical condition and my plan of care is as follows. Assessment / Plan:  R hip fracture from GLF  Possible rotator cuff tear  -XR showed R femoral neck fracture  -DVT and pain control per ortho team.  Given her dementia and severe sundowning hx. Would limit narcotic use  Pre-op cardiac risk assessment:  Pt evaluated using revised cardiac risk index and is felt to be moderatecardiovascular risk for ntermediate risk surgery with a 1-2.4 risk for major complications based on these criteria. This risk has been discussed with the patient and her daughter and pt wishes to proceed. Plan for surgery without further cardiac testing if this risk is acceptable per surgery and anesthesia. Further risk reduction will involve medical management of other comorbid conditions in the perioperative period. -ortho team following    Hypokalemia  -replete with KCL  -monitor    Recent bacteremia due to UTI  -will repeat another set of Bcx  -cont' home keflex to compelte 10 days    Fall, mechanical vs syncope?  -unable to perform orthostatic vitals. Head CT neg   -given pt's hospice status and daughter is in agreement with no aggressive measure. Will hold further work up to include MRI, EEG. I have discussed with pt's daughter who agrees. Acute on chronic anemia with leukopenia  Acute L leg DVT  -monitor H/H closely.   She may need more transfusion post op  -family opted for hospice on last discharge. Also against systemic OAc or IVC filter    Severe dementia with hx of sundowning  Anxiety and agitation, per daughter, difficult to control. 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  female with PMHx significant for HTN, HLD, and dementia present to ED via EMS for further evaluation of R hip pain s/p GLF from home. Patient was recently discharged on 3/28/17 with to complete abx treatment for UTI and bacteremia for which she completed 2 days of meds since discharge. Pt was also discharge to home with hospice at the time. Per daughter, pt was in her room today when daughter heard patient yelling for help. Daughter found pt on the floor in her bathroom. Daughter is unclear how the fall episode occurred. In the ER, pt c/o of R hip pain. Xr hip showed R femoral neck fracture. In the ER, vitals; T 98.1, P57, /60, Spo2 99% on RA  Labs: WBC 1.6, hgb 7.6, , K 3.1, cr 0.89  XR hip showed R femoral neck fracture. CT head neg for acute abnormalities   XR R shoulder showed possible right rotator cuff tear. No evidence for hardware  loosening. We were asked to admit for work up and evaluation of the above problems.      Past Medical History:   Diagnosis Date    Anemia     Anxiety     DDD (degenerative disc disease), cervical     Depression     Esophagitis     FH: breast cancer     FH: hypertension     Hypercholesterolemia     Hypertension     MR (mitral regurgitation)     MVP (mitral valve prolapse)     Stool color black     Thromboembolus ([facility] [address] 03/20/2018        Past Surgical History:   Procedure Laterality Date    ABDOMEN SURGERY PROC UNLISTED  2015     and    2016    Bowel Blockage    DILATE ESOPHAGUS  6/3/2011         ENDOSCOPY, COLON, DIAGNOSTIC      due 2016    HX ABDOMINAL LAPAROSCOPY  10/2015    lap enterolysis for obstructing solitary adhesive band    HX CATARACT REMOVAL      HX GI      esoph dilatation    HX GI  08/27/2016    LAPAROTOMY EXPLORATORY, Ileocecectomy.  HX HYSTERECTOMY      HX ORTHOPAEDIC      R shoulder replacement    DE EGD TRANSORAL BIOPSY SINGLE/MULTIPLE  6/3/2011            Social History   Substance Use Topics    Smoking status: Former Smoker    Smokeless tobacco: Never Used    Alcohol use No        Family History   Problem Relation Age of Onset    Cancer Mother      breast    Hypertension Mother     Diabetes Mother     Cancer Father      head and neck    Hypertension Father     Cancer Brother     Cancer Maternal Aunt      Allergies   Allergen Reactions    Bactrim [Sulfamethoxazole-Trimethoprim] Rash        Prior to Admission medications    Medication Sig Start Date End Date Taking? Authorizing Provider   cephALEXin (KEFLEX) 500 mg capsule Take 1 Cap by mouth four (4) times daily for 10 days. 3/28/18 4/7/18  Teresa Craven MD   metoprolol succinate (TOPROL-XL) 50 mg XL tablet Take 1 Tab by mouth daily. 2/27/18   Rankin Denver, NP   sertraline (ZOLOFT) 50 mg tablet Take 75 mg by mouth daily. Historical Provider   ALPRAZolam (XANAX) 0.25 mg tablet TAKE ONE TABLET BY MOUTH TWICE DAILY AS NEEDED FOR ANXIETY. MAX OF 2 TABLETS DAILY  Indications: anxiety 11/1/17   Rankin Denver, NP       REVIEW OF SYSTEMS:     I am not able to complete the review of systems because:    The patient is intubated and sedated    The patient has altered mental status due to his acute medical problems    The patient has baseline aphasia from prior stroke(s)   xxxx The patient has baseline dementia and is not reliable historian    The patient is in acute medical distress and unable to provide information           Total of 12 systems reviewed as follows:       POSITIVE= BOLD text  Negative = text not BOLD  General:  fever, chills, sweats, generalized weakness, weight loss/gain,      loss of appetite   Eyes:    blurred vision, eye pain, loss of vision, double vision  ENT:    rhinorrhea, pharyngitis   Respiratory:   cough, sputum production, SOB, HENDERSON, wheezing, pleuritic pain   Cardiology:   chest pain, palpitations, orthopnea, PND, edema, syncope   Gastrointestinal:  abdominal pain , N/V, diarrhea, dysphagia, constipation, bleeding   Genitourinary:  frequency, urgency, dysuria, hematuria, incontinence   Muskuloskeletal :  arthralgia, myalgia, back pain  Hematology:  easy bruising, nose or gum bleeding, lymphadenopathy   Dermatological: rash, ulceration, pruritis, color change / jaundice  Endocrine:   hot flashes or polydipsia   Neurological:  headache, dizziness, confusion, focal weakness, paresthesia,     Speech difficulties, memory loss, gait difficulty  Psychological: Feelings of anxiety, depression, agitation    Objective:   VITALS:    Visit Vitals    BP (!) 152/93    Pulse (!) 57    Temp 98.1 °F (36.7 °C)    Resp 22    SpO2 99%       PHYSICAL EXAM:    General:    Female in bed, confused  HEENT: Atraumatic, anicteric sclerae, pink conjunctivae     No oral ulcers, mucosa moist, throat clear  Neck:  Supple, symmetrical,  thyroid: non tender  Lungs:   CTA b/l. No wheezing or Rhonchi. No rales. Chest wall:  No tenderness. No accessory muscle use. Heart:   Regular  rhythm,  No  Murmur. + 2 pitting edema to LLE  Abdomen:   Soft, NT. ND  BS+  Extremities: No cyanosis. No clubbing,      Skin turgor normal, Radial dial pulse 2+  Skin:     Not pale. Not Jaundiced  No rashes   Psych:  Not depressed. Not anxious or agitated.   Neurologic: AAOx0, in distress due to pain, moving upper exts    _______________________________________________________________________  Care Plan discussed with:    Comments   Patient x    Family  x daughter   RN x    Care Manager Consultant:  kacey COREA physician   _______________________________________________________________________  Expected  Disposition:   Home with Family    HH/PT/OT/RN x   SNF/LTC    GYPSY    ________________________________________________________________________  TOTAL TIME:  72 Minutes    Critical Care Provided     Minutes non procedure based      Comments    x Reviewed previous records   >50% of visit spent in counseling and coordination of care x Discussion with patient and/or family and questions answered       ________________________________________________________________________  Signed: Angelica Lockhart MD    Procedures: see electronic medical records for all procedures/Xrays and details which were not copied into this note but were reviewed prior to creation of Plan. LAB DATA REVIEWED:    Recent Results (from the past 24 hour(s))   CBC WITH AUTOMATED DIFF    Collection Time: 03/30/18 11:11 AM   Result Value Ref Range    WBC 1.6 (L) 3.6 - 11.0 K/uL    RBC 2.32 (L) 3.80 - 5.20 M/uL    HGB 7.6 (L) 11.5 - 16.0 g/dL    HCT 23.2 (L) 35.0 - 47.0 %    .0 (H) 80.0 - 99.0 FL    MCH 32.8 26.0 - 34.0 PG    MCHC 32.8 30.0 - 36.5 g/dL    RDW 18.6 (H) 11.5 - 14.5 %    PLATELET 423 405 - 295 K/uL    MPV 10.6 8.9 - 12.9 FL    NRBC 0.0 0  WBC    ABSOLUTE NRBC 0.00 0.00 - 0.01 K/uL    NEUTROPHILS 30 (L) 32 - 75 %    LYMPHOCYTES 58 (H) 12 - 49 %    MONOCYTES 9 5 - 13 %    EOSINOPHILS 1 0 - 7 %    BASOPHILS 2 (H) 0 - 1 %    IMMATURE GRANULOCYTES 0 0.0 - 0.5 %    ABS. NEUTROPHILS 0.5 (L) 1.8 - 8.0 K/UL    ABS. LYMPHOCYTES 1.0 0.8 - 3.5 K/UL    ABS. MONOCYTES 0.1 0.0 - 1.0 K/UL    ABS. EOSINOPHILS 0.0 0.0 - 0.4 K/UL    ABS. BASOPHILS 0.0 0.0 - 0.1 K/UL    ABS. IMM.  GRANS. 0.0 0.00 - 0.04 K/UL    DF AUTOMATED      RBC COMMENTS OVALOCYTES  1+        RBC COMMENTS ANISOCYTOSIS  1+       METABOLIC PANEL, COMPREHENSIVE    Collection Time: 03/30/18 11:11 AM   Result Value Ref Range    Sodium 142 136 - 145 mmol/L    Potassium 3.1 (L) 3.5 - 5.1 mmol/L    Chloride 110 (H) 97 - 108 mmol/L    CO2 26 21 - 32 mmol/L    Anion gap 6 5 - 15 mmol/L    Glucose 95 65 - 100 mg/dL    BUN 14 6 - 20 MG/DL    Creatinine 0.89 0.55 - 1.02 MG/DL    BUN/Creatinine ratio 16 12 - 20      GFR est AA >60 >60 ml/min/1.73m2    GFR est non-AA >60 >60 ml/min/1.73m2    Calcium 8.4 (L) 8.5 - 10.1 MG/DL    Bilirubin, total 0.4 0.2 - 1.0 MG/DL    ALT (SGPT) 18 12 - 78 U/L    AST (SGOT) 26 15 - 37 U/L    Alk.  phosphatase 90 45 - 117 U/L    Protein, total 7.2 6.4 - 8.2 g/dL    Albumin 3.0 (L) 3.5 - 5.0 g/dL    Globulin 4.2 (H) 2.0 - 4.0 g/dL    A-G Ratio 0.7 (L) 1.1 - 2.2     EKG, 12 LEAD, INITIAL    Collection Time: 03/30/18 11:25 AM   Result Value Ref Range    Ventricular Rate 70 BPM    Atrial Rate 70 BPM    P-R Interval 138 ms    QRS Duration 90 ms    Q-T Interval 446 ms    QTC Calculation (Bezet) 481 ms    Calculated P Axis 78 degrees    Calculated R Axis 66 degrees    Calculated T Axis 58 degrees    Diagnosis       Normal sinus rhythm  Left ventricular hypertrophy with repolarization abnormality  When compared with ECG of 26-MAR-2018 16:39,  No significant change  Confirmed by Christopher Ulloa ([id number]) on 3/30/2018 11:37:34 AM     PROTHROMBIN TIME + INR    Collection Time: 03/30/18 11:47 AM   Result Value Ref Range    INR 1.2 (H) 0.9 - 1.1      Prothrombin time 11.9 (H) 9.0 - 11.1 sec   PTT    Collection Time: 03/30/18 11:47 AM   Result Value Ref Range    aPTT 27.3 22.1 - 32.0 sec    aPTT, therapeutic range     58.0 - 77.0 SECS

## 2018-03-30 NOTE — PROGRESS NOTES
Pharmacy Clarification of the Prior to Admission Medication Regimen Retrospective to the Admission Medication Reconciliation    The patient was not interviewed regarding clarification of the prior to admission medication regimen due to AMS. Patient's daughter was present in room and able to provide PTA medication history for the patient. Information Obtained From: Patient's daughter, RX Query    Recommendations/Findings: The following amendments were made to the patient's active medication list on file at [facility]:     1) Additions:    acetaminophen (TYLENOL) 500 mg tablet   diphenhydrAMINE-acetaminophen (TYLENOL PM EXTRA STRENGTH)  mg tab    2) Removals: NONE    3) Changes:   ALPRAZolam (XANAX) 0.25 mg tablet (Old regimen: 0.25 mg BID PRN /New regimen: 0.25 mg BID)    4) Pertinent Pharmacy Findings:   Patient resides with her daughter, Loreto Callahan, who is her primary care taker.  cephALEXin (KEFLEX) 500 mg capsule: Patient started a 10 day regimen on 3/28/18. Patient has completed 2 days  and 1 dose of therapy as of 3/30/18. Antibiotic Indication: UTI       PTA medication list was corrected to the following:     Prior to Admission Medications   Prescriptions Last Dose Informant Patient Reported? Taking? ALPRAZolam (XANAX) 0.25 mg tablet 3/30/2018 at 0430 Child Yes Yes   Sig: Take 0.25 mg by mouth two (2) times a day. acetaminophen (TYLENOL) 500 mg tablet 3/30/2018 at 0500 Child Yes Yes   Sig: Take 1,000 mg by mouth every six (6) hours as needed for Pain. cephALEXin (KEFLEX) 500 mg capsule 3/30/2018 at 0430 Child No Yes   Sig: Take 1 Cap by mouth four (4) times daily for 10 days. diphenhydrAMINE-acetaminophen (TYLENOL PM EXTRA STRENGTH)  mg tab 3/29/2018 at Unknown time Child Yes Yes   Sig: Take 1 Tab by mouth nightly. metoprolol succinate (TOPROL-XL) 50 mg XL tablet 3/29/2018 at Unknown time Child Yes Yes   Sig: Take 50 mg by mouth nightly.    sertraline (ZOLOFT) 50 mg tablet 3/29/2018 at Unknown time Child Yes Yes   Sig: Take 75 mg by mouth daily.       Facility-Administered Medications: None          Thank you,  Terry Nageotte, CPhT  Medication History Pharmacy Technician

## 2018-03-30 NOTE — IP AVS SNAPSHOT
3715 [facility] [address] [facility] 
[phone] Patient: Warren Pina MRN: [id number] HBB:[id number] About your hospitalization You were admitted on:  March 30, 2018 You last received care in the:  [facility] 3 ORTHOPEDICS You were discharged on:  April 3, 2018 Why you were hospitalized Your primary diagnosis was:  Not on File Your diagnoses also included:  Hip Fracture (Hcc) Follow-up Information Follow up With Details Comments Contact Info MD Sofia Patel  [address]. [facility] 
[phone] Janna Friend MD Go on 4/9/2018 For scheduled new patient appointment at 3:00PM  [address] [facility] [address] [facility] 
[phone] Neeraj Khan NP   [address] [place] [address] 
[phone] Your Scheduled Appointments Tuesday April 03, 2018 To Be Determined SN HOSPICE ASSESSMENT with STEF Mace 39 ([address]) Jada 39 ([address]) Monday April 09, 2018  3:00 PM EDT New Patient with Janna Friend MD  
035[address] [facility] at [facility]) [address] [facility] [address] [facility]  
[phone] Discharge Orders None A check cuba indicates which time of day the medication should be taken. My Medications CHANGE how you take these medications Instructions Each Dose to Equal  
 Morning Noon Evening Bedtime ALPRAZolam 0.25 mg tablet Commonly known as:  Teresa Queen What changed:  Another medication with the same name was removed. Continue taking this medication, and follow the directions you see here. Your last dose was: Your next dose is: Take 0.25 mg by mouth two (2) times a day.   
 0.25 mg  
    
   
   
   
  
 metoprolol succinate 50 mg XL tablet Commonly known as:  TOPROL-XL What changed:  Another medication with the same name was removed. Continue taking this medication, and follow the directions you see here. Your last dose was: Your next dose is: Take 50 mg by mouth nightly. 50 mg CONTINUE taking these medications Instructions Each Dose to Equal  
 Morning Noon Evening Bedtime  
 acetaminophen 500 mg tablet Commonly known as:  TYLENOL Your last dose was: Your next dose is: Take 1,000 mg by mouth every six (6) hours as needed for Pain. 1000 mg  
    
   
   
   
  
 cephALEXin 500 mg capsule Commonly known as:  Lorraine Skillern Your last dose was: Your next dose is: Take 1 Cap by mouth four (4) times daily for 10 days. 500 mg  
    
   
   
   
  
 TYLENOL PM EXTRA STRENGTH  mg Tab Generic drug:  diphenhydrAMINE-acetaminophen Your last dose was: Your next dose is: Take 1 Tab by mouth nightly. 1 Tab ZOLOFT 50 mg tablet Generic drug:  sertraline Your last dose was: Your next dose is: Take 75 mg by mouth daily. 75 mg Discharge Instructions HOSPITALIST DISCHARGE INSTRUCTIONS 
 
NAME: Rainer Womack :  1934 MRN:  [id number] Date/Time:  2018 8:28 AM 
 
ADMIT DATE: 3/30/2018 DISCHARGE DATE: 2018 DISCHARGE DIAGNOSIS: 
R hip fracture POA- s/p ORIF this admission 
from [facility] home in bathroom Possible rotator cuff tear POA - conservative management only Hypokalemia POA- replenished Recent Gram neg bacteremia POA- cleared on Blood Cx this admission 
due to UTI POA- cont kelfex as planned on last discharged as able- complete total 10 days (initial prescription) Fall, mechanical vs syncope? - Pt going home on Hospice, fall precautions Acute on chronic anemia with leukopenia Recent Acute L leg DVT POA - pt/family deferred full anticoagulation as on last discharge- pt going home on Hospice Severe dementia with hx of sundowning- Po ativan as before Anxiety and agitation, per daughter, difficult to control.   
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 
 
Active Problems: 
  Hip fracture ([facility] [address] (3/30/2018) MEDICATIONS: 
As per medication reconciliation  list 
· It is important that you take the medication exactly as they are prescribed. · Keep your medication in the bottles provided by the pharmacist and keep a list of the medication names, dosages, and times to be taken in your wallet. · Do not take other medications without consulting your doctor. Pain Management: per above medications What to do at [facility] Recommended diet:  Comfort feeding Recommended activity: bed rest, fall precautions at home If you have questions regarding the hospital related prescriptions or hospital related issues please call [employer] at . Follow Up: 
Dr. Antoine Hemphill NP  As needed [employer] [facility] at home as set up. Information obtained by : 
I understand that if any problems occur once I am at home I am to contact my physician. I understand and acknowledge receipt of the instructions indicated above. Physician's or R.N.'s Signature                                                                  Date/Time Patient or Representative Signature                                                          Date/Time Nataly Flor Surgery: Hip Fracture Repair Surgeon:    Chelsea Middleton MD 
 Surgery Date:  3/31/2018 ? To relieve pain: ? Use ice/gel packs. ? Put the ice pack directly over the wound, or anywhere you are hurting or swollen. ? To control pain and swelling, keep ice on regularly, especially after physical activity. ? The packs should stay cold for 3-4 hours. When it is not cold anymore, rotate with the packs in the freezer. ? Elevate your leg. This will also keep swelling down. ? Rest for at least 20 minutes between activity or exercises. ? To keep track of your pain medications, write down what you take and when you take it. ? The last dose of pain medication you got in the hospital was:    
Medication Dose Date & Time ? Choose your medications based on the pain scale below: ? To keep your pain under control, take Tylenol every 6 hours for 14 days - even if you feel like you dont need it. ? For mild to moderate pain (1-6 on pain scale), take one pain pill every 3-4 hours as needed. ? For severe pain (7-10 on pain scale), take two pain pills every 3-4 hours as needed. ? To prevent nausea, take your pain medications with food. Pain Scale ? As your pain lessens: 
 
? Slowly start taking less pain medication. You may do this by waiting longer between doses or by taking smaller doses. ? Stop using the pain medications as soon as you no longer need it, usually in 2-3 weeks. ? OPSITE (Honeycomb dressing) ? Keep your clear, waterproof dressing in place for 5-7 days after your leave the hospital. 
  
? If you are still having drainage, you will need to change your dressing in 5-7 days. You will be given one extra dressing to use at home. ? If there is no more drainage from the wound, you may leave it open to the air. OPSITE DRESSING INSTRUCTIONS ? DO NOTs: 
? Do not rub your surgical wound ? Do not put lotion or oils on your wound. ? Do not take a tub bath or go swimming until your doctor says it is ok. 
 
? You may shower with this dressing over your wound. ? After showering pat the dressing dry. ? If you have staples a home health nurse will remove them in about 10 days. ? To increase and promote healing: 
 
? Stop Smoking (or at least cut back on 
     Smoking). ? Eat a well-balanced diet (high in protein 
     and vitamin C). ? If you have a poor appetite, drink Ensure, Glucerna, or CarnationInstant Breakfast for the next 30 days. ? If you are diabetic, you should control your blood  
      sugars to prevent infection and help your wound  
      to heal. 
 
 
? To prevent constipation, stay active & drink plenty of fluid. ? While using pain medications, you should also take stool softeners and laxatives, such as Pericolace and Miralax. ? If you are having too many bowel movements, then you may need  to stop taking the laxatives. ? You should have a bowel movement 3-4 days after surgery and then at least every other day while on pain medication. ? To improve your recovery, you must be active! 
 
? WEIGHT BEARING ? As Tolerated = You can put as much weight on your leg as you can tolerate while walking. ? THERAPY ? If you go to a rehab facility, physical therapy (PT) will need to work with you daily, and sometimes twice a day to teach you how to: 
 
? Get in and out of the bed ? Walk (gait training) and climb stairs ? Do exercises and gain strength ? Use a walker, crutches or cane ? You may also need to have occupational therapy (OT) work with you to help you practice: 
 
? Getting on and off the toilet ? Self-care (brushing teeth, eating, bathing, etc) ?  If you go directly home, home health physical therapy will come the day after you leave the hospital.  They will visit about 4 times over the next couple of weeks to teach you how to get out of bed, to safely walk in your home, and to do your exercises. ? NO DRIVING until your surgeon tells you it is ok. 
 
? You can return to work when cleared by a physician. ? Please call your physician immediately if you have: 
 
? Constant bleeding from your wound. ? Increasing redness or swelling around your wound (Some warmth, bruising and swelling is normal). ? Change in wound drainage (increase in amount, color, or bad smell). ? Change in mental status (unusual behavior) ? Temperature over 101.5 degrees Fahrenheit ? Increased pain, swelling, or redness in the calf (back of your lower leg), thigh, ankle or foot. ? Emergency: CALL 911 if you have: 
? Shortness of breath ? Chest pain when you cough or taking a deep breath ? Please call your surgeons office at [facility] for a follow up appointment. ? You should call as soon as you get home or the next day after discharge. Ask to make an appointment in 2 weeks. ACO Transitions of Care [facility] offers a voluntary care coordination program to provide high quality service and care to [facility] fee-for-service beneficiaries. Cristofer Jorge was designed to help you enhance your health and well-being through the following services: ? Transitions of Care  support for individuals who are transitioning from one care setting to another (example: Hospital to home). ? Chronic and Complex Care Coordination  support for individuals and caregivers of those with serious or chronic illnesses or with more than one chronic (ongoing) condition and those who take a number of different medications.   
 
 
If you meet specific medical criteria, a [address] [place] [address] Rd may call you directly to coordinate your care with your primary care physician and your other care providers. For questions about the [facility] programs, please, contact your physicians office. For general questions or additional information about Accountable Care Organizations: 
Please visit www.medicare.gov/acos. html or call 1-800-MEDICARE (8-754.791.5023) TTY users should call 6-381.768.7814. MILI Announcement We are excited to announce that we are making your provider's discharge notes available to you in MILI. You will see these notes when they are completed and signed by the physician that discharged you from your recent hospital stay. If you have any questions or concerns about any information you see in MILI, please call the Health Information Department where you were seen or reach out to your Primary Care Provider for more information about your plan of care. Introducing [facility]! [facility] introduces MILI patient portal. Now you can access parts of your medical record, email your doctor's office, and request medication refills online. 1. In your internet browser, go to [email or web address] [email or web address] Network 2. Click on the First Time User? Click Here link in the Sign In box. You will see the New Member Sign Up page. 3. Enter your MILI Access Code exactly as it appears below. You will not need to use this code after youve completed the sign-up process. If you do not sign up before the expiration date, you must request a new code. · MILI Access Code: [email or web address] Expires: 6/18/2018  3:07 PM 
 
4. Enter the last four digits of your Social Security Number (xxxx) and Date of Birth (mm/dd/yyyy) as indicated and click Submit. You will be taken to the next sign-up page. 5. Create a [employer] Acc[employer] ID. This will be your MILI login ID and cannot be changed, so think of one that is secure and easy to remember. 6. Create a [employer] password. You can change your password at any time. 7. Enter your Password Reset Question and Answer. This can be used at a later time if you forget your password. 8. Enter your e-mail address. You will receive e-mail notification when new information is available in [address] 9. Click Sign Up. You can now view and download portions of your medical record. 10. Click the Download Summary menu link to download a portable copy of your medical information. If you have questions, please visit the Frequently Asked Questions section of the S[employer] website. Remember, [employer]ight is NOT to be used for urgent needs. For medical emergencies, dial 911. Now available from your iPhone and Android! Introducing Ilya Rothman As a Rom[facility] Lessen patient, I wanted to make you aware of our electronic visit tool called Ilya Rothman. [facility] 24/7 allows you to connect within minutes with a medical provider 24 hours a day, seven days a week via a mobile device or tablet or logging into a secure website from your computer. You can access Ilya Rothman from anywhere in the [place]. A virtual visit might be right for you when you have a simple condition and feel like you just dont want to get out of bed, or cant get away from work for an appointment, when your regular Rom[facility] Lessen provider is not available (evenings, weekends or holidays), or when youre out of town and need minor care. Electronic visits cost only $49 and if the [place] Lessen 24/7 provider determines a prescription is needed to treat your condition, one can be electronically transmitted to a nearby pharmacy*. Please take a moment to enroll today if you have not already done so. The enrollment process is free and takes just a few minutes. To enroll, please download the Román Lessen 24/7 jackeline to your tablet or phone, or visit [email or web address]."[email or web address] org to enroll on your computer.    
And, as an [address] [place] [address] patient with a [employer] account, the results of your visits will be scanned into your electronic medical record and your primary care provider will be able to view the scanned results. We urge you to continue to see your regular [facility] provider for your ongoing medical care. And while your primary care provider may not be the one available when you seek a Ilya Burtonfin virtual visit, the peace of mind you get from getting a real diagnosis real time can be priceless. For more information on [employer], view our Frequently Asked Questions (FAQs) at [email or web address]. Sincerely, 
 
Ezekiel Moe MD 
Chief Medical Officer 50En[address] Moura *:  certain medications cannot be prescribed via Pager Unresulted Labs-Please follow up with your PCP about these lab tests Order Current Status CULTURE, BLOOD, PAIRED Preliminary result Providers Seen During Your Hospitalization Provider Specialty Primary office phone Meryle Dec. Clabe Cluck, MD Emergency Medicine [phone] Yomaira Humphreys MD Internal Medicine [phone] Alyssia Uribe MD Hospitalist [phone] Your Primary Care Physician (PCP) Primary Care Physician Office Phone Office Fax Clarence AMARAL [phone] You are allergic to the following Allergen Reactions Bactrim (Sulfamethoxazole-Trimethoprim) Rash Recent Documentation Height Weight BMI OB Status Smoking Status 1.626 m 58.1 kg 21.31 kg/m2 Hysterectomy Never Smoker Emergency Contacts Name Discharge Info Relation Home Work Mobile [facility] DISCHARGE CAREGIVER [3] Child [2] [phone] Hilary Jean  Son [22] [phone] Patient Belongings The following personal items are in your possession at time of discharge: 
  Dental Appliances: None  Visual Aid:  At bedside, [address] Medications: None   Jewelry: None  Clothing: None    Other Valuables: None Please provide this summary of care documentation to your next provider. Signatures-by signing, you are acknowledging that this After Visit Summary has been reviewed with you and you have received a copy. Patient Signature:  ____________________________________________________________ Date:  ____________________________________________________________  
  
Maxim Adrian Provider Signature:  ____________________________________________________________ Date:  ____________________________________________________________

## 2018-03-30 NOTE — HOSPICE
[employer] RN note. Verdie Cal was to be admitted to home hospice today with fall during the night resulting in a fracture. We will follow patient with courtesy visits daily.     Please call [phone] if questions or concerns    Dorothy Washington RN [facility]

## 2018-03-30 NOTE — PERIOP NOTES
TRANSFER - IN REPORT:    Verbal report received from [facility] on Hu Fall  being received from [phone] for ordered procedure      Report consisted of patients Situation, Background, Assessment and   Recommendations(SBAR). Information from the following report(s) SBAR, Kardex, Intake/Output, MAR and Recent Results was reviewed with the receiving nurse. Opportunity for questions and clarification was provided. Assessment completed upon patients arrival to unit and care assumed. 1604:  Dr. Chino Ervin in to see the patient. Surgery is cancelled at this point.   Daughter is aware & is at her side

## 2018-03-30 NOTE — PROGRESS NOTES
Patient known to C/ as she had been scheduled for admission to [address] [place] [address] on 3-29-18 (after previous admission to [id number] [place] [address] and discharge home on 3-28-18) but then it was set for reschedule today. However, noted patient is now currently at [address] [facility] ED. Per records, she is s/p GLF this am, imaging shows RT femoral neck fracture. Plan now is for hemiarthroplasty possibly today, pending clearance. I spoke with Marilu Do/[facility]/ED CM today and reviewed case with her and asked that case management follow and keep me updated. Will continue to follow.

## 2018-03-30 NOTE — ED NOTES
Orthopedics came to bedside to see patient.  Patient placed on 2L NC after receiving pain medication per MD request.

## 2018-03-30 NOTE — PROGRESS NOTES
Medication History completed.     If appropriate, please consider resuming Metoprolol XL 50mg daily  And  Sertraline 75mg daily

## 2018-03-30 NOTE — ROUTINE PROCESS
TRANSFER - OUT REPORT:    Verbal report given to Ayan RN(name) on Tristan Brandt  being transferred to Orthopedics(unit) for routine progression of care       Report consisted of patients Situation, Background, Assessment and   Recommendations(SBAR). Information from the following report(s) SBAR was reviewed with the receiving nurse. Lines:   Peripheral IV 03/30/18 Left Wrist (Active)   Site Assessment Clean, dry, & intact 3/30/2018 12:09 PM   Phlebitis Assessment 0 3/30/2018 12:09 PM   Infiltration Assessment 0 3/30/2018 12:09 PM   Dressing Status Clean, dry, & intact 3/30/2018 12:09 PM   Dressing Type Transparent 3/30/2018 12:09 PM   Hub Color/Line Status Blue 3/30/2018 12:09 PM        Opportunity for questions and clarification was provided.       Patient transported with:   [employer]

## 2018-03-30 NOTE — ED NOTES
Attempted to medicate patient with PO medications per MD orders. Attempted to initially screen patient using applesauce. Patient had significantly delayed swallowing. Unable to give patient PO medications at this time. Speech consult placed. 1505-Hospitalist Ayanna Zimmer notified that patient unable to take pills at this time. Per MD, speech only necessary if daughter requests. Patient daughter declined.

## 2018-03-30 NOTE — ED NOTES
Verbal bedside report received from [address] [place] [address]. (off-going nurse). MD at bedside with patient. No family at the bedside at this time. Will continue to monitor and assess patient needs.

## 2018-03-30 NOTE — ROUTINE PROCESS
TRANSFER - IN REPORT:    Verbal report received from Iliana RN(name) on Nataly Flor  being received from ED(unit) for routine progression of care      Report consisted of patients Situation, Background, Assessment and   Recommendations(SBAR). Information from the following report(s) SBAR, Kardex, ED Summary, Intake/Output, MAR and Recent Results was reviewed with the receiving nurse. Opportunity for questions and clarification was provided. Assessment completed upon patients arrival to unit and care assumed.

## 2018-03-30 NOTE — ROUTINE PROCESS
Bedside and Verbal shift change report given to Frieda Deluna (oncoming nurse) by Leonid James (offgoing nurse). Report included the following information SBAR, Kardex, Intake/Output, MAR and Recent Results.

## 2018-03-31 NOTE — BRIEF OP NOTE
BRIEF OPERATIVE NOTE    Date of Procedure: 3/31/2018   Preoperative Diagnosis: right hip fracture  Postoperative Diagnosis: right hip fracture    Procedure(s):  right HIP HEMIARTHROPLASTY  Surgeon(s) and Role: Christina Kwok MD - Primary         Assistant Staff:  Sandra Vickers PA-C  - Assist       Surgical Staff:  Circ-1: Hong Gomes RN  Circ-Relief: Andra Mcgarry RN  Scrub Tech-1: Joshua Jeffery  Float Staff: Rosanne Noel RN; Bernadine Gilford; Sandor Sánchez  Event Time In   Incision Start 1807   Incision Close 1840     Anesthesia: Other   Estimated Blood Loss: 40cc  Specimens: * No specimens in log *   Findings: see above   Complications: none  Implants:   Implant Name Type Inv.  Item Serial No.  Lot No. LRB No. Used Action   HEAD FEM TYP 1 TAPR MOD 28MM --  - SN/A  HEAD FEM TYP 1 TAPR MOD 28MM --  N/A [employer] [id number] Right 1 Implanted   CUP ACET BPLR RINGLOK [id number] --  - SN/A  CUP ACET BPLR RINGLOK [id number] --  N/A [employer] [id number] Right 1 Implanted   STEM FEM ECHO BI-MTRC [id number] --  - SN/A   STEM FEM ECHO BI-MTRC [id number] --  N/A [employer] [id number] Right 1 Implanted

## 2018-03-31 NOTE — ROUTINE PROCESS
Bedside and Verbal shift change report given to Nate Perdomo (oncoming nurse) by Marcheta Sicard RN (offgoing nurse). Report included the following information SBAR, Kardex, Intake/Output, MAR and Recent Results.

## 2018-03-31 NOTE — H&P
PRE- OP History and Physical                             Subjective:     Dedrick Guevara is an 80 y.o. female who is being seen for R hip pain s/p GLF yesterday, per daughter the pt was found on the floor of the BR. Work up has reveled R fem neck fracture. Pt has dementia and was admitted 3/26-3/28 for anemia, UTI with bacteremia, and leukocytosis, her treatment during admission included stopping the eliquis on 3/26, transfusions, IV rocephin x 2 with po abx for 10 days, and hospice consult. Pt lives at home with her daughter and uses a cane to assist with ambulation.      Patient Active Problem List    Diagnosis Date Noted    Hip fracture ([address] 03/30/2018    Left leg DVT ([address] 03/27/2018    Gram-negative bacteremia 03/27/2018    Dementia with behavioral disturbance 03/26/2018    Anemia 03/26/2018    Hypotension 02/14/2018    UTI (urinary tract infection) 02/13/2018    Sepsis ([address] 02/13/2018    Malignant neoplasm of ascending colon ([address] 08/30/2016    Intussusception, ileocecal ([address] 08/27/2016    Intussusception of small intestine ([address] 08/26/2016    Depression 03/18/2016    SBO (small bowel obstruction) ([address] 10/17/2015    Essential hypertension 06/05/2015    MR (mitral regurgitation)     Encounter for long-term (current) use of other medications 08/05/2010    Hypercholesterolemia     MVP (mitral valve prolapse)     DDD (degenerative disc disease), cervical     Esophagitis      Past Medical History:   Diagnosis Date    Anemia     Anxiety     DDD (degenerative disc disease), cervical     Depression     Esophagitis     FH: breast cancer     FH: hypertension     Hypercholesterolemia     Hypertension     MR (mitral regurgitation)     MVP (mitral valve prolapse)     Stool color black     Thromboembolus ([address] 03/20/2018      Past Surgical History:   Procedure Laterality Date    ABDOMEN SURGERY PROC UNLISTED  2015     and    2016    Bowel Blockage  DILATE ESOPHAGUS  6/3/2011         ENDOSCOPY, COLON, DIAGNOSTIC      due 2016    HX ABDOMINAL LAPAROSCOPY  10/2015    lap enterolysis for obstructing solitary adhesive band    HX CATARACT REMOVAL      HX GI      esoph dilatation    HX GI  08/27/2016    LAPAROTOMY EXPLORATORY, Ileocecectomy.  HX HYSTERECTOMY      HX ORTHOPAEDIC      R shoulder replacement    NV EGD TRANSORAL BIOPSY SINGLE/MULTIPLE  6/3/2011           Prior to Admission medications    Medication Sig Start Date End Date Taking? Authorizing Provider   ALPRAZolam Keith Monzon) 0.25 mg tablet Take 0.25 mg by mouth two (2) times a day. Yes Alphonse Jean MD   metoprolol succinate (TOPROL-XL) 50 mg XL tablet Take 50 mg by mouth nightly. Yes Alphonse Jean MD   diphenhydrAMINE-acetaminophen (TYLENOL PM EXTRA STRENGTH)  mg tab Take 1 Tab by mouth nightly. Yes Alphonse Jean MD   acetaminophen (TYLENOL) 500 mg tablet Take 1,000 mg by mouth every six (6) hours as needed for Pain. Yes Alphonse Jean MD   cephALEXin (KEFLEX) 500 mg capsule Take 1 Cap by mouth four (4) times daily for 10 days. 3/28/18 4/7/18 Yes Noel Montalvo MD   sertraline (ZOLOFT) 50 mg tablet Take 75 mg by mouth daily. Yes Historical Provider     Allergies   Allergen Reactions    Bactrim [Sulfamethoxazole-Trimethoprim] Rash      Social History   Substance Use Topics    Smoking status: Never Smoker    Smokeless tobacco: Never Used    Alcohol use No      Family History   Problem Relation Age of Onset    Cancer Mother      breast    Hypertension Mother     Diabetes Mother     Cancer Father      head and neck    Hypertension Father     Cancer Brother     Cancer Maternal Aunt       Review of Systems  A comprehensive review of systems was negative except for that written in the HPI.     Objective:     Patient Vitals for the past 8 hrs:   BP Temp Pulse Resp SpO2 Height   03/31/18 1620 153/83 97.9 °F (36.6 °C) (!) 110 15 92 % 5' 4\" (1.626 m)   03/31/18 1509 (!) 163/100 98.7 °F (37.1 °C) (!) 114 18 93 % -   03/31/18 1150 150/88 97.7 °F (36.5 °C) (!) 115 18 93 % -     Visit Vitals    /83 (BP 1 Location: Right arm, BP Patient Position: At rest)    Pulse (!) 110    Temp 97.9 °F (36.6 °C)    Resp 15    Ht 5' 4\" (1.626 m)    Wt 58.1 kg (128 lb 1.4 oz)    LMP  (LMP Unknown)    SpO2 92%    BMI 21.31 kg/m2     General:  Alert, cooperative, no distress, appears stated age. Head:  Normocephalic, without obvious abnormality, atraumatic. Eyes:  Conjunctivae/corneas clear. PERRL, EOMs intact. Ears:  Normal TMs and external ear canals both ears. Nose: Nares normal. Septum midline. Mucosa normal. No drainage or sinus tenderness. Throat: Lips, mucosa, and tongue normal. Teeth and gums normal.   Neck: Supple, symmetrical, trachea midline, no adenopathy, thyroid: no enlargement/tenderness/nodules, no carotid bruit and no JVD. Back:   Symmetric, no curvature. ROM normal. No CVA tenderness. Lungs:   Clear to auscultation bilaterally. Chest wall:  No tenderness or deformity. Heart:  Regular rate and rhythm, S1, S2, no murmur, click, rub or gallop. Abdomen:   Soft, non-tender. Bowel sounds normal. No masses,  No organomegaly. Extremities: Extremities normal except LLE is neurovascularly intact, TTP left hip, atraumatic, no cyanosis or edema. Pulses: 2+ and symmetric all extremities. Skin: Skin color, texture, turgor normal. No rashes or lesions   Lymph nodes: Cervical, supraclavicular, and axillary nodes normal.   Neurologic: CNII-XII intact. Neurovascular exam intact in distal extremities        Imaging Review  FINDINGS:   AP view of the pelvis with attempted frog leg lateral view of the right hip were  obtained. There is a right subcapital femoral neck fracture with compression and no  definite displacement. Left hip and bony pelvis are otherwise intact.      IMPRESSION  IMPRESSION: Right femoral neck fracture.     Assessment:     Active Problems:    Hip fracture ([address] (3/30/2018)        Plan:   Patient suffered a Right femoral neck fracture from GLF. Operative and non-operative treatments have been discussed with the patient including risks and benefits of each. Family understands the risks to include bleeding, infection, blood clot and inability to reduce pain. After consideration of risks, benefits limitations to the consented procedures and alternative options for treatment, the patient has consented to surgical interventions to include a Right Hip Hemiarthroplasty. Questions were answered and Pre-op teaching was completed.       ANA MARIA Galindo

## 2018-03-31 NOTE — PROGRESS NOTES
Initial Nutrition Assessment:    INTERVENTIONS/RECOMMENDATIONS:   · Advance diet when medically feasible  · Oral nutrition supplements TID once diet advanced  · Consider MVI    ASSESSMENT:   Chart reviewed, medically noted for hip fracture, severe dementia and PMH shown below. Nutrition referral triggered due to MST score. Attempted to visit with pt and family however they requested I come back at a later time because pt was in a lot of pain. Notes from recent past admission document poor PO intake and >15 lbs weight loss over the past 6 months. Question the accuracy of current weight, weight from 3/26 had pt at 107 lbs, current weight is 128 lbs. Decreased PO intake common with dementia pt. Past Medical History:   Diagnosis Date    Anemia     Anxiety     DDD (degenerative disc disease), cervical     Depression     Esophagitis     FH: breast cancer     FH: hypertension     Hypercholesterolemia     Hypertension     MR (mitral regurgitation)     MVP (mitral valve prolapse)     Stool color black     Thromboembolus ([address] 03/20/2018       Diet Order: NPO  % Eaten:  No data found.     Pertinent Medications: []Reviewed: pericolace  Pertinent Labs: []Reviewed: K+ 3.3,   Food Allergies: []NKFA  []Other   Last BM:   Edema:        []RUE   []LUE   []RLE   [x]LLE 2+     Pressure Injury:      [] Stage I   [] Stage II   [] Stage III   [] Stage IV      Wt Readings from Last 30 Encounters:   03/30/18 58.1 kg (128 lb 1.4 oz)   03/26/18 48.6 kg (107 lb 3.2 oz)   03/20/18 48.5 kg (107 lb)   03/20/18 48.8 kg (107 lb 9.6 oz)   02/27/18 50.6 kg (111 lb 9.6 oz)   02/17/18 55.4 kg (122 lb 2.2 oz)   02/13/18 52.7 kg (116 lb 3.2 oz)   11/01/17 49.4 kg (109 lb)   09/15/17 47.6 kg (105 lb)   07/31/17 49.4 kg (109 lb)   06/30/17 50.8 kg (112 lb)   05/31/17 50.9 kg (112 lb 3.2 oz)   01/11/17 48.4 kg (106 lb 12.8 oz)   12/12/16 45.4 kg (100 lb)   09/23/16 47.2 kg (104 lb)   09/15/16 46.4 kg (102 lb 6.4 oz)   09/08/16 50.8 kg (112 lb)   08/27/16 49.9 kg (110 lb)   06/15/16 50.1 kg (110 lb 6.4 oz)   04/26/16 50.3 kg (110 lb 12.8 oz)   04/22/16 49.9 kg (110 lb)   04/22/16 49.9 kg (110 lb)   03/18/16 49.9 kg (110 lb)   11/05/15 47.4 kg (104 lb 6.4 oz)   10/21/15 51.3 kg (113 lb)   06/18/15 50.4 kg (111 lb 3.2 oz)   06/05/15 52.3 kg (115 lb 3.2 oz)   04/30/15 52.7 kg (116 lb 3.2 oz)   01/28/15 52.1 kg (114 lb 12.8 oz)   08/04/14 52.9 kg (116 lb 9.6 oz)       Anthropometrics:   Height:   Weight: 58.1 kg (128 lb 1.4 oz)   IBW (%IBW):   ( ) UBW (%UBW):   (  %)   Last Weight Metrics:  Weight Loss Metrics 3/30/2018 3/26/2018 3/26/2018 3/20/2018 3/20/2018 2/27/2018 2/17/2018   Today's Wt 128 lb 1.4 oz 105 lb 13.1 oz 107 lb 3.2 oz 107 lb 107 lb 9.6 oz 111 lb 9.6 oz 122 lb 2.2 oz   BMI 21.31 kg/m2 17.61 kg/m2 17.84 kg/m2 17.81 kg/m2 17.91 kg/m2 18.57 kg/m2 -       BMI: Body mass index is 21.31 kg/(m^2). This BMI is indicative of:   []Underweight    [x]Normal    []Overweight    [] Obesity   [] Extreme Obesity (BMI>40)     Estimated Nutrition Needs (Based on):   1250 Kcals/day (BMR: 1040 x 1.2) , 75 g (1.2 g/kg) Protein  Carbohydrate:  At Least 130 g/day  Fluids: 1250 mL/day (1ml/kcal) or per primary team    NUTRITION DIAGNOSES:   Problem:  Inadequate protein-energy intake      Etiology: related to dementia     Signs/Symptoms: as evidenced by EMR documentation       NUTRITION INTERVENTIONS:  Meals/Snacks: General/healthful diet   Supplements: Commercial supplement              GOAL:   consume >50% of meals and ONS in 2-4 days    LEARNING NEEDS (Diet, Food/Nutrient-Drug Interaction):    [x] None Identified   [] Identified and Education Provided/Documented   [] Identified and Pt declined/was not appropriate     Cultureal, Congregation, OR Ethnic Dietary Needs:    [x] None Identified   [] Identified and Addressed     [x] Interdisciplinary Care Plan Reviewed/Documented    [x] Discharge Planning:   General healthy diet with adequate kcal and protein, ONS prn to meet nutrition needs    MONITORING /EVALUATION:      Food/Nutrient Intake Outcomes:  Total energy intake  Physical Signs/Symptoms Outcomes: Weight/weight change, Electrolyte and renal profile, Glucose profile, GI    NUTRITION RISK:    [x] High              [] Moderate           []  Low  []  Minimal/Uncompromised    PT SEEN FOR:    []  MD Consult: []Calorie Count      []Diabetic Diet Education        []Diet Education     []Electrolyte Management     []General Nutrition Management and Supplements     []Management of Tube Feeding     []TPN Recommendations    [x]  RN Referral:  [x]MST score >=2     []Enteral/Parenteral Nutrition PTA     []Pregnant: Gestational DM or Multigestation     []Pressure Ulcer/Wound Care needs        []  Low BMI  []  LOS Referral       Katie Mejía RDN  Pager [phone]  Weekend Pager [phone]

## 2018-03-31 NOTE — ROUTINE PROCESS
Patient: Scott Pérez MRN: [id number]  SSN: [id number]   YOB: 1934  Age: 80 y.o. Sex: female     Patient is status post Procedure(s):  right HIP HEMIARTHROPLASTY. Surgeon(s) and Role: Adriel Lemus MD - Primary                  Peripheral IV 03/30/18 Left Wrist (Active)   Site Assessment Clean, dry, & intact 3/31/2018  4:20 PM   Phlebitis Assessment 0 3/31/2018  4:20 PM   Infiltration Assessment 0 3/31/2018  4:20 PM   Dressing Status Clean, dry, & intact 3/31/2018  4:20 PM   Dressing Type Transparent;Tape 3/31/2018  4:20 PM   Hub Color/Line Status Blue; Infusing 3/31/2018  4:20 PM       Peripheral IV 03/31/18 Right Wrist (Active)            Airway - Endotracheal Tube 03/31/18 Oral (Active)                   Dressing/Packing:  Wound Shoulder Right-DRESSING TYPE: Open to air (03/31/18 1620)  Wound Hip Right-DRESSING TYPE: 4 x 4;ABD pad; Oil emulsion;Special tape (comment) (03/31/18 5188)  Splint/Cast:  ]

## 2018-03-31 NOTE — ANESTHESIA POSTPROCEDURE EVALUATION
Post-Anesthesia Evaluation and Assessment    Patient: Vida Mcneal MRN: [id number]  SSN: [id number]    YOB: 1934  Age: 80 y.o. Sex: female       Cardiovascular Function/Vital Signs  Visit Vitals    /70    Pulse (!) 120    Temp 36.7 °C (98 °F)    Resp 19    Ht 5' 4\" (1.626 m)    Wt 58.1 kg (128 lb 1.4 oz)    SpO2 99%    BMI 21.31 kg/m2       Patient is status post general anesthesia for Procedure(s):  right HIP HEMIARTHROPLASTY. Nausea/Vomiting: None    Postoperative hydration reviewed and adequate. Pain:  Pain Scale 1: FLACC (03/31/18 1929)  Pain Intensity 1: 0 (03/31/18 1900)   Managed    Neurological Status:   Neuro (WDL): Exceptions to WDL (03/31/18 1620)  Neuro  Neurologic State: Agitated; Restless (03/31/18 1900)  Cognition: Decreased command following (03/31/18 1900)  LUE Motor Response: Purposeful;Spontaneous ;Weak (03/31/18 1620)  LLE Motor Response: Purposeful;Spontaneous ;Weak;Other(comment) (very limited mobility - right femur fracture) (03/31/18 1620)  RUE Motor Response: Purposeful;Spontaneous ;Weak (03/31/18 1620)  RLE Motor Response: Purposeful;Spontaneous ;Weak (03/31/18 1620)   At baseline    Mental Status and Level of Consciousness: Arousable    Pulmonary Status:   O2 Device: Nasal cannula (03/31/18 1910)   Adequate oxygenation and airway patent    Complications related to anesthesia: None    Post-anesthesia assessment completed.  No concerns    Signed By: Su Najera MD     March 31, 2018

## 2018-03-31 NOTE — PROGRESS NOTES
Spiritual Care Partner Volunteer visited patient in Ortho on March 31, 2018.   Documented by:  KRISH Pierre, [facility], Chaplain [facility] Paging Service  [phone]

## 2018-03-31 NOTE — PERIOP NOTES
TRANSFER - IN REPORT:    Verbal report received from [place]) on [facility]  being received from [phone] (unit) for ordered procedure      Report consisted of patients Situation, Background, Assessment and   Recommendations(SBAR). Information from the following report(s) SBAR, Kardex, ED Summary, OR Summary, Procedure Summary, Intake/Output, MAR, Accordion, Recent Results, Med Rec Status and Alarm Parameters  was reviewed with the receiving nurse. Opportunity for questions and clarification was provided. Assessment completed upon patients arrival to unit and care assumed.

## 2018-03-31 NOTE — PROGRESS NOTES
Ortho Progress Note:    Pt resting in bed, no c/o's at this time  Daughter at bedside, discussed plan for surgery this afternoon with Dr. Vicky Esteves in position of comfort

## 2018-03-31 NOTE — PERIOP NOTES
TRANSFER - OUT REPORT:    Verbal report given to [facility], RN (name) on Yasmine Bhatt  being transferred to [facility] (unit) for routine post - op       Report consisted of patients Situation, Background, Assessment and   Recommendations(SBAR). Information from the following report(s) SBAR, Kardex, OR Summary, Procedure Summary, Intake/Output and MAR was reviewed with the receiving nurse. Lines:   Peripheral IV 03/30/18 Left Wrist (Active)   Site Assessment Clean, dry, & intact 3/31/2018  7:02 PM   Phlebitis Assessment 0 3/31/2018  7:02 PM   Infiltration Assessment 0 3/31/2018  7:02 PM   Dressing Status Clean, dry, & intact 3/31/2018  7:02 PM   Dressing Type Tape;Transparent 3/31/2018  7:02 PM   Hub Color/Line Status Capped 3/31/2018  7:02 PM       Peripheral IV 03/31/18 Right Wrist (Active)   Site Assessment Clean, dry, & intact 3/31/2018  7:02 PM   Phlebitis Assessment 0 3/31/2018  7:02 PM   Infiltration Assessment 0 3/31/2018  7:02 PM   Dressing Status Clean, dry, & intact 3/31/2018  7:02 PM   Dressing Type Tape;Transparent 3/31/2018  7:02 PM   Hub Color/Line Status Patent; Infusing 3/31/2018  7:02 PM        Opportunity for questions and clarification was provided.       Patient transported with:   Monitor  O2 @ 3 liters  Registered Nurse

## 2018-03-31 NOTE — ROUTINE PROCESS
Bedside and Verbal shift change report given to STEF Layne (oncoming nurse) by Shaq Tate RN (offgoing nurse). Report included the following information SBAR, Kardex, Intake/Output, MAR, Recent Results and Med Rec Status.

## 2018-03-31 NOTE — PERIOP NOTES
Handoff Report from Operating Room to PACU    Report received from [facility] and [facility] regarding Eleni Moran. Surgeon(s):  Nataliia Arrington MD  And Procedure(s) (LRB):  right HIP HEMIARTHROPLASTY (Right)  confirmed   with allergies and dressings discussed. Anesthesia type, drugs, patient history, complications, estimated blood loss, vital signs, intake and output, and last pain medication and reversal medications were reviewed.

## 2018-03-31 NOTE — PROGRESS NOTES
Hospitalist Progress Note    NAME: Eleni Moran   :  1934   MRN:  [id number]       Assessment / Plan:  R hip fracture POA  from GLF at home in bathroom  Possible rotator cuff tear POA  -XR showed R femoral neck fracture    DVT and pain control per ortho team.    Given her dementia and severe sundowning hx. Would limit narcotic use  Preop clearance - refer to H&P note  Pt/family agreeable to proceed with ORIF  Pt not rehab-able, plan to resume Home hospice on discharge  DDNR on chart     Hypokalemia POA- improved, mild  S/p IV KCL in ER  PO K+ today  -monitor     Recent Gram neg bacteremia   due to UTI   UA looks neg this admission  Bcx remains pending    Will cont' home keflex to compelte 10 days     Fall, mechanical vs syncope?  -unable to perform orthostatic vitals. Head CT neg   -given pt's hospice status and daughter is in agreement with no aggressive measure. Agree will hold further work up to include MRI, EEG. I have discussed with pt's daughter who agrees.     Acute on chronic anemia with leukopenia  Acute L leg DVT  -monitor H/H closely. She may need more transfusion post op  -family opted for hospice on last discharge. Also against systemic OAc or IVC filter     Severe dementia with hx of sundowning  Anxiety and agitation, per daughter, difficult to control. 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 Recommended Disposition: Home with hospice, CM consulted     Subjective:     Chief Complaint / Reason for Physician Visit: F/U R hip fracture, R shoulder pain, Dementia, UTI  \"I am fine\". Discussed with RN events overnight.      Review of Systems:  Symptom Y/N Comments  Symptom Y/N Comments   Fever/Chills    Chest Pain     Poor Appetite    Edema     Cough    Abdominal Pain     Sputum    Joint Pain SOB/HENDERSON    Pruritis/Rash     Nausea/vomit    Tolerating PT/OT     Diarrhea    Tolerating Diet     Constipation    Other       Could NOT obtain due to: Dementia     Objective:     VITALS:   Last 24hrs VS reviewed since prior progress note. Most recent are:  Patient Vitals for the past 24 hrs:   Temp Pulse Resp BP SpO2   03/31/18 0809 98.5 °F (36.9 °C) (!) 105 18 169/89 97 %   03/31/18 0400 97.5 °F (36.4 °C) (!) 105 14 165/87 98 %   03/31/18 0230 - 97 16 165/71 100 %   03/31/18 0137 - (!) 104 18 173/81 99 %   03/31/18 0100 - (!) 105 - 163/80 -   03/31/18 0030 - (!) 107 16 163/75 96 %   03/30/18 2357 - (!) 105 18 170/66 100 %   03/30/18 2327 98.4 °F (36.9 °C) (!) 105 16 155/83 99 %   03/30/18 1953 98.3 °F (36.8 °C) (!) 112 18 145/69 98 %   03/30/18 1523 - 71 18 159/60 93 %   03/30/18 1500 - 68 27 169/64 100 %   03/30/18 1430 - 71 21 164/63 99 %   03/30/18 1400 - 74 22 170/67 99 %   03/30/18 1310 - 86 20 (!) 181/103 (!) 79 %       Intake/Output Summary (Last 24 hours) at 03/31/18 1223  Last data filed at 03/31/18 0952   Gross per 24 hour   Intake             1295 ml   Output             1500 ml   Net             -205 ml        PHYSICAL EXAM:  General: WD, WN. Alert, somewhat cooperative, no acute distress    EENT:  EOMI. Anicteric sclerae. MMM  Resp:  CTA bilaterally, no wheezing or rales. No accessory muscle use  CV:  Regular  rhythm,  No edema  GI:  Soft, Non distended, Non tender.  +Bowel sounds  Neurologic:  Alert and oriented X 1, normal speech,   Psych:   Poor insight. Not anxious nor agitated, dementia noted +  Skin:  No rashes.   No jaundice    Reviewed most current lab test results and cultures  YES  Reviewed most current radiology test results   YES  Review and summation of old records today    NO  Reviewed patient's current orders and MAR    YES  PMH/SH reviewed - no change compared to H&P  ________________________________________________________________________  Care Plan discussed with:    Comments Patient x    Family  x Daughter at bedside   RN x    Care Manager     Consultant                        Multidiciplinary team rounds were held today with , nursing, pharmacist and clinical coordinator. Patient's plan of care was discussed; medications were reviewed and discharge planning was addressed. ________________________________________________________________________  Total NON critical care TIME:  36   Minutes    Total CRITICAL CARE TIME Spent:   Minutes non procedure based      Comments   >50% of visit spent in counseling and coordination of care     ________________________________________________________________________  Carina Germain MD     Procedures: see electronic medical records for all procedures/Xrays and details which were not copied into this note but were reviewed prior to creation of Plan. LABS:  I reviewed today's most current labs and imaging studies.   Pertinent labs include:  Recent Labs      03/31/18   0525  03/30/18   1111   WBC  1.9*  1.6*   HGB  8.5*  7.6*   HCT  25.4*  23.2*   PLT  272  348     Recent Labs      03/31/18   0525  03/30/18   1147  03/30/18   1111   NA  140   --   142   K  3.3*   --   3.1*   CL  108   --   110*   CO2  25   --   26   GLU  91   --   95   BUN  11   --   14   CREA  0.62   --   0.89   CA  7.7*   --   8.4*   ALB   --    --   3.0*   TBILI   --    --   0.4   SGOT   --    --   26   ALT   --    --   18   INR   --   1.2*   --        Signed: Carina Germain MD

## 2018-04-01 NOTE — ROUTINE PROCESS
Pt. bp low 73/44 paged on-call dr. Elizabeth Alamo 500 bolus to be given over one hr and if bp does not improve one more bolus over another hour

## 2018-04-01 NOTE — PROGRESS NOTES
Spoke with dr. Chucky Blackwell during daily rounds regarding concerns of pulling madison catheter due to pt's limited mobility and comfort since pt will be leaving back on hospice upon discharge. MD stated it will be okay to leave madison in for now for comfort measures. Notified donna perez np as well regarding madison being left in for comfort.

## 2018-04-01 NOTE — PROGRESS NOTES
IP consult for [address] has been addressed. SW sent note via CC informing [facility] to contact the pt and/or family as pt would be ready in the next 24 hours. SW will continue to follow and assist with additional discharge needs.     NATIVIDAD Martins  Ext [phone]

## 2018-04-01 NOTE — PROGRESS NOTES
Problem: Mobility Impaired (Adult and Pediatric)  Goal: *Acute Goals and Plan of Care (Insert Text)  Physical Therapy Goals  Initiated 4/1/2018  1. Patient will move from supine to sit and sit to supine  in bed with moderate assistance  within 7 day(s). 2.  Patient will transfer from bed to chair and chair to bed with moderate assistance  using the least restrictive device within 7 day(s). 3.  Patient will perform sit to stand with moderate assistance  within 7 day(s). 4.  Patient will ambulate with moderate assistance  for 5 feet with the least restrictive device within 7 day(s). physical Therapy EVALUATION  Patient: Leeann Favre [de-identified]80 y.o. female)  Date: 4/1/2018  Primary Diagnosis: RIGHT HIP FRACTURE  Hip fracture ([facility] [address]  right hip fracture  Procedure(s) (LRB):  RIGHT HIP HEMIARTHROPLASTY (Right) 1 Day Post-Op   Precautions:   Fall, WBAT, DNR    ASSESSMENT :  Based on the objective data described below, the patient presents with decreased functional mobility, impaired balance, increased pain, baseline dementia limiting tolerance and participation in therapy. Patient received supine in bed with granddaughter present. Patient oriented to self only. Patient was recently discharge from [address] [place] [address] on 3/28/18 home with hospice care. Patient was living with her family in a 1 story home and was needing physical assist for any mobility with RW. Patient tolerated evaluation fairly. Patient's BP stable with positional changes. Patient required max assist x 2 for supine<>sit edge of bed. Initial seated balance impaired demonstrating posterior and L lateral lean away from surgical LE, but improved once bilateral feet on the floor. Attempted sit<>stand with total assist, but patient resistant to assistance and lifting RLE off the floor. Patient assisted back to bed with max assist x 2 while grabbing/pulling at therapist's shirt collar. Patient will continue to benefit from PT to progress mobility as tolerated and as able. Patient most appropriate to be seen daily given her baseline cognitive deficits and recent discharge home with hospice care. At this time would recommend SNF, however will discuss with medical team and family based on their wishes. Patient will benefit from skilled intervention to address the above impairments. Patients rehabilitation potential is considered to be Guarded  Factors which may influence rehabilitation potential include:   []         None noted  [x]         Mental ability/status  [x]         Medical condition  []         Home/family situation and support systems  []         Safety awareness  [x]         Pain tolerance/management  []         Other:      PLAN :  Recommendations and Planned Interventions:  [x]           Bed Mobility Training             [x]    Neuromuscular Re-Education  [x]           Transfer Training                   []    Orthotic/Prosthetic Training  [x]           Gait Training                         []    Modalities  [x]           Therapeutic Exercises           []    Edema Management/Control  [x]           Therapeutic Activities            [x]    Patient and Family Training/Education  []           Other (comment):    Frequency/Duration: Patient will be followed by physical therapy  daily to address goals. Discharge Recommendations: [facility] Aval[facility] and To Be Determined pending discussion with medical team and family (pt was recently discharge home with hospice on 3/28/18)  Further Equipment Recommendations for Discharge: TBD     SUBJECTIVE:   Patient stated What's my name? Jaciel Haq    OBJECTIVE DATA SUMMARY:   HISTORY:    Past Medical History:   Diagnosis Date    Anemia     Anxiety     DDD (degenerative disc disease), cervical     Depression     Esophagitis     FH: breast cancer     FH: hypertension     Hypercholesterolemia     Hypertension     MR (mitral regurgitation)     MVP (mitral valve prolapse)     Stool color black     Thromboembolus ([facility] [address] 03/20/2018 Past Surgical History:   Procedure Laterality Date    ABDOMEN SURGERY PROC UNLISTED  2015     and    2016    Bowel Blockage    DILATE ESOPHAGUS  6/3/2011         ENDOSCOPY, COLON, DIAGNOSTIC      due 2016    HX ABDOMINAL LAPAROSCOPY  10/2015    lap enterolysis for obstructing solitary adhesive band    HX CATARACT REMOVAL      HX GI      esoph dilatation    HX GI  08/27/2016    LAPAROTOMY EXPLORATORY, Ileocecectomy.  HX HYSTERECTOMY      HX ORTHOPAEDIC      R shoulder replacement    CT EGD TRANSORAL BIOPSY SINGLE/MULTIPLE  6/3/2011          Prior Level of Function/Home Situation: see above  Personal factors and/or comorbidities impacting plan of care:     Home Situation  Home Environment: Private residence  # Steps to Enter: 4  One/Two Story Residence: One story  Living Alone: No  Support Systems: Family member(s)  Patient Expects to be Discharged to[de-identified] Rehabilitation facility  Current DME Used/Available at Home: Walker, rolling    EXAMINATION/PRESENTATION/DECISION MAKING:   Critical Behavior:  Neurologic State: Drowsy, Confused  Orientation Level: Oriented to person  Cognition: Decreased command following, Impaired decision making     Hearing: Auditory  Auditory Impairment: None  Skin:    Edema:   Range Of Motion:  AROM: Generally decreased, functional (RLE limited by pain and resistance)                       Strength:    Strength: Generally decreased, functional (RLE limited by pain and resistance)                    Tone & Sensation:                                  Coordination:     Vision:      Functional Mobility:  Bed Mobility:  Rolling: Maximum assistance  Supine to Sit: Maximum assistance;Assist x2  Sit to Supine: Maximum assistance;Assist x2  Scooting: Maximum assistance  Transfers:  Sit to Stand:  Total assistance (unable to achieve standing position, pt resistant)  Stand to Sit: Total assistance                       Balance:   Sitting: Impaired  Sitting - Static: Fair (occasional)  Sitting - Dynamic: Poor (constant support)  Standing: Impaired  Standing - Static:  (unable to achieve standing position)  Ambulation/Gait Training:                                                         Stairs: Therapeutic Exercises:       Functional Measure:  Barthel Index:    Bathin  Bladder: 0  Bowels: 0  Groomin  Dressin  Feedin  Mobility: 0  Stairs: 0  Toilet Use: 0  Transfer (Bed to Chair and Back): 5  Total: 5       Barthel and G-code impairment scale:  Percentage of impairment CH  0% CI  1-19% CJ  20-39% CK  40-59% CL  60-79% CM  80-99% CN  100%   Barthel Score 0-100 100 99-80 79-60 59-40 20-39 1-19   0   Barthel Score 0-20 20 17-19 13-16 9-12 5-8 1-4 0      The Barthel ADL Index: Guidelines  1. The index should be used as a record of what a patient does, not as a record of what a patient could do. 2. The main aim is to establish degree of independence from any help, physical or verbal, however minor and for whatever reason. 3. The need for supervision renders the patient not independent. 4. A patient's performance should be established using the best available evidence. Asking the patient, friends/relatives and nurses are the usual sources, but direct observation and common sense are also important. However direct testing is not needed. 5. Usually the patient's performance over the preceding 24-48 hours is important, but occasionally longer periods will be relevant. 6. Middle categories imply that the patient supplies over 50 per cent of the effort. 7. Use of aids to be independent is allowed. Shawna Rubalcava., Barthel, D.W. (8714). Functional evaluation: the Barthel Index. [facility] (14)2. Yenny Kirby, ANDRAEJTONIAF, Janee Maddox., Charis Apley., Kamar Spaulding, [address] [place] [address] (). Measuring the change indisability after inpatient rehabilitation; comparison of the responsiveness of the Barthel Index and Functional [place] Measure.  Journal of Neurology, Neurosurgery, and Psychiatry, 664), 187-782. RADHA Weinstein, STEVEN Aguillon, & Cristal Schaefer M.A. (2004.) Assessment of post-stroke quality of life in cost-effectiveness studies: The usefulness of the Barthel Index and the EuroQoL-5D. Quality of Life Research, 13, 735-43         G codes: In compliance with CMSs Claims Based Outcome Reporting, the following G-code set was chosen for this patient based on their primary functional limitation being treated: The outcome measure chosen to determine the severity of the functional limitation was the Barthel with a score of 5/100 which was correlated with the impairment scale. ? Mobility - Walking and Moving Around:     - CURRENT STATUS: CM - 80%-99% impaired, limited or restricted    - GOAL STATUS: CL - 60%-79% impaired, limited or restricted    - D/C STATUS:  ---------------To be determined---------------       Based on the above components, the patient evaluation is determined to be of the following complexity level: LOW     Pain:  Pain Scale 1: Visual  Pain Intensity 1: 0              Activity Tolerance:   Fair. VSS during positional changes. Pt resistant to therapist's assistance  Please refer to the flowsheet for vital signs taken during this treatment. After treatment:   []         Patient left in no apparent distress sitting up in chair  [x]         Patient left in no apparent distress in bed  [x]         Call bell left within reach  [x]         Nursing notified  [x]         Caregiver present  [x]         Bed alarm activated    COMMUNICATION/EDUCATION:   The patients plan of care was discussed with: Registered Nurse.  []         Fall prevention education was provided and the patient/caregiver indicated understanding. []         Patient/family have participated as able in goal setting and plan of care. []         Patient/family agree to work toward stated goals and plan of care.   []         Patient understands intent and goals of therapy, but is neutral about his/her participation. [x]         Patient is unable to participate in goal setting and plan of care.     Thank you for this referral.  Harvinder Peters, PT , DPT   Time Calculation: 17 mins

## 2018-04-01 NOTE — PROGRESS NOTES
Hospitalist Progress Note    NAME: Ricky Mathias   :  1934   MRN:  [id number]       Assessment / Plan:  R hip fracture POA- s/p ORIF  from GLF at home in bathroom  Possible rotator cuff tear POA  -XR showed R femoral neck fracture    DVT and pain control per ortho team.    Given her dementia and severe  hx. Would limit narcotic use  Preop clearance - refer to H&P note  S/p ORIF yesterday by ortho team  Pt not rehab-able, plan to resume Home hospice on discharge  DDNR on chart  WBAT RLE as per ortho at home     Hypokalemia POA- improved, mild, 3.4 today  S/p IV KCL in ER  PO K+ x1 again today  -monitor     Recent Gram neg bacteremia   due to UTI   UA looks neg this admission  Bcx remains pending    Will cont' home keflex to compelte 10 days     Fall, mechanical vs syncope?  -unable to perform orthostatic vitals. Head CT neg   -given pt's hospice status and daughter is in agreement with no aggressive measure. Agree will hold further work up to include MRI, EEG. I have discussed with pt's daughter who agrees.     Acute on chronic anemia with leukopenia  Acute L leg DVT  -monitor H/H closely. She may need more transfusion post op  -family opted for hospice on last discharge. Also against systemic OAc or IVC filter  NO DVT prophylaxis post op as above     Severe dementia with hx of - IV ativan prn here  Anxiety and agitation, per daughter, difficult to control. 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 Recommended Disposition: Home with hospice, CM consulted- likely DC in 24 hrs once cleared by Ortho     Subjective:     Chief Complaint / Reason for Physician Visit: F/U R hip fracture, R shoulder pain, Dementia, UTI  \"I am fine\". Discussed with RN events overnight.      Review of Systems:  Symptom Y/N Comments  Symptom Y/N Comments   Fever/Chills    Chest Pain     Poor Appetite    Edema     Cough    Abdominal Pain     Sputum    Joint Pain     SOB/HENDERSON    Pruritis/Rash     Nausea/vomit    Tolerating PT/OT     Diarrhea    Tolerating Diet     Constipation    Other       Could NOT obtain due to: Dementia     Objective:     VITALS:   Last 24hrs VS reviewed since prior progress note. Most recent are:  Patient Vitals for the past 24 hrs:   Temp Pulse Resp BP SpO2   04/01/18 0726 98.1 °F (36.7 °C) 67 18 127/79 96 %   04/01/18 0408 97.1 °F (36.2 °C) 61 16 121/55 99 %   04/01/18 0030 97.9 °F (36.6 °C) (!) 52 16 104/46 95 %   03/31/18 2200 98 °F (36.7 °C) (!) 54 15 115/56 99 %   03/31/18 2138 98 °F (36.7 °C) (!) 51 14 94/42 99 %   03/31/18 2055 98 °F (36.7 °C) (!) 56 14 (!) 73/54 100 %   03/31/18 2014 98.5 °F (36.9 °C) (!) 58 16 99/44 99 %   03/31/18 2000 - (!) 58 15 97/44 99 %   03/31/18 1955 - (!) 56 12 102/41 100 %   03/31/18 1945 98.1 °F (36.7 °C) (!) 102 12 104/45 99 %   03/31/18 1930 - (!) 116 10 97/52 100 %   03/31/18 1915 - (!) 120 19 128/70 99 %   03/31/18 1910 - (!) 120 20 133/70 100 %   03/31/18 1906 - (!) 119 18 - 100 %   03/31/18 1905 - (!) 120 21 136/72 100 %   03/31/18 1902 98 °F (36.7 °C) (!) 119 20 145/80 100 %   03/31/18 1901 - (!) 128 20 - 98 %   03/31/18 1900 - (!) 128 23 165/78 95 %   03/31/18 1858 - - - 145/80 -   03/31/18 1620 97.9 °F (36.6 °C) (!) 110 15 153/83 92 %   03/31/18 1509 98.7 °F (37.1 °C) (!) 114 18 (!) 163/100 93 %       Intake/Output Summary (Last 24 hours) at 04/01/18 1223  Last data filed at 03/31/18 1955   Gross per 24 hour   Intake           1712.5 ml   Output              650 ml   Net           1062.5 ml        PHYSICAL EXAM:  General: WD, WN. Alert, somewhat cooperative, no acute distress    EENT:  EOMI. Anicteric sclerae. MMM  Resp:  CTA bilaterally, no wheezing or rales.   No accessory muscle use  CV:  Regular  rhythm,  No edema  GI:  Soft, Non distended, Non tender.  +Bowel sounds  Neurologic:  Alert and oriented X 1, normal speech,   Psych:   Poor insight. Not anxious nor agitated, dementia noted +  Skin:  No rashes. No jaundice    Reviewed most current lab test results and cultures  YES  Reviewed most current radiology test results   YES  Review and summation of old records today    NO  Reviewed patient's current orders and MAR    YES  PMH/SH reviewed - no change compared to H&P  ________________________________________________________________________  Care Plan discussed with:    Comments   Patient x    Family  x Family at bedside   RN x    Care Manager x    Consultant  x Ortho NP [address] team rounds were held today with , nursing, pharmacist and clinical coordinator. Patient's plan of care was discussed; medications were reviewed and discharge planning was addressed. ________________________________________________________________________  Total NON critical care TIME:  16   Minutes    Total CRITICAL CARE TIME Spent:   Minutes non procedure based      Comments   >50% of visit spent in counseling and coordination of care     ________________________________________________________________________  Gina Lake MD     Procedures: see electronic medical records for all procedures/Xrays and details which were not copied into this note but were reviewed prior to creation of Plan. LABS:  I reviewed today's most current labs and imaging studies.   Pertinent labs include:  Recent Labs      04/01/18   0320  03/31/18   0525  03/30/18   1111   WBC   --   1.9*  1.6*   HGB  7.1*  8.5*  7.6*   HCT   --   25.4*  23.2*   PLT   --   272  348     Recent Labs      04/01/18   0320  03/31/18   0525  03/30/18   1147  03/30/18   1111   NA  142  140   --   142   K  3.4*  3.3*   --   3.1*   CL  108  108   --   110*   CO2  25  25   --   26   GLU  88  91   --   95   BUN  11  11   --   14   CREA  0.75  0.62   --   0.89   CA 7. 5*  7.7*   --   8.4*   ALB   --    --    --   3.0*   TBILI   --    --    --   0.4   SGOT   --    --    --   26   ALT   --    --    --   18   INR   --    --   1.2*   --        Signed: Yomaira Humphreys MD

## 2018-04-01 NOTE — PROGRESS NOTES
Orthopedic NP Progress Note  Post Op day: 1 Day Post-Op    April 1, 2018 12:18 PM     Dedrick Guevara    Attending Physician: Treatment Team: Attending Provider: Noel Montalvo MD; Consulting Provider: Edgar Henry NP; Consulting Provider: Tracy Green MD; Care Manager: Vick Keane; Utilization Review: Shayan Vaughn RN     Vital Signs:    Patient Vitals for the past 8 hrs:   BP Temp Pulse Resp SpO2   04/01/18 0726 127/79 98.1 °F (36.7 °C) 67 18 96 %          Intake/Output:     03/30 1901 - 04/01 0700  In: 3007.5 [I.V.:3007.5]  Out: 2150 [Urine:2050]    Pain Control:   Pain Assessment  Pain Scale 1: Numeric (0 - 10)  Pain Intensity 1: 0  Pain Location 1: Hip  Pain Orientation 1: Right  Pain Description 1: Aching  Pain Intervention(s) 1: Medication (see MAR), Cold pack    LAB:    Recent Labs      04/01/18   0320  03/31/18   0525   HCT   --   25.4*   HGB  7.1*  8.5*     Lab Results   Component Value Date/Time    Sodium 142 04/01/2018 03:20 AM    Potassium 3.4 (L) 04/01/2018 03:20 AM    Chloride 108 04/01/2018 03:20 AM    CO2 25 04/01/2018 03:20 AM    Glucose 88 04/01/2018 03:20 AM    BUN 11 04/01/2018 03:20 AM    Creatinine 0.75 04/01/2018 03:20 AM    Calcium 7.5 (L) 04/01/2018 03:20 AM       Subjective:  Dedrick Guevara is a 80 y.o. female s/p a  Procedure(s):  RIGHT HIP HEMIARTHROPLASTY   Procedure(s):  RIGHT HIP HEMIARTHROPLASTY. Tolerating diet. Resting comfortably in bed, daughter at bedside      Objective: General: alert, cooperative, no distress. Neuro/Vascular: CNS Intact. Sensation stable. Brisk cap refill, 2+ pulses UE/LE  Musculoskeletal:  +ROM UE/LE. Ramila's sign negative in bilateral lower extremities. Calves soft, supple, non-tender upon palpation or with passive stretch. Skin: Incision - clean, dry and intact. No significant erythema or swelling.     Dressing: clean, dry, and intact    Villanueva - Yes - continued per medicine   Drain - n       PT/OT:   Gait:                      Assessment:    s/p Procedure(s):  RIGHT HIP HEMIARTHROPLASTY    Active Problems:    Hip fracture ([address] (3/30/2018)         Plan:     -  Continue PT/OT - WBAT RLE    -  Continue established methods of pain control  -  VTE Prophylaxes - TEDS &/or SCDs, holding eliquis since Monday - will defer to medicine as to when to restart       Discharge To: Home with hospice, Monday or Tuesday     Dr. David Longoria aware and agree with plan.      Signed By: Yanelis Johns NP    Orthopedic Nurse Practitioner

## 2018-04-02 NOTE — HOSPICE
[facility] follow up on consultation visit note    Order for consult noted. History and events of this hospitalization reviewed. [facility] is able to admit patient tomorrow am and not today. CM advised and will contact MD for discharge tomorrow and will plan an early discharge, maybe about 0900      Discussed with daughter Symone Monreal. Understanding and appreciation expressed    [facility] for delivery in the am   Already has other DME needed delivered by us from [facility] prior to fall last week.       Please call [phone] if questions or concerns    Raffi Bland RN [facility]

## 2018-04-02 NOTE — DISCHARGE INSTRUCTIONS
HOSPITALIST DISCHARGE INSTRUCTIONS    NAME: Hu Fall   :  1934   MRN:  [id number]     Date/Time:  2018 8:28 AM    ADMIT DATE: 3/30/2018     DISCHARGE DATE: 2018     DISCHARGE DIAGNOSIS:  R hip fracture POA- s/p ORIF this admission  from [facility] at home in bathroom  Possible rotator cuff tear POA - conservative management only  Hypokalemia POA- replenished  Recent Gram neg bacteremia POA- cleared on Blood Cx this admission  due to UTI POA- cont kelfex as planned on last discharged as able- complete total 10 days (initial prescription)  Fall, mechanical vs syncope? - Pt going home on Hospice, fall precautions  Acute on chronic anemia with leukopenia  Recent Acute L leg DVT POA - pt/family deferred full anticoagulation as on last discharge- pt going home on Hospice  Severe dementia with hx of sundowning- Po ativan as before  Anxiety and agitation, per daughter, difficult to control.    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 · Keep your medication in the bottles provided by the pharmacist and keep a list of the medication names, dosages, and times to be taken in your wallet. · Do not take other medications without consulting your doctor. Pain Management: per above medications    What to do at [address]:  Comfort feeding    Recommended activity: bed rest, fall precautions at home    If you have questions regarding the hospital related prescriptions or hospital related issues please call [facility] at . Follow Up:  Dr. Zoraida Molina, NP  As needed  [facility] at home as set up. Information obtained by :  I understand that if any problems occur once I am at home I am to contact my physician.     I understand and acknowledge receipt of the instructions indicated above. Physician's or R.N.'s Signature                                                                  Date/Time                                                                                                                                              Patient or Representative Signature                                                          Date/Time                Kelley Day  Surgery: Hip Fracture Repair  Surgeon: Stephon Pitts MD  Surgery Date:  3/31/2018     To relieve pain:   Use ice/gel packs.  Put the ice pack directly over the wound, or anywhere you are hurting or swollen.  To control pain and swelling, keep ice on regularly, especially after physical activity.  The packs should stay cold for 3-4 hours. When it is not cold anymore, rotate with the packs in the freezer.  Elevate your leg. This will also keep swelling down.  Rest for at least 20 minutes between activity or exercises.  To keep track of your pain medications, write down what you take and when you take it.  The last dose of pain medication you got in the hospital was:     Medication    Dose    Date & Time         Choose your medications based on the pain scale below:     To keep your pain under control, take Tylenol every 6 hours for 14 days - even if you feel like you dont need it.  For mild to moderate pain (1-6 on pain scale), take one pain pill every 3-4 hours as needed.  For severe pain (7-10 on pain scale), take two pain pills every 3-4 hours as needed.  To prevent nausea, take your pain medications with food. Pain Scale                 As your pain lessens:     Slowly start taking less pain medication.  You may do this by waiting longer between doses or by taking smaller doses.  Stop using the pain medications as soon as you no longer need it, usually in 2-3 weeks.                                                OPSITE (Honeycomb dressing)     Keep your clear, waterproof dressing in place for 5-7 days after your leave the hospital.      If you are still having drainage, you will need to change your dressing in 5-7 days. You will be given one extra dressing to use at home.  If there is no more drainage from the wound, you may leave it open to the air. OPSITE DRESSING INSTRUCTIONS             DO NOTs:   Do not rub your surgical wound   Do not put lotion or oils on your wound.  Do not take a tub bath or go swimming until your doctor says it is ok.  You may shower with this dressing over your wound.  After showering pat the dressing dry.  If you have staples a home health nurse will remove them in about 10 days.  To increase and promote healing:     Stop Smoking (or at least cut back on       Smoking).  Eat a well-balanced diet (high in protein       and vitamin C).  If you have a poor appetite, drink Ensure, Glucerna, or CarnationInstant Breakfast for the next 30 days.  If you are diabetic, you should control your blood         sugars to prevent infection and help your wound         to heal.       To prevent constipation, stay active & drink plenty of fluid.  While using pain medications, you should also take stool softeners and laxatives, such as Pericolace and Miralax.  If you are having too many bowel movements, then you may need  to stop taking the laxatives.  You should have a bowel movement 3-4 days after surgery and then at least every other day while on pain medication.  To improve your recovery, you must be active!  WEIGHT BEARING   As Tolerated = You can put as much weight on your leg as you can tolerate while walking.  THERAPY   If you go to a rehab facility, physical therapy (PT) will need to work with you daily, and sometimes twice a day to teach you how to:     Get in and out of the bed   Walk (gait training) and climb stairs   Do exercises and gain strength   Use a walker, crutches or cane     You may also need to have occupational therapy (OT) work with you to help you practice:     Getting on and off the toilet   Self-care (brushing teeth, eating, bathing, etc)     If you go directly home, home health physical therapy will come the day after you leave the hospital.  They will visit about 4 times over the next couple of weeks to teach you how to get out of bed, to safely walk in your home, and to do your exercises.  NO DRIVING until your surgeon tells you it is ok.  You can return to work when cleared by a physician.  Please call your physician immediately if you have:     Constant bleeding from your wound.  Increasing redness or swelling around your wound (Some warmth, bruising and swelling is normal).  Change in wound drainage (increase in amount, color, or bad smell).  Change in mental status (unusual behavior)     Temperature over 101.5 degrees Fahrenheit     Increased pain, swelling, or redness in the calf (back of your lower leg), thigh, ankle or foot.  Emergency: CALL 911 if you have:   Shortness of breath   Chest pain when you cough or taking a deep breath     Please call your surgeons office at [place] [phone] for a follow up appointment.  You should call as soon as you get home or the next day after discharge. Ask to make an appointment in 2 weeks.

## 2018-04-02 NOTE — HOSPICE
[facility] follow up on consultation visit note     Order for consult noted.   History and events of this hospitalization reviewed.       TC to daughter Loreto Callahan who stated that discharge is being planned on for today.       I have reached out to [facility] intake department to see if we can do this admit today as we were just now made aware of the need.     Left message for MEJIA Levy-Jenn regarding the above and will let all parties know when I hear from our intake department.     Please call [phone] if questions or concerns     Lalitha Putnam RN [facility]

## 2018-04-02 NOTE — PROGRESS NOTES
[facility] transportation arranged for 9:00AM tomorrow from [facility] to pt's home. Pt will open with [facility] at home. CM updated pt's dtr and [facility] on d/c time. FOC on pt's chart. PCS on pt's chart. No further CM needs identified at this time. CM available for any additional needs.      Sandy Louise MSW  Care Manager

## 2018-04-02 NOTE — PROGRESS NOTES
Bedside and Verbal shift change report given to [address] [place] [address] (oncoming nurse) by Ezra Alcantar (offgoing nurse). Report included the following information SBAR, Kardex, Intake/Output and MAR.

## 2018-04-02 NOTE — HOSPICE
[facility] courtesy note. Events since admission noted .     Please call [phone] as discharge plans evolve if MDs recommend home hospice     Socorro Nicholson RN [facility]

## 2018-04-02 NOTE — DISCHARGE SUMMARY
[facility] follow up on consultation visit note    Order for consult noted. History and events of this hospitalization reviewed. TC to daughter Claire Matos who stated that discharge is being planned on for today. I have reached out to [facility] intake department to see if we can do this admit today as we were just now made aware of the need. Left message for MEJIA Deras regarding the above and will let all parties know when I hear from our intake department.     Please call [phone] if questions or concerns    Brain STEF Nair [facility]

## 2018-04-02 NOTE — PROGRESS NOTES
CM left message for intake at [facility] hospice to let them know pt to d/c home today. Waiting for return call. CM will continue to follow and assist with d/c planning.      Sheyla Montejo MSW  Care Manager

## 2018-04-02 NOTE — PROGRESS NOTES
OT orders received and chart was reviewed and per ANA MARIA Malin, pt to be going Hospice and OT to not eval and sign off.

## 2018-04-02 NOTE — OP NOTES
[address]  ACUTE CARE OP NOTE    Karrie Schaefer  MR#: [id number]  : 1934  ACCOUNT #: [de-identified]   DATE OF SERVICE: 2018    PREOPERATIVE DIAGNOSIS:  Right proximal femur fracture. POSTOPERATIVE DIAGNOSIS:  Right proximal femur fracture. PRIMARY PROCEDURE PERFORMED:  Right hemiprosthetic replacement, femur. SURGEON:  Denys Cornejo MD.    ASSISTANT:  ANA MARIA Mckinney.    ANESTHESIA:  get    COMPLICATIONS:  None. ESTIMATED BLOOD LOSS:  30 mL. SPECIMENS REMOVED:  Femoral head. IMPLANTS:  Biomet hemiarthroplasty. INDICATIONS:  The patient suffered a fall, traumatic fractured proximal femur, comes in for hemiprosthetic replacement. This is a complex surgical procedure requiring an assistant, and one was used. DESCRIPTION OF PROCEDURE:  The patient was brought in the operating theater, given airway, IV antibiotics, rolled over the right side up lateral position, bean bag exsufflated, downside axillary roll placed, downside peroneal nerve padded. Neck placed in neutral extension position with a folded pillow. Right hip prepped and draped, and after time out I made a lateral incision just proximal to the trochanter, split the TFL and the IT band, exposing the posterior short external rotators, cauterized the medial circumflex artery, divided the piriformis and tagged it for later repair, then reflected the short external rotators off the greater trochanter, exposing the capsule. I made a T capsulotomy to expose the fracture, tagging the corners for later repair. Fracture was identified. The head was removed, sized at a 47.   Then, we made the appropriate femoral neck cut one fingerbreadth above the lesser trochanter, rigidly reamed to a 10 and then broached to a 10 in 30 degrees of anteversion, then put in the final 10 Biomet fracture stem in 30 degrees of anteversion, standard neck, 47 head, reduced, trialled, good soft tissue tension in flexion and extension, no instability. Put in the final implants after copiously irrigated with bacitracin and saline pulsatile lavage, closed the capsule with #1 Vicryl suture, repaired the piriformis to the posterior trochanter with #1 Vicryl suture, then copiously irrigated again with pulsatile lavage and closed the IT band with interrupted figure-of-eight #1 Vicryl sutures, the subcutaneous fat with 2-0 Vicryl and the skin with staples, took the patient to the recovery room in stable condition.       MD YASSINE Luna / MN  D: 04/01/2018 19:53     T: 04/02/2018 07:14  JOB #: [id number]

## 2018-04-02 NOTE — DISCHARGE SUMMARY
Hospitalist Discharge Summary     Patient ID:  Khushboo Witt  [id number]  80 y.o.  1934    PCP on record: Kaycee Chopra NP    Admit date: 3/30/2018  Discharge date and time: 4/2/2018      DISCHARGE DIAGNOSIS:    R hip fracture POA- s/p ORIF this admission  from [facility] at home in bathroom  Possible rotator cuff tear POA - conservative management only  Hypokalemia POA- replenished  Recent Gram neg bacteremia POA- cleared on Blood Cx this admission  due to UTI POA- cont kelfex as planned on last discharged as able- complete total 10 days (initial prescription)  Fall, mechanical vs syncope? - Pt going home on Hospice, fall precautions  Acute on chronic anemia with leukopenia  Recent Acute L leg DVT POA - pt/family deferred full anticoagulation as on last discharge- pt going home on Hospice  Severe dementia with hx of sundowning- Po ativan as before  Anxiety and agitation, per daughter, difficult to control.    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  female with PMHx significant for HTN, HLD, and dementia present to ED via EMS for further evaluation of R hip pain s/p GLF from home. Patient was recently discharged on 3/28/17 with to complete abx treatment for UTI and bacteremia for which she completed 2 days of meds since discharge. Pt was also discharge to home with hospice at the time. Per daughter, pt was in her room today when daughter heard patient yelling for help. Daughter found pt on the floor in her bathroom. Daughter is unclear how the fall episode occurred. In the ER, pt c/o of R hip pain. Xr hip showed R femoral neck fracture. In the ER, vitals; T 98.1, P57, /60, Spo2 99% on RA  Labs: WBC 1.6, hgb 7.6, , K 3.1, cr 0.89  XR hip showed R femoral neck fracture. CT head neg for acute abnormalities   XR R shoulder showed possible right rotator cuff tear.  No evidence for hardware  loosening. We were asked to admit for work up and evaluation of the above problems\"    ______________________________________________________________________  DISCHARGE SUMMARY/HOSPITAL COURSE:  for full details see H&P, daily progress notes, labs, consult notes. R hip fracture POA- s/p ORIF  from [facility] at home in bathroom  Possible rotator cuff tear POA  -XR showed R femoral neck fracture     DVT and pain control per ortho team.    Given her dementia and severe sundowning hx.  Would limit narcotic use  Preop clearance - refer to H&P note  S/p ORIF yesterday by ortho team  Pt not rehab-able, plan to resume Home hospice on discharge  DDNR on chart  WBAT RLE as per ortho at home      Hypokalemia POA- improved, mild, 3.4 today  S/p IV KCL in ER  PO K+ x1 again today  -monitor      Recent Gram neg bacteremia   due to UTI   UA looks neg this admission  Bcx remains pending     Will cont' home keflex to compelte 10 days      Fall, mechanical vs syncope?  -unable to perform orthostatic vitals.  Head CT neg   -given pt's hospice status and daughter is in agreement with no aggressive measure. Agree will hold further work up to include MRI, EEG.  I have discussed with pt's daughter who agrees.  [place] [facility]  Acute on chronic anemia with leukopenia  Acute L leg DVT  -monitor H/H closely.  She may need more transfusion post op  -family opted for hospice on last discharge.  Also against systemic OAc or IVC filter  NO DVT prophylaxis post op as above      Severe dementia with hx of sundowning- IV ativan prn here  Anxiety and agitation, per daughter, difficult to control.    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 Pertinent Findings:  Gen:    Not in distress  Chest: Clear lungs  CVS:   Regular rhythm.   No edema  Abd:  Soft, not distended, not tender  Neuro:  Alert, oriented x 1  _______________________________________________________________________  DISCHARGE MEDICATIONS:   Current Discharge Medication List      CONTINUE these medications which have NOT CHANGED    Details   ALPRAZolam (XANAX) 0.25 mg tablet Take 0.25 mg by mouth two (2) times a day. metoprolol succinate (TOPROL-XL) 50 mg XL tablet Take 50 mg by mouth nightly. diphenhydrAMINE-acetaminophen (TYLENOL PM EXTRA STRENGTH)  mg tab Take 1 Tab by mouth nightly. acetaminophen (TYLENOL) 500 mg tablet Take 1,000 mg by mouth every six (6) hours as needed for Pain. cephALEXin (KEFLEX) 500 mg capsule Take 1 Cap by mouth four (4) times daily for 10 days. Qty: 40 Cap, Refills: 0      sertraline (ZOLOFT) 50 mg tablet Take 75 mg by mouth daily. My Recommended Diet, Activity, Wound Care, and follow-up labs are listed in the patient's Discharge Insturctions which I have personally completed and reviewed.     ______________________________________________________________________    Risk of deterioration: High    Condition at Discharge:  Stable  ______________________________________________________________________    Disposition  Home with hospice services  ______________________________________________________________________    Care Plan discussed with:   Patient, Family, RN, Care Manager, Consultant    ______________________________________________________________________    Code Status: DNR/DNI  ______________________________________________________________________      Follow up with:   PCP : Aric Ferrer NP  Follow-up Information     Follow up With Details Comments [address] Enedina Moura NP   [address] [place] [address], [address] [place] [address] Avenue  [phone]                Total time in minutes spent coordinating this discharge (includes going over instructions, follow-up, prescriptions, and preparing report for sign off to her PCP) :  26 minutes    Signed:  Lois Suarez MD

## 2018-04-02 NOTE — PROGRESS NOTES
Hospitalist Progress Note    NAME: Ricky Mathias   :  1934   MRN:  [id number]       Assessment / Plan:  R hip fracture POA- s/p ORIF  from [facility] at home in bathroom  Possible rotator cuff tear POA  -XR showed R femoral neck fracture    DVT and pain control per ortho team.    Given her dementia and severe owning hx. Would limit narcotic use  Preop clearance - refer to H&P note  S/p ORIF by ortho team  Pt not rehab-able, plan to resume Home hospice on discharge in AM  DDNR on chart  WBAT RLE as per ortho at home     Hypokalemia POA- improved, mild, 3.4   S/p IV KCL in ER  PO K+ x1 yesterday  -monitor     Recent Gram neg bacteremia   due to UTI   UA looks neg this admission  Bcx remains pending    Will cont' home keflex to compelte 10 days     Fall, mechanical vs syncope?  -unable to perform orthostatic vitals. Head CT neg   -given pt's hospice status and daughter is in agreement with no aggressive measure. Agree will hold further work up to include MRI, EEG. I have discussed with pt's daughter who agrees.     Acute on chronic anemia with leukopenia  Acute L leg DVT  -monitor H/H closely. She may need more transfusion post op  -family opted for hospice on last discharge. Also against systemic OAc or IVC filter  NO DVT prophylaxis post op as above     Severe dementia with hx of ing- IV ativan prn here  Anxiety and agitation, per daughter, difficult to control. 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 Recommended Disposition: Home with hospice in AM - am told [facility] cannot set up at home till tomorrow AM, Discharged delayed by 1 day . Subjective:     Chief Complaint / Reason for Physician Visit: F/U R hip fracture, R shoulder pain, Dementia, UTI  \"I am fine\".   Discussed with RN events overnight. Review of Systems:  Symptom Y/N Comments  Symptom Y/N Comments   Fever/Chills    Chest Pain     Poor Appetite    Edema     Cough    Abdominal Pain     Sputum    Joint Pain     SOB/HENDERSON    Pruritis/Rash     Nausea/vomit    Tolerating PT/OT     Diarrhea    Tolerating Diet     Constipation    Other       Could NOT obtain due to: Dementia     Objective:     VITALS:   Last 24hrs VS reviewed since prior progress note. Most recent are:  Patient Vitals for the past 24 hrs:   Temp Pulse Resp BP SpO2   04/02/18 1256 97.6 °F (36.4 °C) (!) 108 18 114/64 96 %   04/02/18 0742 97.8 °F (36.6 °C) 61 16 119/50 99 %   04/02/18 0413 99.3 °F (37.4 °C) 100 18 123/61 93 %   04/02/18 0000 98.5 °F (36.9 °C) 60 16 126/60 99 %   04/01/18 2015 97.6 °F (36.4 °C) 68 14 123/53 97 %   04/01/18 1527 98.2 °F (36.8 °C) 63 18 135/80 93 %       Intake/Output Summary (Last 24 hours) at 04/02/18 1349  Last data filed at 04/02/18 0640   Gross per 24 hour   Intake          4728.33 ml   Output              525 ml   Net          4203.33 ml        PHYSICAL EXAM:  General: WD, WN. Alert, somewhat cooperative, no acute distress    EENT:  EOMI. Anicteric sclerae. MMM  Resp:  CTA bilaterally, no wheezing or rales. No accessory muscle use  CV:  Regular  rhythm,  No edema  GI:  Soft, Non distended, Non tender.  +Bowel sounds  Neurologic:  Alert and oriented X 1, normal speech,   Psych:   Poor insight. Not anxious nor agitated, dementia noted +  Skin:  No rashes.   No jaundice    Reviewed most current lab test results and cultures  YES  Reviewed most current radiology test results   YES  Review and summation of old records today    NO  Reviewed patient's current orders and MAR    YES  PMH/SH reviewed - no change compared to H&P  ________________________________________________________________________  Care Plan discussed with:    Comments   Patient x    Family  x Family at bedside   RN x    Care Manager x Srini Mcnamara   Consultant  x Ortho PA Ramesh Hays Multidiciplinary team rounds were held today with , nursing, pharmacist and clinical coordinator. Patient's plan of care was discussed; medications were reviewed and discharge planning was addressed. ________________________________________________________________________  Total NON critical care TIME:  16   Minutes    Total CRITICAL CARE TIME Spent:   Minutes non procedure based      Comments   >50% of visit spent in counseling and coordination of care     ________________________________________________________________________  Nivia Blank MD     Procedures: see electronic medical records for all procedures/Xrays and details which were not copied into this note but were reviewed prior to creation of Plan. LABS:  I reviewed today's most current labs and imaging studies.   Pertinent labs include:  Recent Labs      04/01/18 0320 03/31/18   0525   WBC   --   1.9*   HGB  7.1*  8.5*   HCT   --   25.4*   PLT   --   272     Recent Labs      04/01/18   0320  03/31/18   0525   NA  142  140   K  3.4*  3.3*   CL  108  108   CO2  25  25   GLU  88  91   BUN  11  11   CREA  0.75  0.62   CA  7.5*  7.7*       Signed: Nivia Blank MD

## 2018-04-02 NOTE — ROUTINE PROCESS
PCP ЕЛЕНА appt scheduled with Dr. Lalitha Bahena on 4/6/18 at 2:30PM. Appt added to [place] Antoine [facility] Specialist

## 2018-04-02 NOTE — PROGRESS NOTES
ORTHO - Progress Note  Post Op day: 2 Days Post-Op    Dedrick Guevara     [id number]  female    80 y.o.    1934    Admit date:  3/30/2018  Date of Surgery:  3/31/2018   Procedures:  Procedure(s):RIGHT HIP HEMIARTHROPLASTY  Admitting Physician:  Kell Newell MD   Surgeon:  Tolu Maria) and Role: Bhavin Toney MD - Primary    Consulting Physician(s): Treatment Team: Attending Provider: Noel Montalvo MD; Consulting Provider: Edgar Henry NP; Consulting Provider: Tracy Green MD; Care Manager: Vick Keane; Utilization Review: Shayan Vaughn RN; Care Manager: Ron Concepcion    SUBJECTIVE:     Dedrick Guevara is a 80 y.o. female s/p a RIGHT HIP HEMIARTHROPLASTY resting in the bed. Daughter at bedside    OBJECTIVE:       Physical Exam:  General: alert, no distress, fidgety . Advanced dementia   Gastrointestinal:  Soft, non-distended. Cardiovascular: equal pulses in the lower extremities,  Brisk cap refill in all distal extremities   Genitourinary: Villanueva  Respiratory: No respiratory distress   Neurological: Active motion of the LLE  Dressing/Wound:  Clean, dry and intact. No significant erythema or swelling. Vital Signs:       Patient Vitals for the past 8 hrs:   BP Temp Pulse Resp SpO2 Weight   18 0742 119/50 97.8 °F (36.6 °C) 61 16 99 % -   18 0413 123/61 99.3 °F (37.4 °C) 100 18 93 % -                                          Temp (24hrs), Av °F (36.7 °C), Min:96.8 °F (36 °C), Max:99.3 °F (37.4 °C)       Labs:        Recent Labs      18   0320  18   0525   18   1147   HCT   --   25.4*   --    --    HGB  7.1*  8.5*   < >   --    INR   --    --    --   1.2*    < > = values in this interval not displayed. PT/OT:              ASSESSMENT / PLAN:   Active Problems:    Hip fracture ([address] (3/30/2018)    DC to home w/ hospice today  Family deferred anticoagulation- acute on chronic anemia.  LLE DVT dx on 3/26  WBAT- hip precautions reviewed with family    Signed By: Soha Padgett Julia Betts PA-C

## 2018-04-03 NOTE — PROGRESS NOTES
[address] [facility] [address]. Naveed Willard to 2805, patient is scheduled to be discharged at 0900 with home hospice. 26 Dr. Naveed Willard states she is coming to see the patient now to discharge the patient. 200 Dr. Naveed Willard states to keep the [place]. 1009 Discharge via [facility]. Changed statlock, removed IV discharge instructions with [facility]. No prescriptions. [facility] (Female) states they cannot the use inpatient copy of her DNR form and will treat her as a full code if she codes on the way home.       1010 Informed Haleigh Berumen (daughter)  that the patient was discharged from hospital.

## 2018-04-03 NOTE — PROGRESS NOTES
Bedside and Verbal shift change report given to [address] [place] [address] (oncoming nurse) by Brina Martinez (offgoing nurse). Report included the following information SBAR, Kardex, Procedure Summary, Intake/Output, MAR, Accordion, Recent Results and Med Rec Status.

## 2018-04-03 NOTE — PROGRESS NOTES
Hospitalist Progress Note    NAME: Lauryn Hughes   :  1934   MRN:  [id number]       Assessment / Plan:  R hip fracture POA  from [facility]F at home in bathroom  - XRay showed R femoral neck fracture  - 3/31/2018 Right hip hemiarhroplasty  - Family deferred DVT therapy. Continue pain control.   - Dc to home with hospice. WBAT RLE    Possible rotator cuff tear POA  - continue supportive therapy with pain meds      Hypokalemia POA-  - corrected     H/o E coli bacteremia and UTI on 3/26/18, pansensitive  UA and blood cultures negative on 3/30  Continue keflex to complete 10 days     Fall, mechanical  - Head CT is unremarkable     Acute on chronic anemia with leukopenia    Acute L leg DVT  - Family declines any oral anticoagulation given patient's medical condition and hospice enrollment.      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 DNR.     Code Status: DNR on chart/EMR  Surrogate Decision Maker: daughter  GI Prophylaxis: not indicated      Body mass index is 21.31 kg/(m^2). Recommended Disposition: home with hospice     Subjective:     Chief Complaint / Reason for Physician Visit: F/U R hip fracture, R shoulder pain, Dementia, UTI  \"Patient with dementia, not answering questions. \".  Discussed with RN events overnight. Review of Systems:  Symptom Y/N Comments  Symptom Y/N Comments   Fever/Chills    Chest Pain     Poor Appetite    Edema     Cough    Abdominal Pain     Sputum    Joint Pain     SOB/HENDERSON    Pruritis/Rash     Nausea/vomit    Tolerating PT/OT     Diarrhea    Tolerating Diet     Constipation    Other       Could NOT obtain due to: Dementia     Objective:     VITALS:   Last 24hrs VS reviewed since prior progress note.  Most recent are:  Patient Vitals for the past 24 hrs:   Temp Pulse Resp BP SpO2   18 0000 97.6 °F (36.4 °C) (!) 106 18 144/70 95 %   04/02/18 2000 97.7 °F (36.5 °C) (!) 103 20 141/73 95 %   04/02/18 1520 97.8 °F (36.6 °C) 100 16 131/78 98 %   04/02/18 1256 97.6 °F (36.4 °C) (!) 108 18 114/64 96 %       Intake/Output Summary (Last 24 hours) at 04/03/18 0759  Last data filed at 04/03/18 0200   Gross per 24 hour   Intake                0 ml   Output             1000 ml   Net            -1000 ml        PHYSICAL EXAM:  General: Sleeping. EENT:  EOMI. Anicteric sclerae. Resp:  CTA bilaterally, no wheezing or rales. CV:  Regular rate, 2+ left LE   GI:  Soft, Non distended, Non tender.  +Bowel sounds  Neurologic:      Opens her eyes but does not follows commands. Psych:   Poor insight.   Skin:  No rashes. No jaundice    Reviewed most current lab test results and cultures  YES  Reviewed most current radiology test results   YES  Review and summation of old records today    NO  Reviewed patient's current orders and MAR    YES  PMH/SH reviewed - no change compared to H&P  __________________________________________________________  ________________________________________________________________________  Lui Hair MD     Procedures: see electronic medical records for all procedures/Xrays and details which were not copied into this note but were reviewed prior to creation of Plan. LABS:  I reviewed today's most current labs and imaging studies.   Pertinent labs include:  Recent Labs      04/01/18   0320   HGB  7.1*     Recent Labs      04/01/18   0320   NA  142   K  3.4*   CL  108   CO2  25   GLU  88   BUN  11   CREA  0.75   CA  7.5*       Signed: Lui Hair MD

## 2018-04-04 NOTE — PROGRESS NOTES
Spoke with Joe Wisdom with [facility] today. She verified that Ms. Geri Bradley was admitted to hospice services yesterday. She is being followed by Dr. Noy Polanco attending now for services. Will close episode.

## 2018-04-04 NOTE — CERTIFICATE OF TERMINAL ILLNESS
Hospice Physician Admission Narrative     Principle Hospice Diagnosis: Alzheimer's disease       HOSPICE NARRATIVE COMPOSED BY PHYSICIAN   Do not cut and paste chart information other than imaging findings    Yvette Warren is a 80y.o. year old who was admitted to [facility]. The patient's principle diagnosis has resulted in further rapid progression of disease and a steady decline in overall functioning. Patient is very frail and debilitated with features of advanced protein calorie malnutrition. Patient has had several ER/hospital admissions in the past year with most recent one being 6 weeks ago where she was admitted with sepsis status post fall and leading to rotator cuff injury and a fracture of right hip. Patient underwent surgical repair of the hip but has been declining since then, has become minimally responsive, patient had another admission recently for DVT in the left leg. Patient currently with symptoms of generalized pain especially in the area of the hip, continuous moaning and grimacing due to pain, anxiety features of restlessness. Patient's appetite is poor and she has not been eating and drinking the past few days at all. Refer to LCD     Functionally, the patient's Karnofsky and/or Palliative Performance Scale has declined over a period of weeks and is estimated at 20%. The patient is dependent on the following ADLs: All ADLs    Objective information that support this patients limited prognosis includes: FAST score of 7D. Patient also has had 15 pound weight loss in the last 6 months.     The patient/family chose comfort measures with the support of Hospice.   ______________________________________________________________________

## 2018-04-06 NOTE — DISCHARGE SUMMARY
Ortho Discharge Summary    Patient ID:  Jennifer Snyder  [id number]  female  80 y.o.  1934    Admit date: 3/30/2018    Discharge date: 4/6/2018    Date of Surgery:   3/31/2018     Preoperative Diagnosis:  right hip fracture    Postoperative Diagnosis:   right hip fracture    Procedure(s):  RIGHT HIP HEMIARTHROPLASTY     Anesthesia Type: Other     Surgeon: Yang Jean Baptiste MD                            HPI:  Pt is a 80 y.o. female who has a history of right hip fracture with pain and limitations of activities of daily living who presents at this time for a RIGHT HIP HEMIARTHROPLASTY following the failure of conservative management. PMH:   Past Medical History:   Diagnosis Date    Anemia     Anxiety     DDD (degenerative disc disease), cervical     Depression     Esophagitis     FH: breast cancer     FH: hypertension     Hypercholesterolemia     Hypertension     MR (mitral regurgitation)     MVP (mitral valve prolapse)     Stool color black     Thromboembolus ([address] 03/20/2018       Medications upon admission :   Prior to Admission Medications   Prescriptions Last Dose Informant Patient Reported? Taking? ALPRAZolam (XANAX) 0.25 mg tablet 3/30/2018 at 0430 Child Yes Yes   Sig: Take 0.25 mg by mouth two (2) times a day. acetaminophen (TYLENOL) 500 mg tablet 3/30/2018 at 0500 Child Yes Yes   Sig: Take 1,000 mg by mouth every six (6) hours as needed for Pain. cephALEXin (KEFLEX) 500 mg capsule 3/30/2018 at 0430 Child No Yes   Sig: Take 1 Cap by mouth four (4) times daily for 10 days. diphenhydrAMINE-acetaminophen (TYLENOL PM EXTRA STRENGTH)  mg tab 3/29/2018 at Unknown time Child Yes Yes   Sig: Take 1 Tab by mouth nightly. metoprolol succinate (TOPROL-XL) 50 mg XL tablet 3/29/2018 at Unknown time Child Yes Yes   Sig: Take 50 mg by mouth nightly. sertraline (ZOLOFT) 50 mg tablet 3/29/2018 at Unknown time Child Yes Yes   Sig: Take 75 mg by mouth daily.       Facility-Administered Medications: None        Allergies: Allergies   Allergen Reactions    Bactrim [Sulfamethoxazole-Trimethoprim] Rash        Hospital Course: The patient underwent surgery. Complications:  None; patient tolerated the procedure well. Was taken to the PACU in stable condition and then transferred to the ortho floor. Perioperative Antibiotics:  Ancef     Postoperative Pain Management:   Oxycodone po oral suspension     DVT Prophylaxis: SCD,     Postoperative transfusions:    Number of units banked PRBCs =   none     Post Op complications: none    Hemoglobin at discharge:    Lab Results   Component Value Date/Time    HGB 7.1 (L) 2018 03:20 AM    INR 1.2 (H) 2018 11:47 AM       Dressing was changed on POD # 1. Incision - clean, dry and intact. No significant erythema or swelling. Neurovascular exam found to be within normal limits. Wound appears to be healing without any evidence of infection. Physical Therapy started on the day following surgery and participated in bed mobility, transfers and ambulation. Home exercises explained and demonstrated. Will begin formal PT after post op office visit. Gait:                      Discharged to: SNF    Condition on Discharge:   good    Discharge instructions:  - Take pain medications as prescribed  - Resume pre hospital diet      - Discharge activity: activity as tolerated, perform home exercises as instructed  - Wound Care Keep wound clean and dry.   See discharge instruction sheet.  - Staples or Sutures to be removed around 7-10 days after surgery at post op office visit            -Wallace Fernandez 1560 LIST     Discharge Medication List as of 4/3/2018  8:43 AM      CONTINUE these medications which have NOT CHANGED    Details   ALPRAZolam (XANAX) 0.25 mg tablet Take 0.25 mg by mouth two (2) times a day., Historical Med      metoprolol succinate (TOPROL-XL) 50 mg XL tablet Take 50 mg by mouth nightly., Historical Med      diphenhydrAMINE-acetaminophen (TYLENOL PM EXTRA STRENGTH)  mg tab Take 1 Tab by mouth nightly., Historical Med      acetaminophen (TYLENOL) 500 mg tablet Take 1,000 mg by mouth every six (6) hours as needed for Pain., Historical Med      cephALEXin (KEFLEX) 500 mg capsule Take 1 Cap by mouth four (4) times daily for 10 days. , Print, Disp-40 Cap, R-0      sertraline (ZOLOFT) 50 mg tablet Take 75 mg by mouth daily. , Historical Med          per medical continuation form      -Follow up in office as scheduled, appointment card given. Signed:   Jean Carlos [place]    4/6/2018  8:58 AM

## 2019-01-24 NOTE — PROGRESS NOTES
Hospitalist Progress Note    NAME: Anil Anderson   :  1934   MRN:  [id number]       Assessment / Plan:  Severe sepsis POA - resolved  Hypotension POA- resolved  due to [facility] urinary tract infection POA  E Coli bacteremia POA  Urine Cx & initial Blood Cx shows EColi- pan sensitive  Repeat Blood Cx (2/15) remains neg x 17 hrs    Off IVF    Cont IV ceftriaxone for now  Follow repeat Blood Cx for clearance- if remains neg till tomorrow, will DC home on PO Abx (Pt's daughter not interested in IV ABx at home- will be conservative at her age- will honor the wishes)    Hypertension- now not hypotensive  Paroxsymal Sinus tachycardia - likely reflex from holding metoprolol  Keep holding losartan, hydrochlorothiazide    Resumed metoprolol yesterday -increase the dose to 50 mg daily (takes 25 mg at home)    Acute metabolic encephalopathy POA- close to baseline now  likely related to UTI, delirium with underlying dementia  Neuro exam is non focal   CT brain shows ventriculomegaly and no acute process.  She will need outpatient follow-up for this. Cont Xanax as at home  Resumed zoloft & neurontin  Sitter prn, ? Haldol prn if too agitated at nights/    Mild rhabdomyolysis POA- Ck improved  Off IV fluids now    Anemia- Hb 7.2  Repeat CBC stable Hb >7.0  No obvious source of bleeding  Check Stool for occult blood when able      Possible CKD stage III- Cr improved to 1.0  Follow BMP                Code Status: DNR, will DC telemetry today  Surrogate Decision Maker: Jrodan Carreno     DVT Prophylaxis: heparin  GI Prophylaxis: not indicated     Baseline: lives at home with DTR and semi independent at home    Recommended Disposition: Home with family as per daughter's wishes- pt not rehabable, Daughter is RN     Subjective:     Chief Complaint / Reason for Physician Visit: F/u AMS, UTI, gram neg bacteremia  \"i am fine\". Discussed with RN events overnight.      Review of Systems:  Symptom Y/N Comments  Symptom Y/N Comments Unable to reach patient at this time. Left voicemail requesting callback at [phone].      Fever/Chills    Chest Pain     Poor Appetite    Edema     Cough    Abdominal Pain     Sputum    Joint Pain     SOB/HENDERSON    Pruritis/Rash     Nausea/vomit    Tolerating PT/OT     Diarrhea    Tolerating Diet     Constipation    Other       Could NOT obtain due to: dementia     Objective:     VITALS:   Last 24hrs VS reviewed since prior progress note. Most recent are:  Patient Vitals for the past 24 hrs:   Temp Pulse Resp BP SpO2   02/16/18 1200 98.2 °F (36.8 °C) (!) 110 18 140/73 96 %   02/16/18 0929 98.6 °F (37 °C) 98 18 141/65 94 %   02/16/18 0318 98.3 °F (36.8 °C) (!) 104 18 133/79 95 %   02/15/18 2305 97.7 °F (36.5 °C) (!) 106 18 137/75 95 %   02/15/18 2013 98.3 °F (36.8 °C) (!) 114 20 132/62 96 %   02/15/18 1643 98.3 °F (36.8 °C) (!) 111 16 148/81 96 %     No intake or output data in the 24 hours ending 02/16/18 1306     PHYSICAL EXAM:  General: WD, WN. Alert, cooperative, no acute distress    EENT:  EOMI. Anicteric sclerae. MMM  Resp:  CTA bilaterally, no wheezing or rales. No accessory muscle use  CV:  Regular  rhythm,  No edema  GI:  Soft, Non distended, Non tender.  +Bowel sounds  Neurologic:  Alert and oriented X 1/2, normal speech,   Psych:   Poor insight. little anxious but not agitated, dementia noted +  Skin:  No rashes. No jaundice    Reviewed most current lab test results and cultures  YES  Reviewed most current radiology test results   YES  Review and summation of old records today    NO  Reviewed patient's current orders and MAR    YES  PMH/SH reviewed - no change compared to H&P  ________________________________________________________________________  Care Plan discussed with:    Comments   Patient x    Family      RN x    Care Manager     Consultant                        Multidiciplinary team rounds were held today with , nursing, pharmacist and clinical coordinator. Patient's plan of care was discussed; medications were reviewed and discharge planning was addressed. ________________________________________________________________________  Total NON critical care TIME:  16   Minutes    Total CRITICAL CARE TIME Spent:   Minutes non procedure based      Comments   >50% of visit spent in counseling and coordination of care     ________________________________________________________________________  Gt Jacques MD     Procedures: see electronic medical records for all procedures/Xrays and details which were not copied into this note but were reviewed prior to creation of Plan. LABS:  I reviewed today's most current labs and imaging studies.   Pertinent labs include:  Recent Labs      02/15/18   0153  02/14/18   0056   WBC  2.3*  2.3*   HGB  7.2*  6.8*   HCT  21.7*  20.9*   PLT  208  238     Recent Labs      02/15/18   0153  02/14/18   0056   NA  138  141   K  3.5  4.2   CL  106  110*   CO2  23  25   GLU  136*  126*   BUN  24*  26*   CREA  1.07*  1.20*   CA  8.1*  7.8*       Signed: Gt Jacques MD

## 2020-01-09 NOTE — ED NOTES
1600- Assumed care of patient. Patient is alert and oriented x 0 (baseline per family). Patient appears anxious. Patient ambulatory to Ed from PCP office with c/o low hemoglobin. Patient febrile on arrival. Monitor x 3 applied with ST. Patient positioned for comfort with call bell within reach. Side rails up for safety. Provider to evaluate patient. BRIEF OPERATIVE NOTE    Date of Procedure: 1/9/2020   Preoperative Diagnosis: OSTEOMYELITIS  Postoperative Diagnosis: OSTEOMYELITIS    Procedure(s):  AMPUTATION 2ND TOE RIGHT FOOT  Surgeon(s) and Role:  Nelia Valdez DPM - Primary         Surgical Assistant: None    Surgical Staff:  Circ-1: Radha Esparza  Scrub Tech-1: Jayme Kowalski  Event Time In Time Out   Incision Start 01/09/2020 1314    Incision Close 01/09/2020 1342      Anesthesia: General   Estimated Blood Loss: 1 mL  Specimens:   ID Type Source Tests Collected by Time Destination   1 : Second digit right foot with viable proximal bone Preservative Toe  Nelia Valdez DPM 1/9/2020 1327 Pathology   1 : Necrotic bone from second digit right foot Bone Toe AEROBIC BACTERIAL CULTURE Nelia Valdez DPM 1/9/2020 1325 Microbiology   2 : Culture second digit right foot Wound Toe CULTURE, ANAEROBIC Nelia Valdez DPM 1/9/2020 1327 Microbiology      Findings: Necrotic bone proximal phalanx second digit, right foot  Complications: None  Implants: None

## 2022-05-26 NOTE — MR AVS SNAPSHOT
Visit Information Date & Time Provider Department Dept. Phone Encounter #  
 11/1/2017 12:00 PM Katelynn JefersonDaniel [phone] [id number] Follow-up Instructions Return in about 6 months (around 5/1/2018). Upcoming Health Maintenance Date Due  
 GLAUCOMA SCREENING Q2Y 5/13/2016 Pneumococcal 65+ High/Highest Risk (2 of 2 - PPSV23) 9/15/2016 MEDICARE YEARLY EXAM 8/1/2018 DTaP/Tdap/Td series (2 - Td) 7/31/2027 Allergies as of 11/1/2017  Review Complete On: 9/15/2017 By: Katelynn Govea NP No Known Allergies Current Immunizations  Reviewed on 10/18/2015 Name Date Influenza High Dose Vaccine PF 9/15/2017  9:57 AM, 1/28/2015 Influenza Vaccine 10/3/2013 Influenza Vaccine (Quad) PF 10/18/2015  8:45 AM  
 Influenza Vaccine Split 8/31/2012, 9/15/2011 Tdap 7/31/2017 12:11 PM  
 ZZZ-RETIRED (DO NOT USE) Pneumococcal Vaccine (Unspecified Type) 9/15/2011 Not reviewed this visit You Were Diagnosed With   
  
 Codes Comments Burning sensation in lower extremity     ICD-10-CM: R20.8 ICD-9-CM: 782.0 Depression, unspecified depression type     ICD-10-CM: F32.9 ICD-9-CM: 796 Vitals BP Pulse Temp Resp Height(growth percentile) Weight(growth percentile) 151/53 62 98.2 °F (36.8 °C) (Oral) 20 5' 5\" (1.651 m) 109 lb (49.4 kg) LMP SpO2 BMI OB Status Smoking Status (LMP Unknown) 95% 18.14 kg/m2 Hysterectomy Former Smoker Vitals History BMI and BSA Data Body Mass Index Body Surface Area  
 18.14 kg/m 2 1.51 m 2 Preferred Pharmacy Pharmacy Name Phone [facility] #Adrianne Ward [phone] Your Updated Medication List  
  
   
This list is accurate as of: 11/1/17  1:23 PM.  Always use your most recent med list.  
  
  
  
  
 ALPRAZolam 0.25 mg tablet Commonly known as:  Lynne Whiteriver TAKE ONE TABLET BY MOUTH TWICE DAILY AS NEEDED FOR ANXIETY. MAX OF 2 TABLETS DAILY  Indications: anxiety  
  
 aspirin 81 mg chewable tablet Take 81 mg by mouth daily. gabapentin 300 mg capsule Commonly known as:  NEURONTIN Take 1 Cap by mouth two (2) times a day. Can increase to BID after 1 week  
  
 losartan-hydroCHLOROthiazide 100-25 mg per tablet Commonly known as:  HYZAAR  
TAKE ONE TABLET BY MOUTH ONE TIME DAILY  
  
 metoprolol succinate 25 mg XL tablet Commonly known as:  TOPROL-XL Take 1 Tab by mouth daily. sertraline 50 mg tablet Commonly known as:  ZOLOFT  
1 po bid TYLENOL PM PO Take  by mouth. Prescriptions Printed Refills ALPRAZolam (XANAX) 0.25 mg tablet 3 Sig: TAKE ONE TABLET BY MOUTH TWICE DAILY AS NEEDED FOR ANXIETY. MAX OF 2 TABLETS DAILY  Indications: anxiety Class: Print Prescriptions Sent to Pharmacy Refills  
 gabapentin (NEURONTIN) 300 mg capsule 3 Sig: Take 1 Cap by mouth two (2) times a day. Can increase to BID after 1 week Class: Normal  
 Pharmacy: [id number]: [phone] Route: Oral  
  
We Performed the Following CBC WITH AUTOMATED DIFF [98394 CPT(R)] IRON PROFILE [id number] CPT(R)] METABOLIC PANEL, COMPREHENSIVE [84756 CPT(R)] TSH RFX ON ABNORMAL TO FREE T4 [[id number] Custom] VITAMIN B12 & FOLATE [38296 CPT(R)] Follow-up Instructions Return in about 6 months (around 5/1/2018). Introducing [facility]! Romayne Duster introduces [employer] patient portal. Now you can access parts of your medical record, email your doctor's office, and request medication refills online. 1. In your internet browser, go to [email or web address]://"[employer] 2. Click on the First Time User? Click Here link in the Sign In box. You will see the New Member Sign Up page. 3. Enter your [employer] Access Code exactly as it appears below. You will not need to use this code after youve completed the sign-up process. If you do not sign up before the expiration date, you must request a new code. · [employer] Access Code: [email or web address] Expires: 11/30/2017  1:22 PM 
 
4. Enter the last four digits of your Social Security Number (xxxx) and Date of Birth (mm/dd/yyyy) as indicated and click Submit. You will be taken to the next sign-up page. 5. Create a [employer] ID. This will be your [employer] login ID and cannot be changed, so think of one that is secure and easy to remember. 6. Create a [employer] password. You can change your password at any time. 7. Enter your Password Reset Question and Answer. This can be used at a later time if you forget your password. 8. Enter your e-mail address. You will receive e-mail notification when new information is available in [address] 9. Click Sign Up. You can now view and download portions of your medical record. 10. Click the Download Summary menu link to download a portable copy of your medical information. If you have questions, please visit the Frequently Asked Questions section of the [employer] website. Remember, [employer] is NOT to be used for urgent needs. For medical emergencies, dial 911. Now available from your iPhone and Android! Please provide this summary of care documentation to your next provider. Your primary care clinician is listed as Kaushik Betancourt. If you have any questions after today's visit, please call [phone]. yes...

## 2022-11-28 NOTE — PROGRESS NOTES
Anesthesia Pre Eval Note    Anesthesia ROS/Med Hx        Anesthetic Complication History:  Patient does not have a history of anesthetic complications      Pulmonary Review:    Positive for asthma    Neuro/Psych Review:    Positive for psychiatric history    Cardiovascular Review:    Positive for hypertension  Positive for hyperlipidemia    GI/HEPATIC/RENAL Review:    Positive for GERD    End/Other Review:  Positive for diabetes  Positive for arthritis      Relevant Problems   No relevant active problems       Physical Exam     Airway   Mallampati: II  TM Distance: >3 FB  Neck ROM: Full  Neck: Non-tender and Able to place in sniff position  TMJ Mobility: Good    Cardiovascular  Cardiovascular exam normal  Cardio Rhythm: Regular  Cardio Rate: Normal    Head Assessment  Head assessment: Normocephalic and Atraumatic    General Assessment  General Assessment: Alert and oriented and No acute distress    Dental Exam  Dental exam normal    Pulmonary Exam  Pulmonary exam normal  Breath sounds clear to auscultation:  Yes    Abdominal Exam  Abdominal exam normal      Anesthesia Plan:    ASA Status: 3  Anesthesia Type: General    Induction: Intravenous  Preferred Airway Type: LMA  Maintenance: Inhalational    Post-op Pain Management: Per Surgeon      Checklist  Reviewed: NPO Status, Allergies, Medications, Problem list and Past Med History  Consent/Risks Discussed Statement:  The proposed anesthetic plan, including its risks and benefits, have been discussed with the Patient along with the risks and benefits of alternatives. Questions were encouraged and answered and the patient and/or representative understands and agrees to proceed. I discussed with the patient (and/or patient's legal representative) the risks and benefits of the proposed anesthesia plan, General, which may include services performed by other anesthesia providers.     Alternative anesthesia plans, if available, were reviewed with the patient (and/or CM received call from pt's NN, Shannan, re: discharge planning. Upon her previous admission from 3/26-3/28 pt was discharged home with [facility] to follow. Per Shannan, admission to hospice was deferred to today, 3/30, due to pt's daughter not being available yesterday, 3/29. Pt was then brought to the ED and found to have a hip fracture. CM to continue to follow and clarify disposition plan, to include home hospice needs.      NATIVIDAD Arceo  [facility]  [phone] patient's legal representative). Discussion has been held with the patient (and/or patient's legal representative) regarding risks of anesthesia, which include vomiting, nausea and dental injury and emergent situations that may require change in anesthesia plan. The patient (and/or patient's legal representative) has indicated understanding, his/her questions have been answered, and he/she wishes to proceed with the planned anesthetic.     Blood Products: Not Anticipated

## 2024-06-18 NOTE — ROUTINE PROCESS
Attempted to call, left a message asking for patient to call the office. Office number provided in message. Final attempt made to reach patient. Will send letter to her home. Patient does not have my chart. Phone number listed for patient is the same number as patient's EC>    Bedside and Verbal shift change report given to Mary Buchanan Dr (oncoming nurse) by Sharron Saldana RN (offgoing nurse). Report included the following information SBAR, Kardex, Intake/Output, MAR and Recent Results.

## (undated) DEVICE — SOLUTION IRRIG 1000ML H2O STRL BLT

## (undated) DEVICE — SPONGE LAP 18X18IN STRL -- 5/PK

## (undated) DEVICE — SUTURE VCRL SZ 1 L36IN ABSRB UD L36MM CT-1 1/2 CIR J947H

## (undated) DEVICE — TOWEL SURG W17XL27IN STD BLU COT NONFENESTRATED PREWASHED

## (undated) DEVICE — STERILE LATEX POWDER-FREE SURGICAL GLOVESWITH NITRILE COATING: Brand: PROTEXIS

## (undated) DEVICE — ABDOMINAL PAD: Brand: DERMACEA

## (undated) DEVICE — REM POLYHESIVE ADULT PATIENT RETURN ELECTRODE: Brand: VALLEYLAB

## (undated) DEVICE — GLOVE SURG SZ 8.5 L11.85IN FNGR THK7.5MIL IVRY LTX FREE

## (undated) DEVICE — SOLUTION IV 1000ML 0.9% SOD CHL

## (undated) DEVICE — 2108 SERIES SAGITTAL BLADE (24.8 X 0.88 X 73.8MM)

## (undated) DEVICE — HANDPIECE SET WITH COAXIAL HIGH FLOW TIP AND SUCTION TUBE: Brand: INTERPULSE

## (undated) DEVICE — SUTURE VCRL SZ 2-0 L36IN ABSRB UD L36MM CT-1 1/2 CIR J945H

## (undated) DEVICE — (D)PREP SKN CHLRAPRP APPL 26ML -- CONVERT TO ITEM 371833

## (undated) DEVICE — INFECTION CONTROL KIT SYS

## (undated) DEVICE — GOWN,SIRUS,NONRNF,SETINSLV,2XL,18/CS: Brand: MEDLINE

## (undated) DEVICE — Device

## (undated) DEVICE — HANDLE LT SNAP ON ULT DURABLE LENS FOR TRUMPF ALC DISPOSABLE

## (undated) DEVICE — DEVON™ KNEE AND BODY STRAP 60" X 3" (1.5 M X 7.6 CM): Brand: DEVON

## (undated) DEVICE — 3M™ IOBAN™ 2 ANTIMICROBIAL INCISE DRAPE 6651EZ: Brand: IOBAN™ 2

## (undated) DEVICE — 3000CC GUARDIAN II: Brand: GUARDIAN

## (undated) DEVICE — SOLUTION IRRIG 3000ML 0.9% SOD CHL FLX CONT 0797208] ICU MEDICAL INC]